# Patient Record
Sex: MALE | Race: WHITE | NOT HISPANIC OR LATINO | Employment: OTHER | URBAN - METROPOLITAN AREA
[De-identification: names, ages, dates, MRNs, and addresses within clinical notes are randomized per-mention and may not be internally consistent; named-entity substitution may affect disease eponyms.]

---

## 2021-03-01 ENCOUNTER — APPOINTMENT (OUTPATIENT)
Dept: CT IMAGING | Facility: HOSPITAL | Age: 76
End: 2021-03-01

## 2021-03-01 ENCOUNTER — APPOINTMENT (OUTPATIENT)
Dept: GENERAL RADIOLOGY | Facility: HOSPITAL | Age: 76
End: 2021-03-01

## 2021-03-01 ENCOUNTER — HOSPITAL ENCOUNTER (INPATIENT)
Facility: HOSPITAL | Age: 76
LOS: 11 days | Discharge: HOME HEALTH | End: 2021-03-12
Attending: EMERGENCY MEDICINE | Admitting: FAMILY MEDICINE

## 2021-03-01 DIAGNOSIS — I50.43 ACUTE ON CHRONIC COMBINED SYSTOLIC AND DIASTOLIC CHF (CONGESTIVE HEART FAILURE) (HCC): ICD-10-CM

## 2021-03-01 DIAGNOSIS — E11.9 TYPE 2 DIABETES MELLITUS WITHOUT COMPLICATION, UNSPECIFIED WHETHER LONG TERM INSULIN USE (HCC): ICD-10-CM

## 2021-03-01 DIAGNOSIS — R18.8 OTHER ASCITES: ICD-10-CM

## 2021-03-01 DIAGNOSIS — R06.09 DYSPNEA ON EXERTION: ICD-10-CM

## 2021-03-01 DIAGNOSIS — C25.3: ICD-10-CM

## 2021-03-01 DIAGNOSIS — R93.5 ABNORMAL CT OF THE ABDOMEN: ICD-10-CM

## 2021-03-01 DIAGNOSIS — J90 PLEURAL EFFUSION: ICD-10-CM

## 2021-03-01 DIAGNOSIS — J90 PLEURAL EFFUSION: Primary | ICD-10-CM

## 2021-03-01 DIAGNOSIS — J90 PLEURAL EFFUSION ON LEFT: ICD-10-CM

## 2021-03-01 DIAGNOSIS — R77.8 ELEVATED TROPONIN: ICD-10-CM

## 2021-03-01 PROBLEM — I81 PORTAL VEIN THROMBOSIS: Status: ACTIVE | Noted: 2021-03-01

## 2021-03-01 PROBLEM — R79.89 ELEVATED TROPONIN: Status: ACTIVE | Noted: 2021-03-01

## 2021-03-01 LAB
ALBUMIN SERPL-MCNC: 3.9 G/DL (ref 3.5–5.2)
ALBUMIN/GLOB SERPL: 1.3 G/DL
ALP SERPL-CCNC: 184 U/L (ref 39–117)
ALT SERPL W P-5'-P-CCNC: 20 U/L (ref 1–41)
ANION GAP SERPL CALCULATED.3IONS-SCNC: 12 MMOL/L (ref 5–15)
APPEARANCE FLD: ABNORMAL
APTT PPP: 26.6 SECONDS (ref 50–95)
AST SERPL-CCNC: 25 U/L (ref 1–40)
BASOPHILS # BLD AUTO: 0.06 10*3/MM3 (ref 0–0.2)
BASOPHILS # BLD AUTO: 0.08 10*3/MM3 (ref 0–0.2)
BASOPHILS NFR BLD AUTO: 0.7 % (ref 0–1.5)
BASOPHILS NFR BLD AUTO: 0.8 % (ref 0–1.5)
BILIRUB SERPL-MCNC: 0.7 MG/DL (ref 0–1.2)
BUN SERPL-MCNC: 18 MG/DL (ref 8–23)
BUN/CREAT SERPL: 25 (ref 7–25)
CALCIUM SPEC-SCNC: 9.3 MG/DL (ref 8.6–10.5)
CHLORIDE SERPL-SCNC: 101 MMOL/L (ref 98–107)
CO2 SERPL-SCNC: 28 MMOL/L (ref 22–29)
COLOR FLD: YELLOW
CREAT SERPL-MCNC: 0.72 MG/DL (ref 0.76–1.27)
D-LACTATE SERPL-SCNC: 1.9 MMOL/L (ref 0.5–2)
DEPRECATED RDW RBC AUTO: 42.6 FL (ref 37–54)
DEPRECATED RDW RBC AUTO: 42.8 FL (ref 37–54)
EOSINOPHIL # BLD AUTO: 0.07 10*3/MM3 (ref 0–0.4)
EOSINOPHIL # BLD AUTO: 0.08 10*3/MM3 (ref 0–0.4)
EOSINOPHIL NFR BLD AUTO: 0.7 % (ref 0.3–6.2)
EOSINOPHIL NFR BLD AUTO: 1 % (ref 0.3–6.2)
ERYTHROCYTE [DISTWIDTH] IN BLOOD BY AUTOMATED COUNT: 13 % (ref 12.3–15.4)
ERYTHROCYTE [DISTWIDTH] IN BLOOD BY AUTOMATED COUNT: 13.2 % (ref 12.3–15.4)
FLUAV RNA RESP QL NAA+PROBE: NOT DETECTED
FLUBV RNA RESP QL NAA+PROBE: NOT DETECTED
GFR SERPL CREATININE-BSD FRML MDRD: 106 ML/MIN/1.73
GLOBULIN UR ELPH-MCNC: 3 GM/DL
GLUCOSE BLDC GLUCOMTR-MCNC: 269 MG/DL (ref 70–130)
GLUCOSE SERPL-MCNC: 172 MG/DL (ref 65–99)
HCT VFR BLD AUTO: 42.6 % (ref 37.5–51)
HCT VFR BLD AUTO: 47.6 % (ref 37.5–51)
HGB BLD-MCNC: 13.1 G/DL (ref 13–17.7)
HGB BLD-MCNC: 15 G/DL (ref 13–17.7)
HOLD SPECIMEN: NORMAL
IMM GRANULOCYTES # BLD AUTO: 0.03 10*3/MM3 (ref 0–0.05)
IMM GRANULOCYTES # BLD AUTO: 0.05 10*3/MM3 (ref 0–0.05)
IMM GRANULOCYTES NFR BLD AUTO: 0.4 % (ref 0–0.5)
IMM GRANULOCYTES NFR BLD AUTO: 0.5 % (ref 0–0.5)
INR PPP: 1.14 (ref 0.85–1.16)
INR PPP: 1.2 (ref 0.8–1.2)
LDH FLD-CCNC: 222 U/L
LDH SERPL-CCNC: 134 U/L (ref 135–225)
LIPASE SERPL-CCNC: 14 U/L (ref 13–60)
LYMPHOCYTES # BLD AUTO: 0.97 10*3/MM3 (ref 0.7–3.1)
LYMPHOCYTES # BLD AUTO: 1.17 10*3/MM3 (ref 0.7–3.1)
LYMPHOCYTES NFR BLD AUTO: 10.9 % (ref 19.6–45.3)
LYMPHOCYTES NFR BLD AUTO: 13.4 % (ref 19.6–45.3)
LYMPHOCYTES NFR FLD MANUAL: 67 %
MACROPHAGE FLUID: 17 %
MCH RBC QN AUTO: 27.9 PG (ref 26.6–33)
MCH RBC QN AUTO: 28 PG (ref 26.6–33)
MCHC RBC AUTO-ENTMCNC: 30.8 G/DL (ref 31.5–35.7)
MCHC RBC AUTO-ENTMCNC: 31.5 G/DL (ref 31.5–35.7)
MCV RBC AUTO: 88.8 FL (ref 79–97)
MCV RBC AUTO: 90.6 FL (ref 79–97)
MONOCYTES # BLD AUTO: 0.65 10*3/MM3 (ref 0.1–0.9)
MONOCYTES # BLD AUTO: 0.96 10*3/MM3 (ref 0.1–0.9)
MONOCYTES NFR BLD AUTO: 8.9 % (ref 5–12)
MONOCYTES NFR BLD AUTO: 9 % (ref 5–12)
MONOCYTES NFR FLD: 5 %
NEUTROPHILS NFR BLD AUTO: 5.46 10*3/MM3 (ref 1.7–7)
NEUTROPHILS NFR BLD AUTO: 75.4 % (ref 42.7–76)
NEUTROPHILS NFR BLD AUTO: 78.3 % (ref 42.7–76)
NEUTROPHILS NFR BLD AUTO: 8.41 10*3/MM3 (ref 1.7–7)
NEUTROPHILS NFR FLD MANUAL: 11 %
NRBC BLD AUTO-RTO: 0 /100 WBC (ref 0–0.2)
NRBC BLD AUTO-RTO: 0 /100 WBC (ref 0–0.2)
NT-PROBNP SERPL-MCNC: 809.8 PG/ML (ref 0–1800)
PH FLD: 7.36 [PH]
PLATELET # BLD AUTO: 173 10*3/MM3 (ref 140–450)
PLATELET # BLD AUTO: 240 10*3/MM3 (ref 140–450)
PMV BLD AUTO: 10.2 FL (ref 6–12)
PMV BLD AUTO: 10.3 FL (ref 6–12)
POTASSIUM SERPL-SCNC: 4.2 MMOL/L (ref 3.5–5.2)
PROCALCITONIN SERPL-MCNC: 0.04 NG/ML (ref 0–0.25)
PROT FLD-MCNC: 3.6 G/DL
PROT SERPL-MCNC: 6.9 G/DL (ref 6–8.5)
PROTHROMBIN TIME: 13.9 SECONDS (ref 12.8–15.2)
PROTHROMBIN TIME: 14.3 SECONDS (ref 11.5–14)
RBC # BLD AUTO: 4.7 10*6/MM3 (ref 4.14–5.8)
RBC # BLD AUTO: 5.36 10*6/MM3 (ref 4.14–5.8)
RBC # FLD AUTO: ABNORMAL /MM3
SARS-COV-2 RNA RESP QL NAA+PROBE: NOT DETECTED
SODIUM SERPL-SCNC: 141 MMOL/L (ref 136–145)
TROPONIN T SERPL-MCNC: 0.03 NG/ML (ref 0–0.03)
TROPONIN T SERPL-MCNC: 0.03 NG/ML (ref 0–0.03)
TROPONIN T SERPL-MCNC: 0.06 NG/ML (ref 0–0.03)
UFH PPP CHRO-ACNC: 0.1 IU/ML (ref 0.3–0.7)
WBC # BLD AUTO: 10.75 10*3/MM3 (ref 3.4–10.8)
WBC # BLD AUTO: 7.24 10*3/MM3 (ref 3.4–10.8)
WBC # FLD AUTO: 1057 /MM3
WHOLE BLOOD HOLD SPECIMEN: NORMAL
WHOLE BLOOD HOLD SPECIMEN: NORMAL

## 2021-03-01 PROCEDURE — 87206 SMEAR FLUORESCENT/ACID STAI: CPT | Performed by: INTERNAL MEDICINE

## 2021-03-01 PROCEDURE — 85610 PROTHROMBIN TIME: CPT

## 2021-03-01 PROCEDURE — 74177 CT ABD & PELVIS W/CONTRAST: CPT

## 2021-03-01 PROCEDURE — 84157 ASSAY OF PROTEIN OTHER: CPT | Performed by: INTERNAL MEDICINE

## 2021-03-01 PROCEDURE — 83615 LACTATE (LD) (LDH) ENZYME: CPT | Performed by: INTERNAL MEDICINE

## 2021-03-01 PROCEDURE — 87205 SMEAR GRAM STAIN: CPT | Performed by: INTERNAL MEDICINE

## 2021-03-01 PROCEDURE — 87116 MYCOBACTERIA CULTURE: CPT | Performed by: INTERNAL MEDICINE

## 2021-03-01 PROCEDURE — 87102 FUNGUS ISOLATION CULTURE: CPT | Performed by: INTERNAL MEDICINE

## 2021-03-01 PROCEDURE — 89051 BODY FLUID CELL COUNT: CPT | Performed by: INTERNAL MEDICINE

## 2021-03-01 PROCEDURE — 85025 COMPLETE CBC W/AUTO DIFF WBC: CPT

## 2021-03-01 PROCEDURE — 83986 ASSAY PH BODY FLUID NOS: CPT | Performed by: INTERNAL MEDICINE

## 2021-03-01 PROCEDURE — 93005 ELECTROCARDIOGRAM TRACING: CPT | Performed by: EMERGENCY MEDICINE

## 2021-03-01 PROCEDURE — 99285 EMERGENCY DEPT VISIT HI MDM: CPT

## 2021-03-01 PROCEDURE — 82962 GLUCOSE BLOOD TEST: CPT

## 2021-03-01 PROCEDURE — 83690 ASSAY OF LIPASE: CPT | Performed by: EMERGENCY MEDICINE

## 2021-03-01 PROCEDURE — 83605 ASSAY OF LACTIC ACID: CPT | Performed by: NURSE PRACTITIONER

## 2021-03-01 PROCEDURE — C1729 CATH, DRAINAGE: HCPCS

## 2021-03-01 PROCEDURE — 71045 X-RAY EXAM CHEST 1 VIEW: CPT

## 2021-03-01 PROCEDURE — 80053 COMPREHEN METABOLIC PANEL: CPT | Performed by: EMERGENCY MEDICINE

## 2021-03-01 PROCEDURE — 99223 1ST HOSP IP/OBS HIGH 75: CPT | Performed by: FAMILY MEDICINE

## 2021-03-01 PROCEDURE — 84484 ASSAY OF TROPONIN QUANT: CPT | Performed by: FAMILY MEDICINE

## 2021-03-01 PROCEDURE — 83880 ASSAY OF NATRIURETIC PEPTIDE: CPT | Performed by: EMERGENCY MEDICINE

## 2021-03-01 PROCEDURE — 87040 BLOOD CULTURE FOR BACTERIA: CPT | Performed by: NURSE PRACTITIONER

## 2021-03-01 PROCEDURE — 84484 ASSAY OF TROPONIN QUANT: CPT | Performed by: EMERGENCY MEDICINE

## 2021-03-01 PROCEDURE — 85730 THROMBOPLASTIN TIME PARTIAL: CPT

## 2021-03-01 PROCEDURE — 84145 PROCALCITONIN (PCT): CPT | Performed by: NURSE PRACTITIONER

## 2021-03-01 PROCEDURE — 0 IOPAMIDOL PER 1 ML: Performed by: EMERGENCY MEDICINE

## 2021-03-01 PROCEDURE — 87075 CULTR BACTERIA EXCEPT BLOOD: CPT | Performed by: INTERNAL MEDICINE

## 2021-03-01 PROCEDURE — 75989 ABSCESS DRAINAGE UNDER X-RAY: CPT

## 2021-03-01 PROCEDURE — 87070 CULTURE OTHR SPECIMN AEROBIC: CPT | Performed by: INTERNAL MEDICINE

## 2021-03-01 PROCEDURE — 25010000002 HEPARIN (PORCINE) 25000-0.45 UT/250ML-% SOLUTION

## 2021-03-01 PROCEDURE — 85520 HEPARIN ASSAY: CPT

## 2021-03-01 PROCEDURE — 25010000002 HEPARIN (PORCINE) PER 1000 UNITS

## 2021-03-01 PROCEDURE — 85025 COMPLETE CBC W/AUTO DIFF WBC: CPT | Performed by: EMERGENCY MEDICINE

## 2021-03-01 PROCEDURE — 0W9B3ZZ DRAINAGE OF LEFT PLEURAL CAVITY, PERCUTANEOUS APPROACH: ICD-10-PCS | Performed by: RADIOLOGY

## 2021-03-01 PROCEDURE — 71275 CT ANGIOGRAPHY CHEST: CPT

## 2021-03-01 PROCEDURE — 87636 SARSCOV2 & INF A&B AMP PRB: CPT | Performed by: NURSE PRACTITIONER

## 2021-03-01 PROCEDURE — 93005 ELECTROCARDIOGRAM TRACING: CPT

## 2021-03-01 RX ORDER — ENALAPRIL MALEATE 10 MG/1
10 TABLET ORAL DAILY
COMMUNITY
End: 2021-03-12 | Stop reason: HOSPADM

## 2021-03-01 RX ORDER — HEPARIN SODIUM 10000 [USP'U]/100ML
18 INJECTION, SOLUTION INTRAVENOUS
Status: DISCONTINUED | OUTPATIENT
Start: 2021-03-01 | End: 2021-03-03

## 2021-03-01 RX ORDER — ACETAMINOPHEN 325 MG/1
650 TABLET ORAL EVERY 4 HOURS PRN
Status: DISCONTINUED | OUTPATIENT
Start: 2021-03-01 | End: 2021-03-12 | Stop reason: HOSPADM

## 2021-03-01 RX ORDER — AMOXICILLIN 250 MG
2 CAPSULE ORAL 2 TIMES DAILY PRN
Status: DISCONTINUED | OUTPATIENT
Start: 2021-03-01 | End: 2021-03-12 | Stop reason: HOSPADM

## 2021-03-01 RX ORDER — SODIUM CHLORIDE 0.9 % (FLUSH) 0.9 %
10 SYRINGE (ML) INJECTION AS NEEDED
Status: DISCONTINUED | OUTPATIENT
Start: 2021-03-01 | End: 2021-03-12 | Stop reason: HOSPADM

## 2021-03-01 RX ORDER — ENALAPRIL MALEATE 10 MG/1
10 TABLET ORAL DAILY
Status: DISCONTINUED | OUTPATIENT
Start: 2021-03-02 | End: 2021-03-05

## 2021-03-01 RX ORDER — POTASSIUM CHLORIDE 1.5 G/1.77G
40 POWDER, FOR SOLUTION ORAL AS NEEDED
Status: DISCONTINUED | OUTPATIENT
Start: 2021-03-01 | End: 2021-03-12 | Stop reason: HOSPADM

## 2021-03-01 RX ORDER — SODIUM CHLORIDE 0.9 % (FLUSH) 0.9 %
10 SYRINGE (ML) INJECTION EVERY 12 HOURS SCHEDULED
Status: DISCONTINUED | OUTPATIENT
Start: 2021-03-01 | End: 2021-03-12 | Stop reason: HOSPADM

## 2021-03-01 RX ORDER — BISACODYL 10 MG
10 SUPPOSITORY, RECTAL RECTAL DAILY PRN
Status: DISCONTINUED | OUTPATIENT
Start: 2021-03-01 | End: 2021-03-12 | Stop reason: HOSPADM

## 2021-03-01 RX ORDER — HEPARIN SODIUM 1000 [USP'U]/ML
6000 INJECTION, SOLUTION INTRAVENOUS; SUBCUTANEOUS ONCE
Status: COMPLETED | OUTPATIENT
Start: 2021-03-01 | End: 2021-03-01

## 2021-03-01 RX ORDER — ACETAMINOPHEN 650 MG/1
650 SUPPOSITORY RECTAL EVERY 4 HOURS PRN
Status: DISCONTINUED | OUTPATIENT
Start: 2021-03-01 | End: 2021-03-12 | Stop reason: HOSPADM

## 2021-03-01 RX ORDER — ASPIRIN 81 MG/1
324 TABLET, CHEWABLE ORAL ONCE
Status: DISCONTINUED | OUTPATIENT
Start: 2021-03-01 | End: 2021-03-03

## 2021-03-01 RX ORDER — POTASSIUM CHLORIDE 7.45 MG/ML
10 INJECTION INTRAVENOUS
Status: DISCONTINUED | OUTPATIENT
Start: 2021-03-01 | End: 2021-03-12 | Stop reason: HOSPADM

## 2021-03-01 RX ORDER — BISACODYL 5 MG/1
5 TABLET, DELAYED RELEASE ORAL DAILY PRN
Status: DISCONTINUED | OUTPATIENT
Start: 2021-03-01 | End: 2021-03-12 | Stop reason: HOSPADM

## 2021-03-01 RX ORDER — DEXTROSE MONOHYDRATE 25 G/50ML
25 INJECTION, SOLUTION INTRAVENOUS
Status: DISCONTINUED | OUTPATIENT
Start: 2021-03-01 | End: 2021-03-12 | Stop reason: HOSPADM

## 2021-03-01 RX ORDER — NICOTINE POLACRILEX 4 MG
15 LOZENGE BUCCAL
Status: DISCONTINUED | OUTPATIENT
Start: 2021-03-01 | End: 2021-03-12 | Stop reason: HOSPADM

## 2021-03-01 RX ORDER — MAGNESIUM SULFATE HEPTAHYDRATE 40 MG/ML
2 INJECTION, SOLUTION INTRAVENOUS AS NEEDED
Status: DISCONTINUED | OUTPATIENT
Start: 2021-03-01 | End: 2021-03-12 | Stop reason: HOSPADM

## 2021-03-01 RX ORDER — MAGNESIUM SULFATE HEPTAHYDRATE 40 MG/ML
4 INJECTION, SOLUTION INTRAVENOUS AS NEEDED
Status: DISCONTINUED | OUTPATIENT
Start: 2021-03-01 | End: 2021-03-12 | Stop reason: HOSPADM

## 2021-03-01 RX ORDER — POTASSIUM CHLORIDE 750 MG/1
40 CAPSULE, EXTENDED RELEASE ORAL AS NEEDED
Status: DISCONTINUED | OUTPATIENT
Start: 2021-03-01 | End: 2021-03-12 | Stop reason: HOSPADM

## 2021-03-01 RX ORDER — ACETAMINOPHEN 160 MG/5ML
650 SOLUTION ORAL EVERY 4 HOURS PRN
Status: DISCONTINUED | OUTPATIENT
Start: 2021-03-01 | End: 2021-03-12 | Stop reason: HOSPADM

## 2021-03-01 RX ORDER — SODIUM CHLORIDE 0.9 % (FLUSH) 0.9 %
10 SYRINGE (ML) INJECTION AS NEEDED
Status: DISCONTINUED | OUTPATIENT
Start: 2021-03-01 | End: 2021-03-06

## 2021-03-01 RX ORDER — CHOLECALCIFEROL (VITAMIN D3) 125 MCG
5 CAPSULE ORAL NIGHTLY PRN
Status: DISCONTINUED | OUTPATIENT
Start: 2021-03-01 | End: 2021-03-12 | Stop reason: HOSPADM

## 2021-03-01 RX ORDER — ONDANSETRON 4 MG/1
4 TABLET, FILM COATED ORAL EVERY 6 HOURS PRN
Status: DISCONTINUED | OUTPATIENT
Start: 2021-03-01 | End: 2021-03-12 | Stop reason: HOSPADM

## 2021-03-01 RX ORDER — CALCIUM CARBONATE 200(500)MG
2 TABLET,CHEWABLE ORAL 2 TIMES DAILY PRN
Status: DISCONTINUED | OUTPATIENT
Start: 2021-03-01 | End: 2021-03-12 | Stop reason: HOSPADM

## 2021-03-01 RX ORDER — LIDOCAINE HYDROCHLORIDE 10 MG/ML
20 INJECTION, SOLUTION EPIDURAL; INFILTRATION; INTRACAUDAL; PERINEURAL ONCE
Status: DISCONTINUED | OUTPATIENT
Start: 2021-03-01 | End: 2021-03-05

## 2021-03-01 RX ORDER — ONDANSETRON 2 MG/ML
4 INJECTION INTRAMUSCULAR; INTRAVENOUS EVERY 6 HOURS PRN
Status: DISCONTINUED | OUTPATIENT
Start: 2021-03-01 | End: 2021-03-12 | Stop reason: HOSPADM

## 2021-03-01 RX ADMIN — IOPAMIDOL 100 ML: 755 INJECTION, SOLUTION INTRAVENOUS at 12:28

## 2021-03-01 RX ADMIN — SODIUM CHLORIDE, PRESERVATIVE FREE 10 ML: 5 INJECTION INTRAVENOUS at 20:02

## 2021-03-01 RX ADMIN — HEPARIN SODIUM 18 UNITS/KG/HR: 10000 INJECTION, SOLUTION INTRAVENOUS at 17:54

## 2021-03-01 RX ADMIN — SODIUM CHLORIDE 500 ML: 9 INJECTION, SOLUTION INTRAVENOUS at 15:12

## 2021-03-01 RX ADMIN — HEPARIN SODIUM 6000 UNITS: 1000 INJECTION, SOLUTION INTRAVENOUS; SUBCUTANEOUS at 17:54

## 2021-03-01 NOTE — ED PROVIDER NOTES
"Subjective   Patient presents to the emergency department with complaint of shortness of breath with exertion for approximately 60 days with significant worsening over the last 2 weeks.    Mr. Alvarez considers himself a very healthy person, having taken Vasotec only for approximately 20 years now. He established a more consistent primary care relationship last summer.  He was placed on Metformin for some prediabetes appreciated on his lab work and that medication has been increased this year with persisting hyperglycemia.  He is currently on the sixth day of ampicillin prescribed by his dentist for \"an infected root canal\".  He is anticipating an appointment with an oral surgeon soon.    He denies any history of coronary artery disease.  He saw a cardiologist on 1 occasion at [facility] approximately 15 years ago at which time he had a heart cath which was negative.    He received his second Covid vaccination on Saturday.  Review of systems pertinent Covid: He is negative for fever, chills, loss of taste or smell.  He is positive for chest pain, cough and shortness of breath especially with exertion which is his chief complaint today.  He is negative for nausea or vomiting but mentions 2 events of diarrhea recently.      History provided by:  Patient   used: No        Review of Systems   Constitutional: Positive for unexpected weight change (\"I have lost about 50 pounds in the last five years\" ).   HENT: Negative.    Eyes: Negative.    Respiratory: Positive for cough and shortness of breath (especially on exertion ).    Cardiovascular: Positive for chest pain (with exertional sob ). Negative for palpitations and leg swelling.   Gastrointestinal: Positive for abdominal distention (\"come to think of it, I feel a little bloated\" ) and diarrhea. Negative for nausea. Constipation: x2 recently.   Endocrine:        Recently diagnosed as type II diabetic on Metformin only   Genitourinary: " "Negative.    Musculoskeletal: Positive for back pain (chronically).        \"I have a bad back\" and has occasional lower back pain.   Skin: Negative for color change and rash.   Allergic/Immunologic: Negative.    Neurological: Positive for weakness (generalized ). Negative for dizziness.   Hematological: Does not bruise/bleed easily.   Psychiatric/Behavioral: Negative.    All other systems reviewed and are negative.      Past Medical History:   Diagnosis Date   • Diabetes mellitus (CMS/HCC)    • Hypertension        No Known Allergies    Past Surgical History:   Procedure Laterality Date   • CLAVICLE SURGERY         History reviewed. No pertinent family history.    Social History     Socioeconomic History   • Marital status:      Spouse name: Not on file   • Number of children: Not on file   • Years of education: Not on file   • Highest education level: Not on file   Tobacco Use   • Smoking status: Never Smoker   • Smokeless tobacco: Never Used   Substance and Sexual Activity   • Alcohol use: Yes     Comment: rarely            Objective   Physical Exam  Vitals signs and nursing note reviewed.   Constitutional:       General: He is not in acute distress.     Appearance: Normal appearance. He is not ill-appearing or toxic-appearing.      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  His vital signs are normal with exception of room air saturations at rest in the low 90s.  He appears very lean without muscle wasting   HENT:      Head: Normocephalic.      Right Ear: External ear normal.      Left Ear: External ear normal.      Nose: Nose normal.      Mouth/Throat:      Mouth: Mucous membranes are moist.      Pharynx: No oropharyngeal exudate.   Eyes:      Conjunctiva/sclera: Conjunctivae normal.   Neck:      Musculoskeletal: Normal range of motion and neck supple. No muscular tenderness.   Cardiovascular:      Rate and Rhythm: Normal rate and regular rhythm.      Heart sounds: Normal heart sounds. No murmur. "   Pulmonary:      Effort: Pulmonary effort is normal. No respiratory distress.      Breath sounds: Examination of the left-upper field reveals decreased breath sounds. Examination of the left-middle field reveals decreased breath sounds. Examination of the left-lower field reveals decreased breath sounds. Decreased breath sounds present.   Abdominal:      General: Bowel sounds are normal. There is no distension.      Palpations: Abdomen is soft.      Tenderness: There is no abdominal tenderness.   Musculoskeletal: Normal range of motion.         General: No swelling, tenderness or deformity.      Right lower leg: He exhibits no tenderness. No edema.      Left lower leg: He exhibits no tenderness. No edema.   Skin:     General: Skin is warm and dry.      Capillary Refill: Capillary refill takes less than 2 seconds.      Coloration: Skin is not pale.      Findings: No lesion.   Neurological:      General: No focal deficit present.      Mental Status: He is alert and oriented to person, place, and time.      Comments: No Neurosensory deficit or focal weakness     Psychiatric:         Behavior: Behavior normal.         Thought Content: Thought content normal.         Procedures           ED Course  ED Course as of Mar 01 1646   Mon Mar 01, 2021   1139 Troponin T(!!): 0.031 [MS]      ED Course User Index  [MS] Mari Goss APRN      Recent Results (from the past 24 hour(s))   Troponin    Collection Time: 03/01/21 11:00 AM    Specimen: Blood   Result Value Ref Range    Troponin T 0.031 (C) 0.000 - 0.030 ng/mL   Comprehensive Metabolic Panel    Collection Time: 03/01/21 11:00 AM    Specimen: Blood   Result Value Ref Range    Glucose 172 (H) 65 - 99 mg/dL    BUN 18 8 - 23 mg/dL    Creatinine 0.72 (L) 0.76 - 1.27 mg/dL    Sodium 141 136 - 145 mmol/L    Potassium 4.2 3.5 - 5.2 mmol/L    Chloride 101 98 - 107 mmol/L    CO2 28.0 22.0 - 29.0 mmol/L    Calcium 9.3 8.6 - 10.5 mg/dL    Total Protein 6.9 6.0 - 8.5 g/dL     Albumin 3.90 3.50 - 5.20 g/dL    ALT (SGPT) 20 1 - 41 U/L    AST (SGOT) 25 1 - 40 U/L    Alkaline Phosphatase 184 (H) 39 - 117 U/L    Total Bilirubin 0.7 0.0 - 1.2 mg/dL    eGFR Non African Amer 106 >60 mL/min/1.73    Globulin 3.0 gm/dL    A/G Ratio 1.3 g/dL    BUN/Creatinine Ratio 25.0 7.0 - 25.0    Anion Gap 12.0 5.0 - 15.0 mmol/L   Lipase    Collection Time: 03/01/21 11:00 AM    Specimen: Blood   Result Value Ref Range    Lipase 14 13 - 60 U/L   BNP    Collection Time: 03/01/21 11:00 AM    Specimen: Blood   Result Value Ref Range    proBNP 809.8 0.0-1,800.0 pg/mL   Light Blue Top    Collection Time: 03/01/21 11:00 AM   Result Value Ref Range    Extra Tube hold for add-on    Green Top (Gel)    Collection Time: 03/01/21 11:00 AM   Result Value Ref Range    Extra Tube Hold for add-ons.    Lavender Top    Collection Time: 03/01/21 11:00 AM   Result Value Ref Range    Extra Tube hold for add-on    Gold Top - SST    Collection Time: 03/01/21 11:00 AM   Result Value Ref Range    Extra Tube Hold for add-ons.    Gray Top - Ice    Collection Time: 03/01/21 11:00 AM   Result Value Ref Range    Extra Tube Hold for add-ons.    CBC Auto Differential    Collection Time: 03/01/21 11:00 AM    Specimen: Blood   Result Value Ref Range    WBC 10.75 3.40 - 10.80 10*3/mm3    RBC 5.36 4.14 - 5.80 10*6/mm3    Hemoglobin 15.0 13.0 - 17.7 g/dL    Hematocrit 47.6 37.5 - 51.0 %    MCV 88.8 79.0 - 97.0 fL    MCH 28.0 26.6 - 33.0 pg    MCHC 31.5 31.5 - 35.7 g/dL    RDW 13.2 12.3 - 15.4 %    RDW-SD 42.6 37.0 - 54.0 fl    MPV 10.2 6.0 - 12.0 fL    Platelets 240 140 - 450 10*3/mm3    Neutrophil % 78.3 (H) 42.7 - 76.0 %    Lymphocyte % 10.9 (L) 19.6 - 45.3 %    Monocyte % 8.9 5.0 - 12.0 %    Eosinophil % 0.7 0.3 - 6.2 %    Basophil % 0.7 0.0 - 1.5 %    Immature Grans % 0.5 0.0 - 0.5 %    Neutrophils, Absolute 8.41 (H) 1.70 - 7.00 10*3/mm3    Lymphocytes, Absolute 1.17 0.70 - 3.10 10*3/mm3    Monocytes, Absolute 0.96 (H) 0.10 - 0.90 10*3/mm3     Eosinophils, Absolute 0.08 0.00 - 0.40 10*3/mm3    Basophils, Absolute 0.08 0.00 - 0.20 10*3/mm3    Immature Grans, Absolute 0.05 0.00 - 0.05 10*3/mm3    nRBC 0.0 0.0 - 0.2 /100 WBC   Lactic Acid, Plasma    Collection Time: 03/01/21 11:00 AM    Specimen: Blood   Result Value Ref Range    Lactate 1.9 0.5 - 2.0 mmol/L   Procalcitonin    Collection Time: 03/01/21 11:00 AM    Specimen: Blood   Result Value Ref Range    Procalcitonin 0.04 0.00 - 0.25 ng/mL   Troponin    Collection Time: 03/01/21 12:34 PM    Specimen: Blood   Result Value Ref Range    Troponin T 0.031 (C) 0.000 - 0.030 ng/mL   Lactate Dehydrogenase    Collection Time: 03/01/21 12:34 PM    Specimen: Blood   Result Value Ref Range     (L) 135 - 225 U/L   COVID-19 and FLU A/B PCR - Swab, Nasopharynx    Collection Time: 03/01/21 12:36 PM    Specimen: Nasopharynx; Swab   Result Value Ref Range    COVID19 Not Detected Not Detected - Ref. Range    Influenza A PCR Not Detected Not Detected    Influenza B PCR Not Detected Not Detected   POC Protime / INR    Collection Time: 03/01/21  3:11 PM    Specimen: Blood   Result Value Ref Range    Protime 13.9 12.8 - 15.2 seconds    INR 1.2 0.8 - 1.2     Note: In addition to lab results from this visit, the labs listed above may include labs taken at another facility or during a different encounter within the last 24 hours. Please correlate lab times with ED admission and discharge times for further clarification of the services performed during this visit.    CT Guided Thoracentesis   Final Result       CT-guided thoracentesis with drainage of 2000 mL of dark yellow/cloudy   fluid. Portion of fluid sent for requested laboratory analysis.       Plan:        Resume care by clinical team.   _______________________________________________________________       PROCEDURE SUMMARY:   - Limited thoracic CT   - CT-guided thoracentesis   - Additional procedure(s): None       PROCEDURE DETAILS:       Pre-procedure   Consent:  Informed consent for the procedure including risks, benefits   and alternatives was obtained and time-out was performed prior to the   procedure.   Preparation: The site was prepared and draped using maximal sterile   barrier technique including cutaneous antisepsis.       Anesthesia/sedation   Level of anesthesia/sedation: No sedation       Limited thoracic CT   Limited thoracic CT was performed. A safe window for thoracentesis was   identified.    Left hemithorax findings: Large pleural fluid collection       Thoracentesis   Local anesthesia was administered. The pleural space was accessed using   trocar technique  and fluid return confirmed position. The fluid was   drained. The catheter was removed, and a sterile bandage was applied.   Catheter placed: 6.5 Estonian nonlocking pigtail   Post-drainage hemithorax findings: Significant residual pleural fluid   collection. No immediate complication       Radiation Dose   CT dose length product (mGy-cm): 423       Additional Details   Additional description of procedure: None   Equipment details: None   Specimens removed: Pleural fluid   Estimated blood loss (mL): Less than 10   Standardized report: Thoracentesis       Attestation   I was present and scrubbed for the entire procedure. Imaging reviewed.   Agree with final report as written.           This report was finalized on 3/1/2021 4:21 PM by Raheem Graff.          CT Angiogram Chest   Preliminary Result   1. No evidence of pulmonary embolus.    2. Very large left pleural effusion, and atelectasis of most of the left   lung.   3. Large right pleural effusion with atelectasis of a moderate amount of   right lower lobe. Minor nonspecific interstitial lung changes seen   elsewhere.       ABDOMEN AND PELVIS CT SCAN WITH IV CONTRAST: There is mild-to- moderate   ascites, adjacent the liver and spleen and in the upper paracolic   gutters. There is a very small amount of intrapelvic free fluid. There   is diffuse fatty  liver change, and somewhat asymmetric enhancement   pattern of the right and left liver lobes initially, but uniform and   normal in appearance of the liver on the delayed series. As can be   appreciated on images 25-30, there is dense opacification of the main   portal vein, right portal vein, splenic vein with contrast, but no   visible portal vein contrast in the left portal vein, suggesting   thrombosis and occlusion. There is perhaps mild narrowing of the distal   main portal vein at the confluence of the right and left portal veins,   axial image 29-series 8. Superior mesenteric vein is incompletely   contrast opacified but this may be due to venous mixing, rather than   thrombus.       The spleen is in the upper range of normal at 12.5 cm. There is upper   abdomen fat stranding although this may represent edema. The appearance   somewhat resembles omental metastatic disease, particularly in the right   mid abdomen, images 46-49 of series 8. The bowel loops are normal in   caliber. There is a large simple appearing right renal cyst. The kidneys   otherwise appear unremarkable. The pancreas, gallbladder, and adrenal   glands appear unremarkable.       Regarding the lower abdomen and pelvis, no mass or adenopathy is seen.   There is a relatively small amount of pelvic ascites. Delayed venous   phase images show contrast opacification of normal caliber renal   collecting systems, ureters and bladder. The bony structures appear   grossly intact.       IMPRESSION:    1. Mild-to-moderate ascites.   2. Apparently thrombosed left portal vein, with no visible portal vein   contrast in the left lobe, and delayed parenchymal enhancement as a   result.   3. Rather dense reticular disease of the right upper quadrant omental   fat, possibly related to the patient's ascites but worrisome for omental   metastatic disease. Similar milder changes of the omental fat elsewhere.       NOTE: Preliminary findings of this exam were  discussed with the ordering   provider, MARK Travis, at 2:20 PM 03/01/2021.       D:  03/01/2021   E:  03/01/2021              CT Abdomen Pelvis With Contrast   Preliminary Result   1. No evidence of pulmonary embolus.    2. Very large left pleural effusion, and atelectasis of most of the left   lung.   3. Large right pleural effusion with atelectasis of a moderate amount of   right lower lobe. Minor nonspecific interstitial lung changes seen   elsewhere.       ABDOMEN AND PELVIS CT SCAN WITH IV CONTRAST: There is mild-to- moderate   ascites, adjacent the liver and spleen and in the upper paracolic   gutters. There is a very small amount of intrapelvic free fluid. There   is diffuse fatty liver change, and somewhat asymmetric enhancement   pattern of the right and left liver lobes initially, but uniform and   normal in appearance of the liver on the delayed series. As can be   appreciated on images 25-30, there is dense opacification of the main   portal vein, right portal vein, splenic vein with contrast, but no   visible portal vein contrast in the left portal vein, suggesting   thrombosis and occlusion. There is perhaps mild narrowing of the distal   main portal vein at the confluence of the right and left portal veins,   axial image 29-series 8. Superior mesenteric vein is incompletely   contrast opacified but this may be due to venous mixing, rather than   thrombus.       The spleen is in the upper range of normal at 12.5 cm. There is upper   abdomen fat stranding although this may represent edema. The appearance   somewhat resembles omental metastatic disease, particularly in the right   mid abdomen, images 46-49 of series 8. The bowel loops are normal in   caliber. There is a large simple appearing right renal cyst. The kidneys   otherwise appear unremarkable. The pancreas, gallbladder, and adrenal   glands appear unremarkable.       Regarding the lower abdomen and pelvis, no mass or adenopathy is seen.    There is a relatively small amount of pelvic ascites. Delayed venous   phase images show contrast opacification of normal caliber renal   collecting systems, ureters and bladder. The bony structures appear   grossly intact.       IMPRESSION:    1. Mild-to-moderate ascites.   2. Apparently thrombosed left portal vein, with no visible portal vein   contrast in the left lobe, and delayed parenchymal enhancement as a   result.   3. Rather dense reticular disease of the right upper quadrant omental   fat, possibly related to the patient's ascites but worrisome for omental   metastatic disease. Similar milder changes of the omental fat elsewhere.       NOTE: Preliminary findings of this exam were discussed with the ordering   provider, MARK Travis, at 2:20 PM 03/01/2021.       D:  03/01/2021   E:  03/01/2021              XR Chest 1 View   Preliminary Result   1. Opacification of most of the left hemithorax, apparently by pleural   effusion.   2. Moderately extensive right basilar atelectasis and effusion.   3. Cardiomegaly without evidence of overt congestive failure.        D:  03/01/2021   E:  03/01/2021            Vitals:    03/01/21 1601 03/01/21 1609 03/01/21 1612 03/01/21 1641   BP: 114/78 117/69 117/69    BP Location:       Patient Position:       Pulse: 80 76 77    Resp: 20 20 18    Temp:       TempSrc:       SpO2: 93% 94% 95% 95%   Weight:    74.8 kg (165 lb)   Height:         Medications   sodium chloride 0.9 % flush 10 mL (has no administration in time range)   aspirin chewable tablet 324 mg (324 mg Oral Not Given 3/1/21 1106)   !Hold anticoagulation for 24 hours before thoracentesis! (has no administration in time range)   lidocaine PF 1% (XYLOCAINE) injection 20 mL (has no administration in time range)   iopamidol (ISOVUE-370) 76 % injection 100 mL (100 mL Intravenous Given 3/1/21 1228)   sodium chloride 0.9 % bolus 500 mL (500 mL Intravenous New Bag 3/1/21 1512)     ECG/EMG Results (last 24 hours)      Procedure Component Value Units Date/Time    ECG 12 Lead [[id number]] Collected: 03/01/21 1033     Updated: 03/01/21 1139    ECG 12 Lead [[id number]] Collected: 03/01/21 1239     Updated: 03/01/21 1244        ECG 12 Lead         ECG 12 Lead                                                  MDM    Final diagnoses:   Pleural effusion   Abnormal CT of the abdomen   Dyspnea on exertion   Elevated troponin       ADMITTED       Mari Goss, APRN  03/01/21 6802

## 2021-03-01 NOTE — PROGRESS NOTES
Discharge Planning Assessment  [facility]     Patient Name: Danny Alvarez Jr.  MRN: [id number]  Today's Date: 3/1/2021    Admit Date: 3/1/2021    Discharge Needs Assessment     Row Name 03/01/21 1543       Living Environment    Lives With  spouse    Name(s) of Who Lives With Patient  Sania [phone]    Current Living Arrangements  home/apartment/condo    Potentially Unsafe Housing Conditions  unable to assess    Primary Care Provided by  self    Provides Primary Care For  no one    Family Caregiver if Needed  spouse    Family Caregiver Names  Sania    Quality of Family Relationships  helpful;involved;supportive    Able to Return to Prior Arrangements  yes       Resource/Environmental Concerns    Resource/Environmental Concerns  none    Transportation Concerns  car, none       Transition Planning    Patient/Family Anticipates Transition to  home    Patient/Family Anticipated Services at Transition  none    Transportation Anticipated  family or friend will provide       Discharge Needs Assessment    Readmission Within the Last 30 Days  no previous admission in last 30 days    Equipment Currently Used at Home  cpap    Concerns to be Addressed  denies needs/concerns at this time    Anticipated Changes Related to Illness  none    Provided Post Acute Provider List?  N/A    Provided Post Acute Provider Quality & Resource List?  N/A        Discharge Plan     Row Name 03/01/21 1546       Plan    Plan  initial    Plan Comments  Pt lives with his wife in [facility]. 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 No DME, except Cpap or outpt services. PCP is Jef Beckham    Final Discharge Disposition Code  30 - still a patient        Continued Care and Services - Admitted Since 3/1/2021    Coordination has not been started for this encounter.         Demographic Summary    No documentation.       Functional Status     Row Name 03/01/21 1542       Functional Status    Usual Activity Tolerance   good       Functional Status, IADL    Medications  independent    Meal Preparation  independent    Housekeeping  independent    Laundry  independent    Shopping  independent       Mental Status    General Appearance WDL  WDL       Mental Status Summary    Recent Changes in Mental Status/Cognitive Functioning  no changes       Employment/    Employment Status  retired        Psychosocial    No documentation.       Abuse/Neglect    No documentation.       Legal    No documentation.       Substance Abuse    No documentation.       Patient Forms    No documentation.           Joyce Muro RN

## 2021-03-01 NOTE — NURSING NOTE
Patient placed on cardiac monitors, vital signs stable. Images taken and reviewed by Dr. Graff. A total volume of 2000 mL tea colored fluid was removed from the left pleural space with samples sent to lab for testing. Patient tolerated well, vitals remained stable throughout procedure (pt on 5L oxygen via nasal cannula). Patient returned to ED and report to RN.

## 2021-03-01 NOTE — H&P
"    [facility]  HISTORY AND PHYSICAL    Patient Name: Danny Alvarez Jr.  : 1945  MRN: [id number]  Primary Care Physician: Jef Beckham MD  Date of admission: 3/1/2021      Subjective   Subjective     Chief Complaint:  SOA    HPI:  Danny Alvarez Jr. is a 75 y.o. male with PMH significant for HTN and hyperglycemia.  He presented to the ER with a 2-3 month history of dyspnea, but much worsened over the last 2 weeks.  He has noted especially SOA when he attempts to climb his stairs at home.  His daughter works here and took him a pulse oximeter and yesterday he measured his O2 sat on RA at 60% at the top of the stairs.  Presently he is comfortable and satting in the low 90's.  He has not had fever, but has had a cough and has tried Mucinex for that.  He has had some mild but persistent nausea and sour brash for the last several months.  He has had some mild dependent edema in his BLE for 2 months as well as some tingling in his fingers.  He has lost 50# over some undefined period of time, which he attributes to quitting a stressful job.  He had one episode of explosive diarrhea about 10 days ago and has not had any since.  Of note, he is on antibiotics for what he is calling and \"infected root canal\" and is on his 6th day of therapy.  He also had his second COVID vaccine on 21.          COVID Details: [] No Symptoms  Symptoms: [] Fever [x]  Cough [x] Shortness of breath [] Change in taste or smell  Risks:  [] Direct Exposure [] High risk facility   Date of Onset:  ____  Date of first positive COVID test:     Review of Systems   Per HPI - all otherwise negative.  All other systems reviewed and are negative.     Personal History     Past Medical History:   Diagnosis Date   • Diabetes mellitus (CMS/HCC)    • Hypertension        Past Surgical History:   Procedure Laterality Date   • CLAVICLE SURGERY         Family History: family history is not on file. Otherwise " "pertinent FHx was reviewed and unremarkable.     Social History:  reports that he has never smoked. He has never used smokeless tobacco. He reports current alcohol use. Drug use questions deferred to the physician.  Social History     Social History Narrative   • Not on file       Medications:  Available home medication information reviewed.  Medications Prior to Admission   Medication Sig Dispense Refill Last Dose   • enalapril (VASOTEC) 10 MG tablet Take 10 mg by mouth Daily.   Unknown at Unknown time   • metFORMIN (GLUCOPHAGE) 500 MG tablet Take 500 mg by mouth 2 (Two) Times a Day With Meals.   Unknown at Unknown time       No Known Allergies    Objective   Objective     Vital Signs:   Temp:  [97.7 °F (36.5 °C)-97.8 °F (36.6 °C)] 97.7 °F (36.5 °C)  Heart Rate:  [71-94] 77  Resp:  [16-20] 18  BP: (105-151)/() 117/69  Flow (L/min):  [5] 5       Physical Exam   Constitutional: Awake, alert, pleasant  Eyes: PERRLA, sclerae anicteric, no conjunctival injection  HENT: NCAT, mucous membranes moist  Neck: Supple, no thyromegaly, no lymphadenopathy, trachea midline  Respiratory: markedly diminished left, nonlabored respirations   Cardiovascular: RRR, no murmurs, rubs, or gallops, palpable pedal pulses bilaterally  Gastrointestinal: Positive bowel sounds, soft, nontender, nondistended  Musculoskeletal: Tr bilateral ankle edema, no clubbing or cyanosis to extremities  Psychiatric: Appropriate affect, cooperative  Neurologic: Oriented x 3, strength symmetric in all extremities, Cranial Nerves grossly intact to confrontation, speech clear  Skin: No rashes      Result Review:  I have personally reviewed the results from the time of this admission to 03/01/21 5:11 PM EST and agree with these findings:  [x]  Laboratory  []  Microbiology  [x]  Radiology  []  EKG/Telemetry   []  Cardiology/Vascular   []  Pathology  []  Old records  []  Other:  Most notable findings include: troponin 0.031.    \"large left pleural effusion " "  occupying most of the left hemithorax, with only a small amount of remaining aerated left lung. There is also a large right pleural effusion larger than it appears on plain film\"      LAB RESULTS:      Lab 03/01/21  1511 03/01/21  1234 03/01/21  1100   WBC  --   --  10.75   HEMOGLOBIN  --   --  15.0   HEMATOCRIT  --   --  47.6   PLATELETS  --   --  240   NEUTROS ABS  --   --  8.41*   IMMATURE GRANS (ABS)  --   --  0.05   LYMPHS ABS  --   --  1.17   MONOS ABS  --   --  0.96*   EOS ABS  --   --  0.08   MCV  --   --  88.8   PROCALCITONIN  --   --  0.04   LACTATE  --   --  1.9   LDH  --  134*  --    PROTIME 13.9  --   --          Lab 03/01/21  1100   SODIUM 141   POTASSIUM 4.2   CHLORIDE 101   CO2 28.0   ANION GAP 12.0   BUN 18   CREATININE 0.72*   GLUCOSE 172*   CALCIUM 9.3         Lab 03/01/21  1100   TOTAL PROTEIN 6.9   ALBUMIN 3.90   GLOBULIN 3.0   ALT (SGPT) 20   AST (SGOT) 25   BILIRUBIN 0.7   ALK PHOS 184*   LIPASE 14         Lab 03/01/21  1511 03/01/21  1234 03/01/21  1100   PROBNP  --   --  809.8   TROPONIN T  --  0.031* 0.031*   PROTIME 13.9  --   --    INR 1.2  --   --                  Brief Urine Lab Results     None        Microbiology Results (last 10 days)     Procedure Component Value - Date/Time    COVID PRE-OP / PRE-PROCEDURE SCREENING ORDER (NO ISOLATION) - Swab, Nasopharynx [[id number]]  (Normal) Collected: 03/01/21 1236    Lab Status: Final result Specimen: Swab from Nasopharynx Updated: 03/01/21 1341    Narrative:      The following orders were created for panel order COVID PRE-OP / PRE-PROCEDURE SCREENING ORDER (NO ISOLATION) - Swab, Nasopharynx.  Procedure                               Abnormality         Status                     ---------                               -----------         ------                     COVID-19 and FLU A/B PCR...[[id number]]  Normal              Final result                 Please view results for these tests on the individual orders.    COVID-19 and FLU A/B PCR - " Swab, Nasopharynx [[id number]]  (Normal) Collected: 03/01/21 1236    Lab Status: Final result Specimen: Swab from Nasopharynx Updated: 03/01/21 1341     COVID19 Not Detected     Influenza A PCR Not Detected     Influenza B PCR Not Detected    Narrative:      Fact sheet for providers: https://www.fda.gov/media/515311/download    Fact sheet for patients: https://www.fda.gov/media/054918/download    Test performed by PCR.          Ct Abdomen Pelvis With Contrast    Result Date: 3/1/2021  EXAMINATION: CT ANGIOGRAM CHEST-, CT ABDOMEN/PELVIS W CONTRAST-03/01/2021:  INDICATION: Severe dyspnea on exertion; large pleural effusion on CXR.  TECHNIQUE: Spiral acquisition 3 mm post-IV contrast images through the chest with sagittal and coronal 2-D reconstructions, and 5 mm post-IV contrast portal venous phase and delayed venous phase images through the abdomen and pelvis.  The radiation dose reduction device was turned on for each scan per the ALARA (As Low as Reasonably Achievable) protocol.  COMPARISON: Portable chest radiograph of same date.  FINDINGS:  CT ANGIOGRAM CHEST:  There is a very large left pleural effusion occupying most of the left hemithorax, with only a small amount of remaining aerated left lung. There is also a large right pleural effusion larger than it appears on plain film. Mediastinal window images show no evidence of mediastinal mass or adenopathy. There is good contrast opacification of the pulmonary arteries and no evidence of pulmonary embolic disease. The remaining non-atelectatic portions of the lungs appear clear except for mild nonspecific interstitial changes.      Impression: 1. No evidence of pulmonary embolus. 2. Very large left pleural effusion, and atelectasis of most of the left lung. 3. Large right pleural effusion with atelectasis of a moderate amount of right lower lobe. Minor nonspecific interstitial lung changes seen elsewhere.  ABDOMEN AND PELVIS CT SCAN WITH IV CONTRAST: There is  mild-to- moderate ascites, adjacent the liver and spleen and in the upper paracolic gutters. There is a very small amount of intrapelvic free fluid. There is diffuse fatty liver change, and somewhat asymmetric enhancement pattern of the right and left liver lobes initially, but uniform and normal in appearance of the liver on the delayed series. As can be appreciated on images 25-30, there is dense opacification of the main portal vein, right portal vein, splenic vein with contrast, but no visible portal vein contrast in the left portal vein, suggesting thrombosis and occlusion. There is perhaps mild narrowing of the distal main portal vein at the confluence of the right and left portal veins, axial image 29-series 8. Superior mesenteric vein is incompletely contrast opacified but this may be due to venous mixing, rather than thrombus.  The spleen is in the upper range of normal at 12.5 cm. There is upper abdomen fat stranding although this may represent edema. The appearance somewhat resembles omental metastatic disease, particularly in the right mid abdomen, images 46-49 of series 8. The bowel loops are normal in caliber. There is a large simple appearing right renal cyst. The kidneys otherwise appear unremarkable. The pancreas, gallbladder, and adrenal glands appear unremarkable.  Regarding the lower abdomen and pelvis, no mass or adenopathy is seen. There is a relatively small amount of pelvic ascites. Delayed venous phase images show contrast opacification of normal caliber renal collecting systems, ureters and bladder. The bony structures appear grossly intact.  IMPRESSION: 1. Mild-to-moderate ascites. 2. Apparently thrombosed left portal vein, with no visible portal vein contrast in the left lobe, and delayed parenchymal enhancement as a result. 3. Rather dense reticular disease of the right upper quadrant omental fat, possibly related to the patient's ascites but worrisome for omental metastatic disease.  Similar milder changes of the omental fat elsewhere.  NOTE: Preliminary findings of this exam were discussed with the ordering provider, MARK Travis, at 2:20 PM 03/01/2021.  D:  03/01/2021 E:  03/01/2021       Xr Chest 1 View    Result Date: 3/1/2021  EXAMINATION: XR CHEST 1 VW-  INDICATION: Chest pain triage protocol.  COMPARISON: None.  FINDINGS: There is opacification of most of the left hemithorax by pleural effusion. There is moderately extensive right basilar atelectasis plus/minus effusion. The heart is enlarged. The vasculature appears upper normal size.      Impression: 1. Opacification of most of the left hemithorax, apparently by pleural effusion. 2. Moderately extensive right basilar atelectasis and effusion. 3. Cardiomegaly without evidence of overt congestive failure.   D:  03/01/2021 E:  03/01/2021      Ct Angiogram Chest    Result Date: 3/1/2021  EXAMINATION: CT ANGIOGRAM CHEST-, CT ABDOMEN/PELVIS W CONTRAST-03/01/2021:  INDICATION: Severe dyspnea on exertion; large pleural effusion on CXR.  TECHNIQUE: Spiral acquisition 3 mm post-IV contrast images through the chest with sagittal and coronal 2-D reconstructions, and 5 mm post-IV contrast portal venous phase and delayed venous phase images through the abdomen and pelvis.  The radiation dose reduction device was turned on for each scan per the ALARA (As Low as Reasonably Achievable) protocol.  COMPARISON: Portable chest radiograph of same date.  FINDINGS:  CT ANGIOGRAM CHEST:  There is a very large left pleural effusion occupying most of the left hemithorax, with only a small amount of remaining aerated left lung. There is also a large right pleural effusion larger than it appears on plain film. Mediastinal window images show no evidence of mediastinal mass or adenopathy. There is good contrast opacification of the pulmonary arteries and no evidence of pulmonary embolic disease. The remaining non-atelectatic portions of the lungs appear clear  except for mild nonspecific interstitial changes.      Impression: 1. No evidence of pulmonary embolus. 2. Very large left pleural effusion, and atelectasis of most of the left lung. 3. Large right pleural effusion with atelectasis of a moderate amount of right lower lobe. Minor nonspecific interstitial lung changes seen elsewhere.  ABDOMEN AND PELVIS CT SCAN WITH IV CONTRAST: There is mild-to- moderate ascites, adjacent the liver and spleen and in the upper paracolic gutters. There is a very small amount of intrapelvic free fluid. There is diffuse fatty liver change, and somewhat asymmetric enhancement pattern of the right and left liver lobes initially, but uniform and normal in appearance of the liver on the delayed series. As can be appreciated on images 25-30, there is dense opacification of the main portal vein, right portal vein, splenic vein with contrast, but no visible portal vein contrast in the left portal vein, suggesting thrombosis and occlusion. There is perhaps mild narrowing of the distal main portal vein at the confluence of the right and left portal veins, axial image 29-series 8. Superior mesenteric vein is incompletely contrast opacified but this may be due to venous mixing, rather than thrombus.  The spleen is in the upper range of normal at 12.5 cm. There is upper abdomen fat stranding although this may represent edema. The appearance somewhat resembles omental metastatic disease, particularly in the right mid abdomen, images 46-49 of series 8. The bowel loops are normal in caliber. There is a large simple appearing right renal cyst. The kidneys otherwise appear unremarkable. The pancreas, gallbladder, and adrenal glands appear unremarkable.  Regarding the lower abdomen and pelvis, no mass or adenopathy is seen. There is a relatively small amount of pelvic ascites. Delayed venous phase images show contrast opacification of normal caliber renal collecting systems, ureters and bladder. The bony  structures appear grossly intact.  IMPRESSION: 1. Mild-to-moderate ascites. 2. Apparently thrombosed left portal vein, with no visible portal vein contrast in the left lobe, and delayed parenchymal enhancement as a result. 3. Rather dense reticular disease of the right upper quadrant omental fat, possibly related to the patient's ascites but worrisome for omental metastatic disease. Similar milder changes of the omental fat elsewhere.  NOTE: Preliminary findings of this exam were discussed with the ordering provider, MARK Travis, at 2:20 PM 03/01/2021.  D:  03/01/2021 E:  03/01/2021       Ct Guided Thoracentesis    Result Date: 3/1/2021  PROCEDURE: CT-guided thoracentesis  Procedural Personnel Attending physician(s): YANI Graff M.D. Fellow physician(s): None Resident physician(s): None Advanced practice provider(s): None  Pre-procedure diagnosis: ER with SOA. Left >right pleural effusion Post-procedure diagnosis: Same Indication: Diagnostic/therapeutic Additional clinical history: None  Complications: No immediate complications.      Impression:  CT-guided thoracentesis with drainage of 2000 mL of dark yellow/cloudy fluid. Portion of fluid sent for requested laboratory analysis.  Plan:  Resume care by clinical team. _______________________________________________________________  PROCEDURE SUMMARY: - Limited thoracic CT - CT-guided thoracentesis - Additional procedure(s): None  PROCEDURE DETAILS:  Pre-procedure Consent: Informed consent for the procedure including risks, benefits and alternatives was obtained and time-out was performed prior to the procedure. Preparation: The site was prepared and draped using maximal sterile barrier technique including cutaneous antisepsis.  Anesthesia/sedation Level of anesthesia/sedation: No sedation  Limited thoracic CT Limited thoracic CT was performed. A safe window for thoracentesis was identified. Left hemithorax findings: Large pleural fluid collection  Thoracentesis  Local anesthesia was administered. The pleural space was accessed using trocar technique  and fluid return confirmed position. The fluid was drained. The catheter was removed, and a sterile bandage was applied. Catheter placed: 6.5 [place] nonlocking pigtail Post-drainage hemithorax findings: Significant residual pleural fluid collection. No immediate complication  Radiation Dose CT dose length product (mGy-cm): 423  Additional Details Additional description of procedure: None Equipment details: None Specimens removed: Pleural fluid Estimated blood loss (mL): Less than 10 Standardized report: Thoracentesis  Attestation I was present and scrubbed for the entire procedure. Imaging reviewed. Agree with final report as written.   This report was finalized on 3/1/2021 4:21 PM by Raheem Graff.             Assessment/Plan   Assessment & Plan     Active Hospital Problems    Diagnosis POA   • **Pleural effusion on left [J90] Yes   • Elevated troponin [R77.8] Yes   • Diabetes mellitus (CMS/HCC) [E11.9] Yes   • Ascites [R18.8] Yes   • Portal vein thrombosis [I81] Yes     Danny Alvarez Jr. is a 75 y.o. male with PMH significant for HTN and hyperglycemia who was admitted on 3/1/21 for very large left pleural effusion with associated dyspnea and hypoxia at home.  There is concern for malignant disease as there is dense reticular disease in the patient RUQ omental fat as well as thrombosed portal vein.    Thoracentesis today in IR, fluid studies ordered including cytology  Heparin drip due to portal vein thrombosis - pharmacy to dose  GI consultation in the AM  Further consultation depending on study results    Hb A1C in AM  SSI insulin per accucheck    Trend troponin  No chest pain      DVT prophylaxis:  Heparin drip      CODE STATUS:    Code Status and Medical Interventions:   Ordered at: 03/01/21 2850     Code Status:    CPR     Medical Interventions (Level of Support Prior to Arrest):    Full       Admission Status:  I believe  this patient meets inpatient criteria due to new large pleural effusion requiring further workup and procedure, possible metastatic disease.    Cassi Kaplan MD  03/01/21

## 2021-03-01 NOTE — POST-PROCEDURE NOTE
Interventional Radiology Operative Note    Date: 03/01/21     Time: 16:16 EST     Pre-op Diagnosis: ER with SOA. Left >right pleural effusion  Post-op Diagnosis: Same    Procedure: CT guided left side thoracentesis    Surgeon: YANI Graff M.D.  Assistants: None    Sedation: None    Estimated Blood Loss (EBL): Trace     Urine Output (UOP): N/A (short procedure)    IVF: N/A (short procedure)    Findings: Large left side pleural effusion    Specimens: 2000 mL dark yellow/cloudy fluid. Portion sent for requested laboratory analysis.    Complications: No immediate    Disposition: Back to room. Stable

## 2021-03-01 NOTE — PROGRESS NOTES
HEPARIN INFUSION  Danny Alvarez Jr. is a  75 y.o. male receiving heparin infusion.             Therapy for (VTE/Cardiac):   VTE  Patient Weight: 74.7  Initial Bolus (Y/N):   Yes  Any Bolus (Y/N):   Yes        Signs or Symptoms of Bleeding: none noted      Recommend Xa every 6 hours.     VTE (PE/DVT)   Initial Bolus: 80 units/kg (Max 10,000 units)  Initial rate: 18 units/kg/hr (Max 1,500 units/hr)      (Anti-Xa) (IU/mL) Bolus Dose Stop Infusion Rate Change Repeat (Anti-Xa)     ? 0.19 80 units/kg 0 hrs Increase rate by   4 units/kg/hr 6 hrs      0.2 - 0.29 40 units/kg 0 hrs Increase rate by 2 units/kg/hr 6 hrs    0.3 - 0.7  0 0 hrs No change 6 hrs      0.71 - 0.99   0  0 hrs Decrease rate by 2 units/kg/hr 6 hrs      ? 1 0 Hold 1 hr Decrease rate by 3 units/kg/hr 6 hrs            Results from last 7 days   Lab Units 03/01/21  1511 03/01/21  1100   INR  1.2  --    HEMOGLOBIN g/dL  --  15.0   HEMATOCRIT %  --  47.6   PLATELETS 10*3/mm3  --  240          Date   Time   Anti-Xa Current Rate (Unit/kg/hr) Bolus   (Units) Rate Change   (Unit/kg/hr) New Rate (Unit/kg/hr) Next   Anti-Xa Comments  Pump Check Daily   3/1 1743 pending -- 6000 +18 18 0000 DW BERKLEY Lomeli, PharmD  3/1/2021  17:13 EST

## 2021-03-02 ENCOUNTER — APPOINTMENT (OUTPATIENT)
Dept: GENERAL RADIOLOGY | Facility: HOSPITAL | Age: 76
End: 2021-03-02

## 2021-03-02 ENCOUNTER — APPOINTMENT (OUTPATIENT)
Dept: CARDIOLOGY | Facility: HOSPITAL | Age: 76
End: 2021-03-02

## 2021-03-02 LAB
ALBUMIN SERPL-MCNC: 2.9 G/DL (ref 3.5–5.2)
ALBUMIN/GLOB SERPL: 1.3 G/DL
ALP SERPL-CCNC: 137 U/L (ref 39–117)
ALT SERPL W P-5'-P-CCNC: 16 U/L (ref 1–41)
ANION GAP SERPL CALCULATED.3IONS-SCNC: 8 MMOL/L (ref 5–15)
AST SERPL-CCNC: 23 U/L (ref 1–40)
BILIRUB SERPL-MCNC: 0.5 MG/DL (ref 0–1.2)
BUN SERPL-MCNC: 16 MG/DL (ref 8–23)
BUN/CREAT SERPL: 26.7 (ref 7–25)
CALCIUM SPEC-SCNC: 8.5 MG/DL (ref 8.6–10.5)
CHLORIDE SERPL-SCNC: 104 MMOL/L (ref 98–107)
CO2 SERPL-SCNC: 28 MMOL/L (ref 22–29)
CREAT SERPL-MCNC: 0.6 MG/DL (ref 0.76–1.27)
DEPRECATED RDW RBC AUTO: 41.9 FL (ref 37–54)
ERYTHROCYTE [DISTWIDTH] IN BLOOD BY AUTOMATED COUNT: 12.8 % (ref 12.3–15.4)
GFR SERPL CREATININE-BSD FRML MDRD: 131 ML/MIN/1.73
GLOBULIN UR ELPH-MCNC: 2.3 GM/DL
GLUCOSE BLDC GLUCOMTR-MCNC: 161 MG/DL (ref 70–130)
GLUCOSE BLDC GLUCOMTR-MCNC: 180 MG/DL (ref 70–130)
GLUCOSE BLDC GLUCOMTR-MCNC: 184 MG/DL (ref 70–130)
GLUCOSE BLDC GLUCOMTR-MCNC: 190 MG/DL (ref 70–130)
GLUCOSE SERPL-MCNC: 133 MG/DL (ref 65–99)
HBA1C MFR BLD: 9.2 % (ref 4.8–5.6)
HCT VFR BLD AUTO: 40.8 % (ref 37.5–51)
HGB BLD-MCNC: 12.9 G/DL (ref 13–17.7)
MCH RBC QN AUTO: 28.4 PG (ref 26.6–33)
MCHC RBC AUTO-ENTMCNC: 31.6 G/DL (ref 31.5–35.7)
MCV RBC AUTO: 89.9 FL (ref 79–97)
PLATELET # BLD AUTO: 167 10*3/MM3 (ref 140–450)
PMV BLD AUTO: 10.7 FL (ref 6–12)
POTASSIUM SERPL-SCNC: 4.1 MMOL/L (ref 3.5–5.2)
PROT SERPL-MCNC: 5.2 G/DL (ref 6–8.5)
RBC # BLD AUTO: 4.54 10*6/MM3 (ref 4.14–5.8)
SODIUM SERPL-SCNC: 140 MMOL/L (ref 136–145)
TROPONIN T SERPL-MCNC: 0.12 NG/ML (ref 0–0.03)
TROPONIN T SERPL-MCNC: 0.14 NG/ML (ref 0–0.03)
UFH PPP CHRO-ACNC: 0.42 IU/ML (ref 0.3–0.7)
UFH PPP CHRO-ACNC: 0.48 IU/ML (ref 0.3–0.7)
UFH PPP CHRO-ACNC: 0.5 IU/ML (ref 0.3–0.7)
UFH PPP CHRO-ACNC: 0.59 IU/ML (ref 0.3–0.7)
WBC # BLD AUTO: 8.23 10*3/MM3 (ref 3.4–10.8)

## 2021-03-02 PROCEDURE — 71046 X-RAY EXAM CHEST 2 VIEWS: CPT

## 2021-03-02 PROCEDURE — 93975 VASCULAR STUDY: CPT

## 2021-03-02 PROCEDURE — 85520 HEPARIN ASSAY: CPT

## 2021-03-02 PROCEDURE — 63710000001 INSULIN LISPRO (HUMAN) PER 5 UNITS: Performed by: FAMILY MEDICINE

## 2021-03-02 PROCEDURE — 84484 ASSAY OF TROPONIN QUANT: CPT | Performed by: FAMILY MEDICINE

## 2021-03-02 PROCEDURE — 88112 CYTOPATH CELL ENHANCE TECH: CPT | Performed by: INTERNAL MEDICINE

## 2021-03-02 PROCEDURE — 99221 1ST HOSP IP/OBS SF/LOW 40: CPT | Performed by: PHYSICIAN ASSISTANT

## 2021-03-02 PROCEDURE — 82962 GLUCOSE BLOOD TEST: CPT

## 2021-03-02 PROCEDURE — 88305 TISSUE EXAM BY PATHOLOGIST: CPT | Performed by: INTERNAL MEDICINE

## 2021-03-02 PROCEDURE — 88341 IMHCHEM/IMCYTCHM EA ADD ANTB: CPT | Performed by: INTERNAL MEDICINE

## 2021-03-02 PROCEDURE — 80053 COMPREHEN METABOLIC PANEL: CPT | Performed by: FAMILY MEDICINE

## 2021-03-02 PROCEDURE — 99233 SBSQ HOSP IP/OBS HIGH 50: CPT | Performed by: INTERNAL MEDICINE

## 2021-03-02 PROCEDURE — 84484 ASSAY OF TROPONIN QUANT: CPT | Performed by: INTERNAL MEDICINE

## 2021-03-02 PROCEDURE — 83036 HEMOGLOBIN GLYCOSYLATED A1C: CPT | Performed by: FAMILY MEDICINE

## 2021-03-02 PROCEDURE — 88342 IMHCHEM/IMCYTCHM 1ST ANTB: CPT | Performed by: INTERNAL MEDICINE

## 2021-03-02 PROCEDURE — 85027 COMPLETE CBC AUTOMATED: CPT | Performed by: FAMILY MEDICINE

## 2021-03-02 PROCEDURE — 25010000002 HEPARIN (PORCINE) 25000-0.45 UT/250ML-% SOLUTION

## 2021-03-02 RX ORDER — ASPIRIN 81 MG/1
81 TABLET ORAL DAILY
Status: DISCONTINUED | OUTPATIENT
Start: 2021-03-03 | End: 2021-03-03

## 2021-03-02 RX ADMIN — INSULIN LISPRO 2 UNITS: 100 INJECTION, SOLUTION INTRAVENOUS; SUBCUTANEOUS at 17:49

## 2021-03-02 RX ADMIN — INSULIN LISPRO 2 UNITS: 100 INJECTION, SOLUTION INTRAVENOUS; SUBCUTANEOUS at 13:02

## 2021-03-02 RX ADMIN — INSULIN LISPRO 2 UNITS: 100 INJECTION, SOLUTION INTRAVENOUS; SUBCUTANEOUS at 10:35

## 2021-03-02 RX ADMIN — SODIUM CHLORIDE, PRESERVATIVE FREE 10 ML: 5 INJECTION INTRAVENOUS at 20:20

## 2021-03-02 RX ADMIN — HEPARIN SODIUM 18 UNITS/KG/HR: 10000 INJECTION, SOLUTION INTRAVENOUS at 11:07

## 2021-03-02 NOTE — PLAN OF CARE
Goal Outcome Evaluation:  Plan of Care Reviewed With: patient  Progress: no change  Outcome Summary: VSS. Pt. has rested well throughout the shift. No c/o pain. On 2L NC. NSR on monitor with PAC's. Will continue to monitor and reassess needs throughout shift.

## 2021-03-02 NOTE — CONSULTS
[facility]    Referring Provider: Jef Alcantar DO   PCP: Jef Beckham MD    Reason for Consultation: Portal vein thrombosis     Chief complaint: Dyspnea     History of present illness:    Danny Alvarez Jr. is a 75 y.o. male who is admitted with dyspnea.   He describes progressive dyspnea for 2-3 months with acute worsening over the last week.  He is found to have large bilateral pleural effusion with a very large effusion on the left.  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 He denies any history liver disease.   He drinks alcohol rarely (1 beer or wine every 2 weeks).  He states his parents were alcoholics and he has tried to avoid heavy use.   He does not smoke.   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  He denies any abdominal pain, nausea, vomiting, fever nor chills.      Allergies:  Patient has no known allergies.    Scheduled Meds:  aspirin, 324 mg, Oral, Once  enalapril, 10 mg, Oral, Daily  insulin lispro, 0-7 Units, Subcutaneous, TID AC  lidocaine PF 1%, 20 mL, Injection, Once  sodium chloride, 10 mL, Intravenous, Q12H         Infusions:  heparin, 18 Units/kg/hr, Last Rate: 18 Units/kg/hr (03/02/21 1107)  Pharmacy to Dose Heparin,         PRN Meds:  •  acetaminophen **OR** acetaminophen **OR** acetaminophen  •  bisacodyl  •  bisacodyl  •  calcium carbonate  •  dextrose  •  dextrose  •  glucagon (human recombinant)  •  magnesium sulfate **OR** magnesium sulfate **OR** magnesium sulfate  •  melatonin  •  ondansetron **OR** ondansetron  •  Pharmacy to Dose Heparin  •  potassium chloride **OR** potassium chloride **OR** potassium chloride  •  senna-docusate sodium  •  sodium chloride  •  sodium chloride    Home Meds:  Medications Prior to Admission   Medication Sig Dispense Refill Last Dose   • enalapril (VASOTEC) 10 MG tablet Take 10 mg by mouth Daily.   Unknown  "at Unknown time   • metFORMIN (GLUCOPHAGE) 500 MG tablet Take 500 mg by mouth 2 (Two) Times a Day With Meals.   Unknown at Unknown time       ROS: Review of Systems   Constitutional: Positive for fatigue.   HENT: Negative.    Eyes: Negative.    Respiratory: Positive for shortness of breath.    Cardiovascular: Negative.    Gastrointestinal: Negative.    Endocrine: Negative.    Genitourinary: Negative.    Musculoskeletal: Negative.    Skin: Negative.    Allergic/Immunologic: Negative.    Neurological: Positive for weakness.   Hematological: Negative.    Psychiatric/Behavioral: Negative.        PAST MED HX:  Past Medical History:   Diagnosis Date   • Diabetes mellitus (CMS/HCC)    • Hypertension        PAST SURG HX:  Past Surgical History:   Procedure Laterality Date   • CLAVICLE SURGERY         FAM HX:  History reviewed. No pertinent family history.    SOC HX:  Social History     Socioeconomic History   • Marital status:      Spouse name: Not on file   • Number of children: Not on file   • Years of education: Not on file   • Highest education level: Not on file   Tobacco Use   • Smoking status: Never Smoker   • Smokeless tobacco: Never Used   Substance and Sexual Activity   • Alcohol use: Yes     Comment: 1 glass of wine per week   • Drug use: Defer   • Sexual activity: Defer       PHYSICAL EXAM  /73 (BP Location: Right arm, Patient Position: Lying)   Pulse 71   Temp 97.8 °F (36.6 °C) (Oral)   Resp 18   Ht 177.8 cm (70\")   Wt 74.8 kg (164 lb 12.8 oz)   SpO2 97%   BMI 23.65 kg/m²   Wt Readings from Last 3 Encounters:   03/01/21 74.8 kg (164 lb 12.8 oz)   ,body mass index is 23.65 kg/m².  Physical Exam  Constitutional:       General: He is not in acute distress.  HENT:      Head: Normocephalic and atraumatic.   Eyes:      General: No scleral icterus.  Neck:      Musculoskeletal: Normal range of motion.   Cardiovascular:      Rate and Rhythm: Normal rate and regular rhythm.   Pulmonary:      Effort: " Pulmonary effort is normal.      Comments: Decreased breath sounds bilaterally   Abdominal:      General: Bowel sounds are normal.      Palpations: Abdomen is soft.      Tenderness: There is no abdominal tenderness.   Musculoskeletal:      Right lower leg: No edema.      Left lower leg: No edema.   Skin:     General: Skin is warm and dry.   Neurological:      General: No focal deficit present.      Mental Status: He is alert and oriented to person, place, and time.   Psychiatric:         Behavior: Behavior normal.         Thought Content: Thought content normal.       Results Review:   I reviewed the patient's new clinical results.    Lab Results   Component Value Date    WBC 8.23 03/02/2021    HGB 12.9 (L) 03/02/2021    HGB 13.1 03/01/2021    HGB 15.0 03/01/2021    HCT 40.8 03/02/2021    MCV 89.9 03/02/2021     03/02/2021       Lab Results   Component Value Date    INR 1.14 03/01/2021    INR 1.2 03/01/2021       Lab Results   Component Value Date    GLUCOSE 133 (H) 03/02/2021    BUN 16 03/02/2021    CREATININE 0.60 (L) 03/02/2021    EGFRIFNONA 131 03/02/2021    BCR 26.7 (H) 03/02/2021     03/02/2021    K 4.1 03/02/2021    CO2 28.0 03/02/2021    CALCIUM 8.5 (L) 03/02/2021    ALBUMIN 2.90 (L) 03/02/2021    ALKPHOS 137 (H) 03/02/2021    BILITOT 0.5 03/02/2021    ALT 16 03/02/2021    AST 23 03/02/2021     CT Abdomen, Pelvis and Chest: (as interpreted by radiologist)  CT ANGIOGRAM CHEST:  There is a very large left pleural effusion  occupying most of the left hemithorax, with only a small amount of  remaining aerated left lung. There is also a large right pleural  effusion larger than it appears on plain film. Mediastinal window images  show no evidence of mediastinal mass or adenopathy. There is good  contrast opacification of the pulmonary arteries and no evidence of  pulmonary embolic disease. The remaining non-atelectatic portions of the  lungs appear clear except for mild nonspecific interstitial  changes.   IMPRESSION:  1. No evidence of pulmonary embolus.   2. Very large left pleural effusion, and atelectasis of most of the left  lung.  3. Large right pleural effusion with atelectasis of a moderate amount of  right lower lobe. Minor nonspecific interstitial lung changes seen  elsewhere.   ABDOMEN AND PELVIS CT SCAN WITH IV CONTRAST:   There is mild-to- moderate  ascites, adjacent the liver and spleen and in the upper paracolic  gutters. There is a very small amount of intrapelvic free fluid. There  is diffuse fatty liver change, and somewhat asymmetric enhancement  pattern of the right and left liver lobes initially, but uniform and  normal in appearance of the liver on the delayed series. As can be  appreciated on images 25-30, there is dense opacification of the main  portal vein, right portal vein, splenic vein with contrast, but no  visible portal vein contrast in the left portal vein, suggesting  thrombosis and occlusion. There is perhaps mild narrowing of the distal  main portal vein at the confluence of the right and left portal veins,  axial image 29-series 8. Superior mesenteric vein is incompletely  contrast opacified but this may be due to venous mixing, rather than  thrombus.   The spleen is in the upper range of normal at 12.5 cm. There is upper  abdomen fat stranding although this may represent edema. The appearance  somewhat resembles omental metastatic disease, particularly in the right  mid abdomen, images 46-49 of series 8. The bowel loops are normal in  caliber. There is a large simple appearing right renal cyst. The kidneys  otherwise appear unremarkable. The pancreas, gallbladder, and adrenal  glands appear unremarkable.   Regarding the lower abdomen and pelvis, no mass or adenopathy is seen.  There is a relatively small amount of pelvic ascites. Delayed venous  phase images show contrast opacification of normal caliber renal  collecting systems, ureters and bladder. The bony structures  appear  grossly intact.   IMPRESSION:   1. Mild-to-moderate ascites.  2. Apparently thrombosed left portal vein, with no visible portal vein  contrast in the left lobe, and delayed parenchymal enhancement as a  result.  3. Rather dense reticular disease of the right upper quadrant omental  fat, possibly related to the patient's ascites but worrisome for omental  metastatic disease. Similar milder changes of the omental fat elsewhere.    ASSESSMENTS/PLANS    1. Portal vein thrombosis   2. Large bilateral pleural effusions s/p left thoracentesis yesterday (2,000 mL removed)  3. Fatty liver with trace ascites   4. 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 Poorly controlled diabetes, A1c is 9     CT films personally reviewed with Dr. Brunner.  No clear evidence of underlying cirrhosis.   Unclear etiology of the dense reticular disease of the right upper quadrant omental fat.   He is status post thoracentesis yesterday with 2,000 mL removed.       >>> Recommend anticoagulation for the portal vein thrombosis for six months.  Can change to a NOAC from a GI standpoint.     >>> Repeat portal duplex in six months  >>> Will follow up cytology results from pleural fluid obtained yesterday      I discussed the patient's findings and my recommendations with patient    TIFFANY Cedeno  03/02/21  11:13 EST

## 2021-03-02 NOTE — PROGRESS NOTES
HEPARIN INFUSION  Danny Alvarez Jr. is a  75 y.o. male receiving heparin infusion.     Therapy for (VTE/Cardiac):   VTE  Patient Weight: 74.8 kg  Initial Bolus (Y/N):   Yes  Any Bolus (Y/N):   Yes        Signs or Symptoms of Bleeding: none noted    VTE (PE/DVT)   Initial Bolus: 80 units/kg (Max 10,000 units)  Initial rate: 18 units/kg/hr (Max 1,500 units/hr)      (Anti-Xa) (IU/mL) Bolus Dose Stop Infusion Rate Change Repeat (Anti-Xa)     ? 0.19 80 units/kg 0 hrs Increase rate by   4 units/kg/hr 6 hrs      0.2 - 0.29 40 units/kg 0 hrs Increase rate by 2 units/kg/hr 6 hrs    0.3 - 0.7  0 0 hrs No change 6 hrs      0.71 - 0.99   0  0 hrs Decrease rate by 2 units/kg/hr 6 hrs      ? 1 0 Hold 1 hr Decrease rate by 3 units/kg/hr 6 hrs      Results from last 7 days   Lab Units 03/02/21  0409 03/01/21  1726 03/01/21  1511 03/01/21  1100   INR   --  1.14 1.2  --    HEMOGLOBIN g/dL 12.9* 13.1  --  15.0   HEMATOCRIT % 40.8 42.6  --  47.6   PLATELETS 10*3/mm3 167 173  --  240          Date   Time   Anti-Xa Current Rate (Unit/kg/hr) Bolus   (Units) Rate Change   (Unit/kg/hr) New Rate (Unit/kg/hr) Next   Anti-Xa Comments  Pump Check Daily   3/1 1743 0.10 -- 6000 +18 18 0000 DW RN   3/1 2329 0.59 18 -- -- 18 0600 Discussed w/ nurse   3/2 0409 0.5 18 -- -- 18 1200 BERKLEY Win, PharmD  3/2/2021  07:45 EST

## 2021-03-02 NOTE — PROGRESS NOTES
[facility] Medicine Services  PROGRESS NOTE    Patient Name: Danny Alvarez Jr.  : 1945  MRN: [id number]    Date of Admission: 3/1/2021  Primary Care Physician: Jef Beckham MD    Subjective   Subjective     CC:  Follow-up pleural effusion    HPI:  Breathing feels way better after range of pleural effusion yesterday.  Reports resolution/improvement of orthopnea.  No sputum production.    ROS:  Gen- No fevers, chills  CV- No chest pain, palpitations  Resp- No cough, dyspnea  GI- No N/V/D, abd pain    Objective   Objective     Vital Signs:   Temp:  [97.3 °F (36.3 °C)-97.8 °F (36.6 °C)] 97.5 °F (36.4 °C)  Heart Rate:  [69-82] 82  Resp:  [18] 18  BP: ()/(57-73) 91/57        Physical Exam:  Constitutional: No acute distress, awake, alert, sitting up in bed  HENT: NCAT, mucous membranes moist  Respiratory: Diminished breath sounds bilateral bases, respiratory effort normal   Cardiovascular: RRR, no murmurs, rubs, or gallops, palpable radial pulse bilaterally  Gastrointestinal: Positive bowel sounds, soft, nontender, nondistended  Musculoskeletal: No bilateral ankle edema  Psychiatric: Appropriate affect, cooperative  Neurologic: Speech clear, moving all extremities equally    Results Reviewed:  Results from last 7 days   Lab Units 21  04021  17221  1511 21  1100   WBC 10*3/mm3 8.23 7.24  --  10.75   HEMOGLOBIN g/dL 12.9* 13.1  --  15.0   HEMATOCRIT % 40.8 42.6  --  47.6   PLATELETS 10*3/mm3 167 173  --  240   INR   --  1.14 1.2  --    PROCALCITONIN ng/mL  --   --   --  0.04     Results from last 7 days   Lab Units 21  0813 21  0409 21  1726  21  1100   SODIUM mmol/L  --  140  --   --  141   POTASSIUM mmol/L  --  4.1  --   --  4.2   CHLORIDE mmol/L  --  104  --   --  101   CO2 mmol/L  --  28.0  --   --  28.0   BUN mg/dL  --  16  --   --  18   CREATININE mg/dL  --  0.60*  --   --  0.72*   GLUCOSE mg/dL  --  133*  --   --  172*    CALCIUM mg/dL  --  8.5*  --   --  9.3   ALT (SGPT) U/L  --  16  --   --  20   AST (SGOT) U/L  --  23  --   --  25   TROPONIN T ng/mL 0.121* 0.142* 0.057*   < > 0.031*   PROBNP pg/mL  --   --   --   --  809.8    < > = values in this interval not displayed.     Estimated Creatinine Clearance: 84.4 mL/min (A) (by C-G formula based on SCr of 0.6 mg/dL (L)).    Microbiology Results Abnormal     Procedure Component Value - Date/Time    Blood Culture - Blood, Arm, Left [[id number]] Collected: 03/01/21 1230    Lab Status: Preliminary result Specimen: Blood from Arm, Left Updated: 03/02/21 1401     Blood Culture No growth at 24 hours    Blood Culture - Blood, Arm, Left [[id number]] Collected: 03/01/21 1234    Lab Status: Preliminary result Specimen: Blood from Arm, Left Updated: 03/02/21 1401     Blood Culture No growth at 24 hours    AFB Culture - Body Fluid, Pleural Cavity [[id number]] Collected: 03/01/21 1626    Lab Status: Preliminary result Specimen: Body Fluid from Pleural Cavity Updated: 03/02/21 1224     AFB Stain No acid fast bacilli seen on concentrated smear    Body Fluid Culture - Body Fluid, Pleural Cavity [[id number]] Collected: 03/01/21 1626    Lab Status: Preliminary result Specimen: Body Fluid from Pleural Cavity Updated: 03/02/21 0626     Body Fluid Culture No growth     Gram Stain Moderate (3+) WBCs per low power field      No organisms seen    COVID PRE-OP / PRE-PROCEDURE SCREENING ORDER (NO ISOLATION) - Swab, Nasopharynx [[id number]]  (Normal) Collected: 03/01/21 1236    Lab Status: Final result Specimen: Swab from Nasopharynx Updated: 03/01/21 1341    Narrative:      The following orders were created for panel order COVID PRE-OP / PRE-PROCEDURE SCREENING ORDER (NO ISOLATION) - Swab, Nasopharynx.  Procedure                               Abnormality         Status                     ---------                               -----------         ------                     COVID-19 and FLU A/B PCR...[[id number]]   Normal              Final result                 Please view results for these tests on the individual orders.    COVID-19 and FLU A/B PCR - Swab, Nasopharynx [[id number]]  (Normal) Collected: 03/01/21 1236    Lab Status: Final result Specimen: Swab from Nasopharynx Updated: 03/01/21 1341     COVID19 Not Detected     Influenza A PCR Not Detected     Influenza B PCR Not Detected    Narrative:      Fact sheet for providers: https://www.fda.gov/media/591075/download    Fact sheet for patients: https://www.fda.gov/media/874898/download    Test performed by PCR.          Imaging Results (Last 24 Hours)     Procedure Component Value Units Date/Time    XR Chest PA & Lateral [[id number]] Collected: 03/02/21 1338     Updated: 03/02/21 1345    Narrative:      EXAMINATION: XR CHEST, PA AND LATERAL-03/02/2021:      INDICATION: Port thoracentesis, concern for malignant effusion, eval for  revealed mass; M18-Avfuurr effusion, not elsewhere classified;  R93.5-Abnormal findings on diagnostic imaging of other abdominal  regions, including retroperitoneum; R06.00-Dyspnea, unspecified;  R77.8-Other specified abnormalities of plasma proteins.      COMPARISON: Chest x-ray 03/01/2021.     FINDINGS: Cardiac size enlarged and obscured from persistent  moderate-to- large volume left partially layering pleural effusion  although decreased from prior comparison status post thoracentesis.  Right basilar opacifications of small-to- moderate pleural effusion with  adjacent atelectasis versus airspace disease.       Impression:      Status post thoracentesis with decrease of left pleural  effusion, however, persistent moderate-to-large left effusion remaining  along with a small-to-moderate right pleural effusion additionally noted  and adjacent opacifications.     D:  03/02/2021  E:  03/02/2021             CT Angiogram Chest [[id number]] Collected: 03/01/21 1405     Updated: 03/01/21 2206    Narrative:      EXAMINATION: CT ANGIOGRAM CHEST-, CT  ABDOMEN/PELVIS W  CONTRAST-03/01/2021:      INDICATION: Severe dyspnea on exertion; large pleural effusion on CXR.     TECHNIQUE: Spiral acquisition 3 mm post-IV contrast images through the  chest with sagittal and coronal 2-D reconstructions, and 5 mm post-IV  contrast portal venous phase and delayed venous phase images through the  abdomen and pelvis.     The radiation dose reduction device was turned on for each scan per the  ALARA (As Low as Reasonably Achievable) protocol.     COMPARISON: Portable chest radiograph of same date.     FINDINGS:      CT ANGIOGRAM CHEST:  There is a very large left pleural effusion  occupying most of the left hemithorax, with only a small amount of  remaining aerated left lung. There is also a large right pleural  effusion larger than it appears on plain film. Mediastinal window images  show no evidence of mediastinal mass or adenopathy. There is good  contrast opacification of the pulmonary arteries and no evidence of  pulmonary embolic disease. The remaining non-atelectatic portions of the  lungs appear clear except for mild nonspecific interstitial changes.       Impression:      1. No evidence of pulmonary embolus.   2. Very large left pleural effusion, and atelectasis of most of the left  lung.  3. Large right pleural effusion with atelectasis of a moderate amount of  right lower lobe. Minor nonspecific interstitial lung changes seen  elsewhere.     ABDOMEN AND PELVIS CT SCAN WITH IV CONTRAST: There is mild-to- moderate  ascites, adjacent the liver and spleen and in the upper paracolic  gutters. There is a very small amount of intrapelvic free fluid. There  is diffuse fatty liver change, and somewhat asymmetric enhancement  pattern of the right and left liver lobes initially, but uniform and  normal in appearance of the liver on the delayed series. As can be  appreciated on images 25-30, there is dense opacification of the main  portal vein, right portal vein, splenic vein with  contrast, but no  visible portal vein contrast in the left portal vein, suggesting  thrombosis and occlusion. There is perhaps mild narrowing of the distal  main portal vein at the confluence of the right and left portal veins,  axial image 29-series 8. Superior mesenteric vein is incompletely  contrast opacified but this may be due to venous mixing, rather than  thrombus.     The spleen is in the upper range of normal at 12.5 cm. There is upper  abdomen fat stranding although this may represent edema. The appearance  somewhat resembles omental metastatic disease, particularly in the right  mid abdomen, images 46-49 of series 8. The bowel loops are normal in  caliber. There is a large simple appearing right renal cyst. The kidneys  otherwise appear unremarkable. The pancreas, gallbladder, and adrenal  glands appear unremarkable.     Regarding the lower abdomen and pelvis, no mass or adenopathy is seen.  There is a relatively small amount of pelvic ascites. Delayed venous  phase images show contrast opacification of normal caliber renal  collecting systems, ureters and bladder. The bony structures appear  grossly intact.     IMPRESSION:   1. Mild-to-moderate ascites.  2. Apparently thrombosed left portal vein, with no visible portal vein  contrast in the left lobe, and delayed parenchymal enhancement as a  result.  3. Rather dense reticular disease of the right upper quadrant omental  fat, possibly related to the patient's ascites but worrisome for omental  metastatic disease. Similar milder changes of the omental fat elsewhere.     NOTE: Preliminary findings of this exam were discussed with the ordering  provider, MARK Travis, at 2:20 PM 03/01/2021.     D:  03/01/2021  E:  03/01/2021     This report was finalized on 3/1/2021 10:03 PM by Dr. Michael Dennis MD.       CT Abdomen Pelvis With Contrast [[id number]] Collected: 03/01/21 1405     Updated: 03/01/21 2206    Narrative:      EXAMINATION: CT ANGIOGRAM CHEST-, CT  contrast, but no  visible portal vein contrast in the left portal vein, suggesting  thrombosis and occlusion. There is perhaps mild narrowing of the distal  main portal vein at the confluence of the right and left portal veins,  axial image 29-series 8. Superior mesenteric vein is incompletely  contrast opacified but this may be due to venous mixing, rather than  thrombus.     The spleen is in the upper range of normal at 12.5 cm. There is upper  abdomen fat stranding although this may represent edema. The appearance  somewhat resembles omental metastatic disease, particularly in the right  mid abdomen, images 46-49 of series 8. The bowel loops are normal in  caliber. There is a large simple appearing right renal cyst. The kidneys  otherwise appear unremarkable. The pancreas, gallbladder, and adrenal  glands appear unremarkable.     Regarding the lower abdomen and pelvis, no mass or adenopathy is seen.  There is a relatively small amount of pelvic ascites. Delayed venous  phase images show contrast opacification of normal caliber renal  collecting systems, ureters and bladder. The bony structures appear  grossly intact.     IMPRESSION:   1. Mild-to-moderate ascites.  2. Apparently thrombosed left portal vein, with no visible portal vein  contrast in the left lobe, and delayed parenchymal enhancement as a  result.  3. Rather dense reticular disease of the right upper quadrant omental  fat, possibly related to the patient's ascites but worrisome for omental  metastatic disease. Similar milder changes of the omental fat elsewhere.     NOTE: Preliminary findings of this exam were discussed with the ordering  provider, MARK Travis, at 2:20 PM 03/01/2021.     D:  03/01/2021  E:  03/01/2021     This report was finalized on 3/1/2021 10:03 PM by Dr. Michael Dennis MD.       XR Chest 1 View [[id number]] Collected: 03/01/21 1122     Updated: 03/01/21 2145    Narrative:      EXAMINATION: XR CHEST 1 VW-     INDICATION: Chest  pain triage protocol.      COMPARISON: None.     FINDINGS: There is opacification of most of the left hemithorax by  pleural effusion. There is moderately extensive right basilar  atelectasis plus/minus effusion. The heart is enlarged. The vasculature  appears upper normal size.       Impression:      1. Opacification of most of the left hemithorax, apparently by pleural  effusion.  2. Moderately extensive right basilar atelectasis and effusion.  3. Cardiomegaly without evidence of overt congestive failure.      D:  03/01/2021  E:  03/01/2021     This report was finalized on 3/1/2021 9:42 PM by Dr. Michael Dennis MD.                  I have reviewed the medications:  Scheduled Meds:aspirin, 324 mg, Oral, Once  enalapril, 10 mg, Oral, Daily  insulin lispro, 0-7 Units, Subcutaneous, TID AC  lidocaine PF 1%, 20 mL, Injection, Once  sodium chloride, 10 mL, Intravenous, Q12H      Continuous Infusions:heparin, 18 Units/kg/hr, Last Rate: 18 Units/kg/hr (03/02/21 1107)  Pharmacy to Dose Heparin,       PRN Meds:.•  acetaminophen **OR** acetaminophen **OR** acetaminophen  •  bisacodyl  •  bisacodyl  •  calcium carbonate  •  dextrose  •  dextrose  •  glucagon (human recombinant)  •  magnesium sulfate **OR** magnesium sulfate **OR** magnesium sulfate  •  melatonin  •  ondansetron **OR** ondansetron  •  Pharmacy to Dose Heparin  •  potassium chloride **OR** potassium chloride **OR** potassium chloride  •  senna-docusate sodium  •  sodium chloride  •  sodium chloride    Assessment/Plan   Assessment & Plan     Active Hospital Problems    Diagnosis  POA   • **Pleural effusion on left [J90]  Yes   • Elevated troponin [R77.8]  Yes   • Diabetes mellitus (CMS/HCC) [E11.9]  Yes   • Ascites [R18.8]  Yes   • Portal vein thrombosis [I81]  Yes      Resolved Hospital Problems   No resolved problems to display.        Brief Hospital Course to date:  Danny Alvarez Jr. is a 75 y.o. male with HTN, diabetes who presented with several months of  "progressive dyspnea/orthopnea/dyspnea on exertion, rapid progression over recent weeks, 50 pound weight loss; with a large LT pleural effusion and moderate RT pleural effusion s/p thoracentesis and additional finding of portal venous thrombosis    The following problems are new to me today    Assessment/plan    Acute hypoxic respiratory insufficiency  Bilateral exudative pleural effusions LT>RT  -CT abdomen with abnormal omentum, \"concerning for metastatic disease\"  -Continue O2 is required  -S/p LT thoracentesis 3/1/2021, exudative effusion  -High RBC count and pleural fluid  -Awaiting path, follow culture (not clinically consistent with pneumonia)  -Repeat chest radiograph today; ultimately will likely need CT  -Exudate, however given bilateral effusions and elevated troponins will check echo    NSTEMI, type 2  -EKG personally reviewed, no acute ischemic changes  -Troponin trending up overnight, repeat this a.m. until peak  -No chest pain    Portal venous thrombosis  Moderate volume ascites  -Noted on CT abdomen  -Portal venous duplex US today  -Continue heparin drip  -GI to see today    DM type 2, A1c 9.2%, without long-term use of insulin  -Continue Accu-Cheks with SSI, adjustments as appropriate    Recent dental infection  -Patient reports \"infected root canal\"  -Received x6 days antibiotic therapy PTA, unknown details    S/p COVID-19 vaccination  -Second dose on 2/27/2021    DVT Prophylaxis: Heparin drip    Disposition: I expect the patient to be discharged pending further evaluation/management of the patient's effusions.    CODE STATUS:   Code Status and Medical Interventions:   Ordered at: 03/01/21 1710     Code Status:    CPR     Medical Interventions (Level of Support Prior to Arrest):    Full       Jef Alcantar, DO  03/02/21              "

## 2021-03-03 ENCOUNTER — APPOINTMENT (OUTPATIENT)
Dept: CT IMAGING | Facility: HOSPITAL | Age: 76
End: 2021-03-03

## 2021-03-03 ENCOUNTER — APPOINTMENT (OUTPATIENT)
Dept: CARDIOLOGY | Facility: HOSPITAL | Age: 76
End: 2021-03-03

## 2021-03-03 ENCOUNTER — APPOINTMENT (OUTPATIENT)
Dept: GENERAL RADIOLOGY | Facility: HOSPITAL | Age: 76
End: 2021-03-03

## 2021-03-03 LAB
ABO GROUP BLD: NORMAL
ABO GROUP BLD: NORMAL
ANION GAP SERPL CALCULATED.3IONS-SCNC: 10 MMOL/L (ref 5–15)
BASOPHILS # BLD AUTO: 0.07 10*3/MM3 (ref 0–0.2)
BASOPHILS NFR BLD AUTO: 0.6 % (ref 0–1.5)
BLD GP AB SCN SERPL QL: NEGATIVE
BUN SERPL-MCNC: 18 MG/DL (ref 8–23)
BUN/CREAT SERPL: 24.3 (ref 7–25)
CALCIUM SPEC-SCNC: 8.3 MG/DL (ref 8.6–10.5)
CHLORIDE SERPL-SCNC: 97 MMOL/L (ref 98–107)
CO2 SERPL-SCNC: 24 MMOL/L (ref 22–29)
CREAT SERPL-MCNC: 0.74 MG/DL (ref 0.76–1.27)
DEPRECATED RDW RBC AUTO: 41.9 FL (ref 37–54)
EOSINOPHIL # BLD AUTO: 0.03 10*3/MM3 (ref 0–0.4)
EOSINOPHIL NFR BLD AUTO: 0.3 % (ref 0.3–6.2)
ERYTHROCYTE [DISTWIDTH] IN BLOOD BY AUTOMATED COUNT: 13 % (ref 12.3–15.4)
GFR SERPL CREATININE-BSD FRML MDRD: 103 ML/MIN/1.73
GLUCOSE BLDC GLUCOMTR-MCNC: 179 MG/DL (ref 70–130)
GLUCOSE BLDC GLUCOMTR-MCNC: 204 MG/DL (ref 70–130)
GLUCOSE BLDC GLUCOMTR-MCNC: 244 MG/DL (ref 70–130)
GLUCOSE BLDC GLUCOMTR-MCNC: 247 MG/DL (ref 70–130)
GLUCOSE BLDC GLUCOMTR-MCNC: 280 MG/DL (ref 70–130)
GLUCOSE SERPL-MCNC: 201 MG/DL (ref 65–99)
HCT VFR BLD AUTO: 23.7 % (ref 37.5–51)
HCT VFR BLD AUTO: 28.8 % (ref 37.5–51)
HCT VFR BLD AUTO: 33.1 % (ref 37.5–51)
HCT VFR BLD AUTO: 35.1 % (ref 37.5–51)
HGB BLD-MCNC: 10.4 G/DL (ref 13–17.7)
HGB BLD-MCNC: 11.1 G/DL (ref 13–17.7)
HGB BLD-MCNC: 7.6 G/DL (ref 13–17.7)
HGB BLD-MCNC: 8.7 G/DL (ref 13–17.7)
IMM GRANULOCYTES # BLD AUTO: 0.06 10*3/MM3 (ref 0–0.05)
IMM GRANULOCYTES NFR BLD AUTO: 0.5 % (ref 0–0.5)
LYMPHOCYTES # BLD AUTO: 0.72 10*3/MM3 (ref 0.7–3.1)
LYMPHOCYTES NFR BLD AUTO: 6.5 % (ref 19.6–45.3)
MCH RBC QN AUTO: 28 PG (ref 26.6–33)
MCHC RBC AUTO-ENTMCNC: 31.6 G/DL (ref 31.5–35.7)
MCV RBC AUTO: 88.4 FL (ref 79–97)
MONOCYTES # BLD AUTO: 1.08 10*3/MM3 (ref 0.1–0.9)
MONOCYTES NFR BLD AUTO: 9.8 % (ref 5–12)
NEUTROPHILS NFR BLD AUTO: 82.3 % (ref 42.7–76)
NEUTROPHILS NFR BLD AUTO: 9.08 10*3/MM3 (ref 1.7–7)
NRBC BLD AUTO-RTO: 0 /100 WBC (ref 0–0.2)
PLATELET # BLD AUTO: 203 10*3/MM3 (ref 140–450)
PMV BLD AUTO: 10.7 FL (ref 6–12)
POTASSIUM SERPL-SCNC: 4.8 MMOL/L (ref 3.5–5.2)
RBC # BLD AUTO: 3.97 10*6/MM3 (ref 4.14–5.8)
RH BLD: POSITIVE
RH BLD: POSITIVE
SODIUM SERPL-SCNC: 131 MMOL/L (ref 136–145)
T&S EXPIRATION DATE: NORMAL
UFH PPP CHRO-ACNC: 0.56 IU/ML (ref 0.3–0.7)
WBC # BLD AUTO: 11.04 10*3/MM3 (ref 3.4–10.8)

## 2021-03-03 PROCEDURE — 85025 COMPLETE CBC W/AUTO DIFF WBC: CPT | Performed by: INTERNAL MEDICINE

## 2021-03-03 PROCEDURE — 85520 HEPARIN ASSAY: CPT

## 2021-03-03 PROCEDURE — 86900 BLOOD TYPING SEROLOGIC ABO: CPT | Performed by: INTERNAL MEDICINE

## 2021-03-03 PROCEDURE — 93306 TTE W/DOPPLER COMPLETE: CPT | Performed by: INTERNAL MEDICINE

## 2021-03-03 PROCEDURE — 86901 BLOOD TYPING SEROLOGIC RH(D): CPT | Performed by: INTERNAL MEDICINE

## 2021-03-03 PROCEDURE — 93306 TTE W/DOPPLER COMPLETE: CPT

## 2021-03-03 PROCEDURE — P9041 ALBUMIN (HUMAN),5%, 50ML: HCPCS | Performed by: NURSE PRACTITIONER

## 2021-03-03 PROCEDURE — 99222 1ST HOSP IP/OBS MODERATE 55: CPT | Performed by: INTERNAL MEDICINE

## 2021-03-03 PROCEDURE — 80048 BASIC METABOLIC PNL TOTAL CA: CPT | Performed by: INTERNAL MEDICINE

## 2021-03-03 PROCEDURE — 86923 COMPATIBILITY TEST ELECTRIC: CPT

## 2021-03-03 PROCEDURE — 71275 CT ANGIOGRAPHY CHEST: CPT

## 2021-03-03 PROCEDURE — 0 IOPAMIDOL PER 1 ML: Performed by: HOSPITALIST

## 2021-03-03 PROCEDURE — 86901 BLOOD TYPING SEROLOGIC RH(D): CPT

## 2021-03-03 PROCEDURE — P9047 ALBUMIN (HUMAN), 25%, 50ML: HCPCS | Performed by: NURSE PRACTITIONER

## 2021-03-03 PROCEDURE — 88305 TISSUE EXAM BY PATHOLOGIST: CPT | Performed by: HOSPITALIST

## 2021-03-03 PROCEDURE — 63710000001 INSULIN LISPRO (HUMAN) PER 5 UNITS: Performed by: FAMILY MEDICINE

## 2021-03-03 PROCEDURE — 82962 GLUCOSE BLOOD TEST: CPT

## 2021-03-03 PROCEDURE — 25010000002 ALBUMIN HUMAN 5% PER 50 ML: Performed by: NURSE PRACTITIONER

## 2021-03-03 PROCEDURE — 25010000002 HEPARIN (PORCINE) 25000-0.45 UT/250ML-% SOLUTION

## 2021-03-03 PROCEDURE — 75989 ABSCESS DRAINAGE UNDER X-RAY: CPT

## 2021-03-03 PROCEDURE — 0W9B30Z DRAINAGE OF LEFT PLEURAL CAVITY WITH DRAINAGE DEVICE, PERCUTANEOUS APPROACH: ICD-10-PCS | Performed by: RADIOLOGY

## 2021-03-03 PROCEDURE — 85018 HEMOGLOBIN: CPT | Performed by: INTERNAL MEDICINE

## 2021-03-03 PROCEDURE — C1729 CATH, DRAINAGE: HCPCS

## 2021-03-03 PROCEDURE — 71045 X-RAY EXAM CHEST 1 VIEW: CPT

## 2021-03-03 PROCEDURE — 86850 RBC ANTIBODY SCREEN: CPT | Performed by: INTERNAL MEDICINE

## 2021-03-03 PROCEDURE — 88112 CYTOPATH CELL ENHANCE TECH: CPT | Performed by: HOSPITALIST

## 2021-03-03 PROCEDURE — 86900 BLOOD TYPING SEROLOGIC ABO: CPT

## 2021-03-03 PROCEDURE — 25010000002 ALBUMIN HUMAN 25% PER 50 ML: Performed by: NURSE PRACTITIONER

## 2021-03-03 PROCEDURE — 85014 HEMATOCRIT: CPT | Performed by: INTERNAL MEDICINE

## 2021-03-03 PROCEDURE — 99233 SBSQ HOSP IP/OBS HIGH 50: CPT | Performed by: HOSPITALIST

## 2021-03-03 RX ORDER — ALBUMIN (HUMAN) 12.5 G/50ML
50 SOLUTION INTRAVENOUS ONCE
Status: COMPLETED | OUTPATIENT
Start: 2021-03-03 | End: 2021-03-03

## 2021-03-03 RX ORDER — ALBUMIN, HUMAN INJ 5% 5 %
500 SOLUTION INTRAVENOUS ONCE
Status: COMPLETED | OUTPATIENT
Start: 2021-03-03 | End: 2021-03-03

## 2021-03-03 RX ORDER — NOREPINEPHRINE BIT/0.9 % NACL 8 MG/250ML
.02-.3 INFUSION BOTTLE (ML) INTRAVENOUS
Status: DISCONTINUED | OUTPATIENT
Start: 2021-03-03 | End: 2021-03-05

## 2021-03-03 RX ORDER — FENTANYL CITRATE 50 UG/ML
INJECTION, SOLUTION INTRAMUSCULAR; INTRAVENOUS
Status: DISPENSED
Start: 2021-03-03 | End: 2021-03-04

## 2021-03-03 RX ORDER — HEPARIN SODIUM 10000 [USP'U]/100ML
18 INJECTION, SOLUTION INTRAVENOUS
Status: DISCONTINUED | OUTPATIENT
Start: 2021-03-03 | End: 2021-03-03

## 2021-03-03 RX ORDER — FENTANYL CITRATE 50 UG/ML
50 INJECTION, SOLUTION INTRAMUSCULAR; INTRAVENOUS
Status: DISCONTINUED | OUTPATIENT
Start: 2021-03-03 | End: 2021-03-05

## 2021-03-03 RX ORDER — MIDAZOLAM HYDROCHLORIDE 1 MG/ML
2 INJECTION INTRAMUSCULAR; INTRAVENOUS
Status: DISCONTINUED | OUTPATIENT
Start: 2021-03-03 | End: 2021-03-03

## 2021-03-03 RX ORDER — MIDAZOLAM HYDROCHLORIDE 1 MG/ML
INJECTION INTRAMUSCULAR; INTRAVENOUS
Status: DISPENSED
Start: 2021-03-03 | End: 2021-03-04

## 2021-03-03 RX ADMIN — ENALAPRIL MALEATE 10 MG: 10 TABLET ORAL at 08:08

## 2021-03-03 RX ADMIN — INSULIN LISPRO 2 UNITS: 100 INJECTION, SOLUTION INTRAVENOUS; SUBCUTANEOUS at 08:08

## 2021-03-03 RX ADMIN — SODIUM CHLORIDE 500 ML: 9 INJECTION, SOLUTION INTRAVENOUS at 17:23

## 2021-03-03 RX ADMIN — HEPARIN SODIUM 18 UNITS/KG/HR: 10000 INJECTION, SOLUTION INTRAVENOUS at 04:05

## 2021-03-03 RX ADMIN — Medication 0.02 MCG/KG/MIN: at 22:11

## 2021-03-03 RX ADMIN — ALBUMIN HUMAN 50 G: 0.25 SOLUTION INTRAVENOUS at 20:48

## 2021-03-03 RX ADMIN — SODIUM CHLORIDE, PRESERVATIVE FREE 10 ML: 5 INJECTION INTRAVENOUS at 20:01

## 2021-03-03 RX ADMIN — IOPAMIDOL 95 ML: 755 INJECTION, SOLUTION INTRAVENOUS at 15:31

## 2021-03-03 RX ADMIN — ACETAMINOPHEN 650 MG: 325 TABLET ORAL at 11:42

## 2021-03-03 RX ADMIN — ASPIRIN 81 MG: 81 TABLET, COATED ORAL at 08:08

## 2021-03-03 RX ADMIN — SODIUM CHLORIDE, PRESERVATIVE FREE 10 ML: 5 INJECTION INTRAVENOUS at 08:09

## 2021-03-03 RX ADMIN — ALBUMIN HUMAN 500 ML: 0.05 INJECTION, SOLUTION INTRAVENOUS at 18:52

## 2021-03-03 NOTE — PROGRESS NOTES
INPATIENT PULMONARY SERVICE   HOSPITAL VISIT       Hospital:  LOS: 2 days      S     Mr. Danny Alvarez Jr. is a 75 y.o. male who was admitted with:      Pleural effusion    Portal vein thrombosis      HPI     6 months prior to admission. He noticed swelling in his ankles.   December 2020 He noticed some dyspnea while playing Tennis, but still able to do it.   January 2021 Dyspnea worse, after playing Tennis, and he has not played it since then.  Fatigue.  Chest tightness.  Non productive cough.  No hemoptysis.  Dyspnea getting worse as he tries to go from the 1st floor to the 2nd floor at his home.   3 weeks prior to admission He has a sudden onset of pain in his RUQ, followed by an explosive diarrhea. Pain eventually got better. No further episodes. This only lasted 1 day.   About 1 week prior to admission He was diagnosed with a tooth infection and started on antibiotics.   03/01/21 [facility] ED and admitted due to worsening dyspnea, and findings in the CT with a large left effusion   03/02/21 L thoracentesis per IR      PMH: He  has a past medical history of Diabetes mellitus (CMS/[facility]) and Hypertension.   PSxH: He  has a past surgical history that includes Clavicle surgery.     Medications:  No current facility-administered medications on file prior to encounter.      Current Outpatient Medications on File Prior to Encounter   Medication Sig   • enalapril (VASOTEC) 10 MG tablet Take 10 mg by mouth Daily.   • metFORMIN (GLUCOPHAGE) 500 MG tablet Take 500 mg by mouth 2 (Two) Times a Day With Meals.     Allergies: He has No Known Allergies.   FH: His family history is not on file.   SH: He  reports that he has never smoked. He has never used smokeless tobacco. He reports current alcohol use. Drug use questions deferred to the physician.     The patient's relevant past medical, surgical and social history were reviewed and updated in Epic as appropriate.      Review of Systems  As described in the HPI. GI: Nausea. Other  systems reviewed and negative.    Objective   O     Vitals:  Temp: 98 °F (36.7 °C) (03/03/21 0306) Temp  Min: 97.3 °F (36.3 °C)  Max: 98 °F (36.7 °C)   BP: (!) 85/58 (03/03/21 1614) BP  Min: 70/52  Max: 114/77   Pulse: 85 (03/03/21 1614) Pulse  Min: 85  Max: 104   Resp: 18 (03/03/21 1614) Resp  Min: 16  Max: 20   SpO2: 99 % (03/03/21 1614) SpO2  Min: 86 %  Max: 99 %   Device: nasal cannula (03/03/21 1554)    Flow Rate: 2 (03/03/21 1554) Flow (L/min)  Min: 2  Max: 2     Medications (drips):  heparin  hold  Pharmacy to Dose Heparin        Telemetry:  Rhythm: normal sinus rhythm (03/03/21 0800)         Constitutional:  No acute distress.   Cardiovascular: RRR.   Normal heart sounds.  No murmurs, gallop or rub.   Respiratory: Decreased breath sounds on the left.  No adventitious sounds.   Abdominal:  Soft with no tenderness.  No distension.   No HSM.   Extremities: Warm.  Dry.  No cyanosis.  Edema - ankles for about 6 months   Neurological:   Alert, Oriented, Cooperative.  Best Eye Response: 4-->(E4) spontaneous (03/02/21 2000)  Best Motor Response: 6-->(M6) obeys commands (03/02/21 2000)  Best Verbal Response: 5-->(V5) oriented (03/02/21 2000)  [place]o Coma Scale Score: 15 (03/02/21 2000)     Hematology:  Results from last 7 days   Lab Units 03/03/21  0330 03/02/21  0409 03/01/21  1726 03/01/21  1100   WBC 10*3/mm3 11.04* 8.23 7.24 10.75   HEMOGLOBIN g/dL 11.1* 12.9* 13.1 15.0   PLATELETS 10*3/mm3 203 167 173 240   NEUTROS ABS 10*3/mm3 9.08*  --  5.46 8.41*   LYMPHS ABS 10*3/mm3 0.72  --  0.97 1.17     Chemistry:  Results from last 7 days   Lab Units 03/03/21  0442 03/02/21  0409 03/01/21  1100   SODIUM mmol/L 131* 140 141   POTASSIUM mmol/L 4.8 4.1 4.2   CHLORIDE mmol/L 97* 104 101   CO2 mmol/L 24.0 28.0 28.0   BUN mg/dL 18 16 18   CREATININE mg/dL 0.74* 0.60* 0.72*   GLUCOSE mg/dL 201* 133* 172*     Results from last 7 days   Lab Units 03/03/21  0442 03/02/21  0409 03/01/21  1100   CALCIUM mg/dL 8.3* 8.5* 9.3      Results from last 7 days   Lab Units 03/02/21  0409 03/01/21  1100   AST (SGOT) U/L 23 25   ALT (SGPT) U/L 16 20   BILIRUBIN mg/dL 0.5 0.7   ALK PHOS U/L 137* 184*     Pleural Fluid:  Lab Results   Component Value Date    COLORFL Yellow 03/01/2021    APPEARFLUID Cloudy (A) 03/01/2021    WBCFLUID 1,057 03/01/2021    RBCFL 23,000 03/01/2021     Lab Results   Component Value Date    FNEUT 11 03/01/2021    LYMPHSFL 67 03/01/2021    FMONO 5 03/01/2021    MACROPHAGEFL 17 03/01/2021       Lab Results   Component Value Date    LDHFL 222 03/01/2021     (L) 03/01/2021     Lab Results   Component Value Date    TPBF 3.6 03/01/2021    PROTEINTOT 5.2 (L) 03/02/2021       Lab Results   Component Value Date    PHFL 7.36 03/01/2021     Bacteriology:  Lab Results   Component Value Date    BODYFLDCX No growth at 2 days 03/01/2021     Mycobacteria:  Lab Results   Component Value Date    AFBSTAIN No acid fast bacilli seen on concentrated smear 03/01/2021     COVID-19  Lab Results   Lab Value Date/Time    COVID19 Not Detected 03/01/2021 1236     Images:  Ct Abdomen Pelvis With Contrast    Result Date: 3/1/2021  1. No evidence of pulmonary embolus. 2. Very large left pleural effusion, and atelectasis of most of the left lung. 3. Large right pleural effusion with atelectasis of a moderate amount of right lower lobe. Minor nonspecific interstitial lung changes seen elsewhere.  ABDOMEN AND PELVIS CT SCAN WITH IV CONTRAST: There is mild-to- moderate ascites, adjacent the liver and spleen and in the upper paracolic gutters. There is a very small amount of intrapelvic free fluid. There is diffuse fatty liver change, and somewhat asymmetric enhancement pattern of the right and left liver lobes initially, but uniform and normal in appearance of the liver on the delayed series. As can be appreciated on images 25-30, there is dense opacification of the main portal vein, right portal vein, splenic vein with contrast, but no visible portal  vein contrast in the left portal vein, suggesting thrombosis and occlusion. There is perhaps mild narrowing of the distal main portal vein at the confluence of the right and left portal veins, axial image 29-series 8. Superior mesenteric vein is incompletely contrast opacified but this may be due to venous mixing, rather than thrombus.  The spleen is in the upper range of normal at 12.5 cm. There is upper abdomen fat stranding although this may represent edema. The appearance somewhat resembles omental metastatic disease, particularly in the right mid abdomen, images 46-49 of series 8. The bowel loops are normal in caliber. There is a large simple appearing right renal cyst. The kidneys otherwise appear unremarkable. The pancreas, gallbladder, and adrenal glands appear unremarkable.  Regarding the lower abdomen and pelvis, no mass or adenopathy is seen. There is a relatively small amount of pelvic ascites. Delayed venous phase images show contrast opacification of normal caliber renal collecting systems, ureters and bladder. The bony structures appear grossly intact.  IMPRESSION: 1. Mild-to-moderate ascites. 2. Apparently thrombosed left portal vein, with no visible portal vein contrast in the left lobe, and delayed parenchymal enhancement as a result. 3. Rather dense reticular disease of the right upper quadrant omental fat, possibly related to the patient's ascites but worrisome for omental metastatic disease. Similar milder changes of the omental fat elsewhere.  NOTE: Preliminary findings of this exam were discussed with the ordering provider, MARK Travis, at 2:20 PM 03/01/2021.  D:  03/01/2021 E:  03/01/2021  This report was finalized on 3/1/2021 10:03 PM by Dr. Michael Dennis MD.      Xr Chest 1 View    Result Date: 3/3/2021  Short interval significant increase in now large left-sided pleural effusion.  This report was finalized on 3/3/2021 10:23 AM by Asif Fletcher.      Xr Chest 1 View    Result Date:  3/1/2021  1. Opacification of most of the left hemithorax, apparently by pleural effusion. 2. Moderately extensive right basilar atelectasis and effusion. 3. Cardiomegaly without evidence of overt congestive failure.   D:  03/01/2021 E:  03/01/2021  This report was finalized on 3/1/2021 9:42 PM by Dr. Michael Dennis MD.      Ct Angiogram Chest    Result Date: 3/1/2021  1. No evidence of pulmonary embolus. 2. Very large left pleural effusion, and atelectasis of most of the left lung. 3. Large right pleural effusion with atelectasis of a moderate amount of right lower lobe. Minor nonspecific interstitial lung changes seen elsewhere.  ABDOMEN AND PELVIS CT SCAN WITH IV CONTRAST: There is mild-to- moderate ascites, adjacent the liver and spleen and in the upper paracolic gutters. There is a very small amount of intrapelvic free fluid. There is diffuse fatty liver change, and somewhat asymmetric enhancement pattern of the right and left liver lobes initially, but uniform and normal in appearance of the liver on the delayed series. As can be appreciated on images 25-30, there is dense opacification of the main portal vein, right portal vein, splenic vein with contrast, but no visible portal vein contrast in the left portal vein, suggesting thrombosis and occlusion. There is perhaps mild narrowing of the distal main portal vein at the confluence of the right and left portal veins, axial image 29-series 8. Superior mesenteric vein is incompletely contrast opacified but this may be due to venous mixing, rather than thrombus.  The spleen is in the upper range of normal at 12.5 cm. There is upper abdomen fat stranding although this may represent edema. The appearance somewhat resembles omental metastatic disease, particularly in the right mid abdomen, images 46-49 of series 8. The bowel loops are normal in caliber. There is a large simple appearing right renal cyst. The kidneys otherwise appear unremarkable. The pancreas,  gallbladder, and adrenal glands appear unremarkable.  Regarding the lower abdomen and pelvis, no mass or adenopathy is seen. There is a relatively small amount of pelvic ascites. Delayed venous phase images show contrast opacification of normal caliber renal collecting systems, ureters and bladder. The bony structures appear grossly intact.  IMPRESSION: 1. Mild-to-moderate ascites. 2. Apparently thrombosed left portal vein, with no visible portal vein contrast in the left lobe, and delayed parenchymal enhancement as a result. 3. Rather dense reticular disease of the right upper quadrant omental fat, possibly related to the patient's ascites but worrisome for omental metastatic disease. Similar milder changes of the omental fat elsewhere.  NOTE: Preliminary findings of this exam were discussed with the ordering provider, MARK Travis, at 2:20 PM 03/01/2021.  D:  03/01/2021 E:  03/01/2021  This report was finalized on 3/1/2021 10:03 PM by Dr. Michael Dennis MD.      Xr Chest Pa & Lateral    Result Date: 3/2/2021  Status post thoracentesis with decrease of left pleural effusion, however, persistent moderate-to-large left effusion remaining along with a small-to-moderate right pleural effusion additionally noted and adjacent opacifications.  D:  03/02/2021 E:  03/02/2021  This report was finalized on 3/2/2021 6:30 PM by Dr. Arturo Melendez.      Ct Guided Thoracentesis    Result Date: 3/1/2021   CT-guided thoracentesis with drainage of 2000 mL of dark yellow/cloudy fluid. Portion of fluid sent for requested laboratory analysis.  Plan:  Resume care by clinical team. _______________________________________________________________  PROCEDURE SUMMARY: - Limited thoracic CT - CT-guided thoracentesis - Additional procedure(s): None  PROCEDURE DETAILS:  Pre-procedure Consent: Informed consent for the procedure including risks, benefits and alternatives was obtained and time-out was performed prior to the procedure. Preparation:  The site was prepared and draped using maximal sterile barrier technique including cutaneous antisepsis.  Anesthesia/sedation Level of anesthesia/sedation: No sedation  Limited thoracic CT Limited thoracic CT was performed. A safe window for thoracentesis was identified. Left hemithorax findings: Large pleural fluid collection  Thoracentesis Local anesthesia was administered. The pleural space was accessed using trocar technique  and fluid return confirmed position. The fluid was drained. The catheter was removed, and a sterile bandage was applied. Catheter placed: 6.5 Citizen of [place] nonlocking pigtail Post-drainage hemithorax findings: Significant residual pleural fluid collection. No immediate complication  Radiation Dose CT dose length product (mGy-cm): 423  Additional Details Additional description of procedure: None Equipment details: None Specimens removed: Pleural fluid Estimated blood loss (mL): Less than 10 Standardized report: Thoracentesis  Attestation I was present and scrubbed for the entire procedure. Imaging reviewed. Agree with final report as written.   This report was finalized on 3/1/2021 4:21 PM by Raheem Graff.      Echo:       I reviewed the patient's new clinical results.  I independently reviewed the patient's new imaging results.    Assessment/Plan   A / P     03/01/21 03/02/21 03/03/21          Assessment:    Danny Alvarez Jr. is a 75 y.o. male admitted on 3/1/2021 via ED, due to increasing dyspnea and found to have a large left effusion along with portal vein thrombosis in his CT scans.    1. Bilateral pleural effusions. L >> R  1. S/p left thoracentesis 03/01/2021, 2 L dark yellow/cloudy output.  2. Exudate per Light's criteria  2. Fatty liver with trace of Ascites  3. Portal vein thrombosis, on anticoagulation with UFH  4. CT Abd with presence of dense reticular process of the RUQ omental fal, possibly related to ascites, as per CT Report.  5. Recent dental  infection  6. HTN  7. Weight loss 50 lb / ? time  8. T2DM    Results from last 7 days   Lab Units 03/03/21  1441 03/03/21  1118 03/03/21  0723 03/02/21  1934 03/02/21  1642 03/02/21  1146   GLUCOSE mg/dL 280* 244* 204* 180* 190* 184*     Lab Results   Lab Value Date/Time    HGBA1C 9.20 (H) 03/02/2021 0409        Advance Directives: Code Status and Medical Interventions:   Ordered at: 03/01/21 1710     Code Status:    CPR     Medical Interventions (Level of Support Prior to Arrest):    Full        Plan:    1. Cytology still pending. Special stains in progress.  2. Due to the large re-accumulation, proceed with placement of a L SBCT.  1. Discussed with Dr. Vazquez, who talked with Dr Graff, and plans to do procedure today, after he has been off UFH intravenous infusion for few hours.  2. Send more specimen for Cytology  3. F/U CXR daily while he has a SBCT      Plan of care and goals reviewed during interdisciplinary rounds.  I discussed the patient's findings and my recommendations with patient and primary care team    Level of Risk is High due to:  illness with threat to life or bodily function.     Time: 50 minutes, face to face, with the patient or on the alvarez coordinating care with other health care providers.     I spent > 50% percent of this time, counseling and discussing management.     Thank you very much for allowing to participate in the care of Mr. Danny Alvarez Jr.    Souleymane Thompson MD, FACP, FCCP, CNSC  Intensive Care Medicine, Nutrition Support and Pulmonary Medicine

## 2021-03-03 NOTE — NURSING NOTE
8.5 [place] pigtail catheter chest tube placed by Dr Graff.  CTA performed immediately after chest tube placement for dark blood from chest tube drainage.  Pt alert, oriented, calm.  To Holding Room awaiting diagnostic read of CTA.

## 2021-03-03 NOTE — PROGRESS NOTES
Continued Stay Note  [facility]     Patient Name: Danny Alvarez Jr.  MRN: [id number]  Today's Date: 3/3/2021    Admit Date: 3/1/2021    Discharge Plan     Row Name 03/03/21 1545       Plan    Plan  discharge plan    Plan Comments  Pt off the floor for placement of chest tube. CM will cont to follow for discharge needs.    Final Discharge Disposition Code  30 - still a patient        Discharge Codes    No documentation.       Expected Discharge Date and Time     Expected Discharge Date Expected Discharge Time    Mar 6, 2021             Laura Ventura RN

## 2021-03-03 NOTE — POST-PROCEDURE NOTE
Interventional Radiology Operative Note    Date: 03/03/21     Time: 18:29 EST     Pre-op Diagnosis: ER with SOA. Left >right pleural effusion - s/p left thoracentesis 03/01/2021, 2 L dark yellow/cloudy output c/w exudate per Light's criteria. Quick re-accumulation of left side pleural fluid collection.  Fatty liver with trace of ascites; Portal vein thrombosis, on anticoagulation with UFH  Weight loss 50 lb / ? time  Post-op Diagnosis: Same     Procedure: CT guided left side chest tube placement    Surgeon: YANI Graff M.D.  Assistants: None     Sedation: None     Estimated Blood Loss (EBL): Trace      Urine Output (UOP): N/A (short procedure)     IVF: N/A (short procedure)     Findings: Large left side pleural effusion     Specimens: 60 mL bloody fluid. Portion sent for requested laboratory analysis.     Complications: No immediate     Disposition: Back to room. Stable    Given the change in quality of the fluid from the thoracentesis 03/01/21 from dark/yellow/cloudy to bloody on today's chest tube placement, decision was made to perform CTA of the chest. Dr. Vazquez notified. There is evidence related to a previous bleed into the fluid (linear areas of increased density without evidence of active arterial contrast extravasation or increased enhancement on delayed phase imaging to suggest active hemorrhage. Previous bleed may be related to anticoagulation.

## 2021-03-03 NOTE — PROGRESS NOTES
[facility] Medicine Services  PROGRESS NOTE    Patient Name: Danny Alvarez Jr.  : 1945  MRN: [id number]    Date of Admission: 3/1/2021  Primary Care Physician: Jef Beckham MD    Subjective   Subjective     CC:  Follow-up pleural effusion    HPI:  Nausea and arm pain from insulin, no longer wants to take it. Notes orthopnea/PND last night. No sputum. Denies f/c. No vomiting. No diarrhea. Denies any bleeding in stool, ever.    Review of Systems   Constitutional: Positive for activity change and fatigue.   Respiratory: Positive for chest tightness and shortness of breath.         Orthopnea/pnd   Cardiovascular: Negative for chest pain.   Gastrointestinal: Positive for nausea.   Genitourinary: Negative.    Musculoskeletal: Positive for myalgias.   Skin: Negative.    Neurological: Negative.    Psychiatric/Behavioral: Negative.        Objective   Objective     Vital Signs:   Temp:  [97.3 °F (36.3 °C)-98 °F (36.7 °C)] 98 °F (36.7 °C)  Heart Rate:  [] 101  Resp:  [16-18] 18  BP: ()/(57-77) 114/77        Physical Exam:  NAD, alert and oriented x 3  OP clear, MMM  PERRL  Neck supple  No LAD  Decreased BS B bases  No c/c/e  No rashes  RRR  Normal affect  Constitutional: No acute distress, awake, alert, sitting up in bed  HENT: NCAT, mucous membranes moist  Respiratory: Diminished breath sounds bilateral bases, respiratory effort normal   Cardiovascular: RRR, no murmurs, rubs, or gallops, palpable radial pulse bilaterally  Gastrointestinal: Positive bowel sounds, soft, nontender, nondistended  Musculoskeletal: No bilateral ankle edema  Psychiatric: Appropriate affect, cooperative  Neurologic: Speech clear, moving all extremities equally    Results Reviewed:  Results from last 7 days   Lab Units 21  0330 21  0409 21  1726 21  1511 21  1100   WBC 10*3/mm3 11.04* 8.23 7.24  --  10.75   HEMOGLOBIN g/dL 11.1* 12.9* 13.1  --  15.0   HEMATOCRIT % 35.1*  40.8 42.6  --  47.6   PLATELETS 10*3/mm3 203 167 173  --  240   INR   --   --  1.14 1.2  --    PROCALCITONIN ng/mL  --   --   --   --  0.04     Results from last 7 days   Lab Units 03/03/21  0442 03/02/21  0813 03/02/21  0409 03/01/21  1726  03/01/21  1100   SODIUM mmol/L 131*  --  140  --   --  141   POTASSIUM mmol/L 4.8  --  4.1  --   --  4.2   CHLORIDE mmol/L 97*  --  104  --   --  101   CO2 mmol/L 24.0  --  28.0  --   --  28.0   BUN mg/dL 18  --  16  --   --  18   CREATININE mg/dL 0.74*  --  0.60*  --   --  0.72*   GLUCOSE mg/dL 201*  --  133*  --   --  172*   CALCIUM mg/dL 8.3*  --  8.5*  --   --  9.3   ALT (SGPT) U/L  --   --  16  --   --  20   AST (SGOT) U/L  --   --  23  --   --  25   TROPONIN T ng/mL  --  0.121* 0.142* 0.057*   < > 0.031*   PROBNP pg/mL  --   --   --   --   --  809.8    < > = values in this interval not displayed.     Estimated Creatinine Clearance: 84.4 mL/min (A) (by C-G formula based on SCr of 0.74 mg/dL (L)).    Microbiology Results Abnormal     Procedure Component Value - Date/Time    Body Fluid Culture - Body Fluid, Pleural Cavity [[id number]] Collected: 03/01/21 1626    Lab Status: Preliminary result Specimen: Body Fluid from Pleural Cavity Updated: 03/03/21 0838     Body Fluid Culture No growth at 2 days     Gram Stain Moderate (3+) WBCs per low power field      No organisms seen    Blood Culture - Blood, Arm, Left [[id number]] Collected: 03/01/21 1230    Lab Status: Preliminary result Specimen: Blood from Arm, Left Updated: 03/02/21 1401     Blood Culture No growth at 24 hours    Blood Culture - Blood, Arm, Left [[id number]] Collected: 03/01/21 1234    Lab Status: Preliminary result Specimen: Blood from Arm, Left Updated: 03/02/21 1401     Blood Culture No growth at 24 hours    AFB Culture - Body Fluid, Pleural Cavity [[id number]] Collected: 03/01/21 1626    Lab Status: Preliminary result Specimen: Body Fluid from Pleural Cavity Updated: 03/02/21 1224     AFB Stain No acid fast bacilli  seen on concentrated smear    COVID PRE-OP / PRE-PROCEDURE SCREENING ORDER (NO ISOLATION) - Swab, Nasopharynx [[id number]]  (Normal) Collected: 03/01/21 1236    Lab Status: Final result Specimen: Swab from Nasopharynx Updated: 03/01/21 1341    Narrative:      The following orders were created for panel order COVID PRE-OP / PRE-PROCEDURE SCREENING ORDER (NO ISOLATION) - Swab, Nasopharynx.  Procedure                               Abnormality         Status                     ---------                               -----------         ------                     COVID-19 and FLU A/B PCR...[[id number]]  Normal              Final result                 Please view results for these tests on the individual orders.    COVID-19 and FLU A/B PCR - Swab, Nasopharynx [[id number]]  (Normal) Collected: 03/01/21 1236    Lab Status: Final result Specimen: Swab from Nasopharynx Updated: 03/01/21 1341     COVID19 Not Detected     Influenza A PCR Not Detected     Influenza B PCR Not Detected    Narrative:      Fact sheet for providers: https://www.fda.gov/media/700105/download    Fact sheet for patients: https://www.fda.gov/media/976580/download    Test performed by PCR.          Imaging Results (Last 24 Hours)     Procedure Component Value Units Date/Time    XR Chest PA & Lateral [[id number]] Collected: 03/02/21 1338     Updated: 03/02/21 1834    Narrative:      EXAMINATION: XR CHEST, PA AND LATERAL-03/02/2021:      INDICATION: Port thoracentesis, concern for malignant effusion, eval for  revealed mass; N13-Rjviexh effusion, not elsewhere classified;  R93.5-Abnormal findings on diagnostic imaging of other abdominal  regions, including retroperitoneum; R06.00-Dyspnea, unspecified;  R77.8-Other specified abnormalities of plasma proteins.      COMPARISON: Chest x-ray 03/01/2021.     FINDINGS: Cardiac size enlarged and obscured from persistent  moderate-to- large volume left partially layering pleural effusion  although decreased from prior  comparison status post thoracentesis.  Right basilar opacifications of small-to- moderate pleural effusion with  adjacent atelectasis versus airspace disease.       Impression:      Status post thoracentesis with decrease of left pleural  effusion, however, persistent moderate-to-large left effusion remaining  along with a small-to-moderate right pleural effusion additionally noted  and adjacent opacifications.     D:  03/02/2021  E:  03/02/2021     This report was finalized on 3/2/2021 6:30 PM by Dr. Arturo Melendez.                  I have reviewed the medications:  Scheduled Meds:aspirin, 324 mg, Oral, Once  aspirin, 81 mg, Oral, Daily  enalapril, 10 mg, Oral, Daily  lidocaine PF 1%, 20 mL, Injection, Once  linagliptin, 5 mg, Oral, Daily  sodium chloride, 10 mL, Intravenous, Q12H      Continuous Infusions:heparin, 18 Units/kg/hr, Last Rate: 18 Units/kg/hr (03/03/21 0405)  Pharmacy to Dose Heparin,       PRN Meds:.•  acetaminophen **OR** acetaminophen **OR** acetaminophen  •  bisacodyl  •  bisacodyl  •  calcium carbonate  •  dextrose  •  dextrose  •  glucagon (human recombinant)  •  magnesium sulfate **OR** magnesium sulfate **OR** magnesium sulfate  •  melatonin  •  ondansetron **OR** ondansetron  •  Pharmacy to Dose Heparin  •  potassium chloride **OR** potassium chloride **OR** potassium chloride  •  senna-docusate sodium  •  sodium chloride  •  sodium chloride    Assessment/Plan   Assessment & Plan     Active Hospital Problems    Diagnosis  POA   • **Pleural effusion on left [J90]  Yes   • Elevated troponin [R77.8]  Yes   • Diabetes mellitus (CMS/HCC) [E11.9]  Yes   • Ascites [R18.8]  Yes   • Portal vein thrombosis [I81]  Yes      Resolved Hospital Problems   No resolved problems to display.        Brief Hospital Course to date:  Danny Alvarez Jr. is a 75 y.o. male with HTN, diabetes who presented with several months of progressive dyspnea/orthopnea/dyspnea on exertion, rapid progression over recent weeks, 50 pound  "weight loss; with a large LT pleural effusion and moderate RT pleural effusion s/p thoracentesis and additional finding of portal venous thrombosis    The following problems are new to me today    Assessment/plan    Acute hypoxic respiratory insufficiency  Bilateral exudative pleural effusions LT>RT  --s/p 2L thoracentesis from L  --recurrence of symptoms, will repeat CXR and likely needs repeat thoracentesis  --if fluid seems to be rapidly accumulating, needs CTS/pulmonary consult regarding next steps for definitive management  --cytology pending  --cultures pending  --exudate  --ECHO pending    NSTEMI, type 2  -No chest pain, troponin relatively flat    Portal venous thrombosis  Moderate volume ascites  --on IV heparin, can transition to NOAC pending further procedures  --IV heparin for now  --GI consulted, reviewed recommendations      DM type 2, A1c 9.2%, without long-term use of insulin  -Refusing insulin, low dose tradjenta for now    Recent dental infection  -Patient reports \"infected root canal\"  -Received x 6 days antibiotic therapy PTA, data deficient    S/p COVID-19 vaccination  -Second dose on 2/27/2021    DVT Prophylaxis: Heparin drip    Disposition: I expect the patient to be discharged pending further evaluation/management of the patient's effusions.    CODE STATUS:   Code Status and Medical Interventions:   Ordered at: 03/01/21 3350     Code Status:    CPR     Medical Interventions (Level of Support Prior to Arrest):    Full       Michael Vazquez MD  03/03/21              "

## 2021-03-04 ENCOUNTER — APPOINTMENT (OUTPATIENT)
Dept: GENERAL RADIOLOGY | Facility: HOSPITAL | Age: 76
End: 2021-03-04

## 2021-03-04 ENCOUNTER — APPOINTMENT (OUTPATIENT)
Dept: CARDIOLOGY | Facility: HOSPITAL | Age: 76
End: 2021-03-04

## 2021-03-04 PROBLEM — I82.451 ACUTE DEEP VEIN THROMBOSIS (DVT) OF RIGHT PERONEAL VEIN (HCC): Status: ACTIVE | Noted: 2021-03-04

## 2021-03-04 PROBLEM — E11.9 TYPE 2 DIABETES MELLITUS WITHOUT COMPLICATION (HCC): Status: ACTIVE | Noted: 2020-12-18

## 2021-03-04 PROBLEM — I50.43 ACUTE ON CHRONIC COMBINED SYSTOLIC AND DIASTOLIC CHF (CONGESTIVE HEART FAILURE) (HCC): Status: ACTIVE | Noted: 2021-03-04

## 2021-03-04 PROBLEM — G47.33 OBSTRUCTIVE SLEEP APNEA SYNDROME: Status: ACTIVE | Noted: 2020-07-01

## 2021-03-04 PROBLEM — I10 ESSENTIAL HYPERTENSION: Status: ACTIVE | Noted: 2020-07-01

## 2021-03-04 LAB
ALBUMIN SERPL-MCNC: 3.7 G/DL (ref 3.5–5.2)
ALBUMIN/GLOB SERPL: 2.1 G/DL
ALP SERPL-CCNC: 84 U/L (ref 39–117)
ALT SERPL W P-5'-P-CCNC: 13 U/L (ref 1–41)
ANION GAP SERPL CALCULATED.3IONS-SCNC: 9 MMOL/L (ref 5–15)
ASCENDING AORTA: 3.6 CM
AST SERPL-CCNC: 65 U/L (ref 1–40)
BACTERIA FLD CULT: NORMAL
BASOPHILS # BLD AUTO: 0.03 10*3/MM3 (ref 0–0.2)
BASOPHILS NFR BLD AUTO: 0.2 % (ref 0–1.5)
BH CV ECHO MEAS - AO MAX PG (FULL): 8.5 MMHG
BH CV ECHO MEAS - AO MAX PG: 10.1 MMHG
BH CV ECHO MEAS - AO MEAN PG (FULL): 5 MMHG
BH CV ECHO MEAS - AO MEAN PG: 6 MMHG
BH CV ECHO MEAS - AO ROOT AREA (BSA CORRECTED): 2
BH CV ECHO MEAS - AO ROOT AREA: 11.1 CM^2
BH CV ECHO MEAS - AO ROOT DIAM: 3.8 CM
BH CV ECHO MEAS - AO V2 MAX: 158.5 CM/SEC
BH CV ECHO MEAS - AO V2 MEAN: 117.3 CM/SEC
BH CV ECHO MEAS - AO V2 VTI: 30.1 CM
BH CV ECHO MEAS - ASC AORTA: 3.6 CM
BH CV ECHO MEAS - AVA(I,A): 1.5 CM^2
BH CV ECHO MEAS - AVA(I,D): 1.5 CM^2
BH CV ECHO MEAS - AVA(V,A): 1.5 CM^2
BH CV ECHO MEAS - AVA(V,D): 1.5 CM^2
BH CV ECHO MEAS - BSA(HAYCOCK): 1.9 M^2
BH CV ECHO MEAS - BSA(HAYCOCK): 1.9 M^2
BH CV ECHO MEAS - BSA: 1.9 M^2
BH CV ECHO MEAS - BSA: 1.9 M^2
BH CV ECHO MEAS - BZI_BMI: 23.5 KILOGRAMS/M^2
BH CV ECHO MEAS - BZI_BMI: 23.5 KILOGRAMS/M^2
BH CV ECHO MEAS - BZI_METRIC_HEIGHT: 177.8 CM
BH CV ECHO MEAS - BZI_METRIC_HEIGHT: 177.8 CM
BH CV ECHO MEAS - BZI_METRIC_WEIGHT: 74.4 KG
BH CV ECHO MEAS - BZI_METRIC_WEIGHT: 74.4 KG
BH CV ECHO MEAS - EDV(CUBED): 212.8 ML
BH CV ECHO MEAS - EDV(MOD-SP4): 123 ML
BH CV ECHO MEAS - EDV(TEICH): 178 ML
BH CV ECHO MEAS - EF(CUBED): 48.9 %
BH CV ECHO MEAS - EF(MOD-BP): 40 %
BH CV ECHO MEAS - EF(MOD-SP4): 39.8 %
BH CV ECHO MEAS - EF(TEICH): 40.4 %
BH CV ECHO MEAS - ESV(CUBED): 108.7 ML
BH CV ECHO MEAS - ESV(MOD-SP4): 74 ML
BH CV ECHO MEAS - ESV(TEICH): 106.1 ML
BH CV ECHO MEAS - FS: 20.1 %
BH CV ECHO MEAS - IVS/LVPW: 1
BH CV ECHO MEAS - IVSD: 1.3 CM
BH CV ECHO MEAS - LAD MAJOR: 5.3 CM
BH CV ECHO MEAS - LAT PEAK E' VEL: 5.3 CM/SEC
BH CV ECHO MEAS - LATERAL E/E' RATIO: 18.1
BH CV ECHO MEAS - LV DIASTOLIC VOL/BSA (35-75): 64.1 ML/M^2
BH CV ECHO MEAS - LV IVRT: 0.07 SEC
BH CV ECHO MEAS - LV MASS(C)D: 328.3 GRAMS
BH CV ECHO MEAS - LV MASS(C)DI: 171.1 GRAMS/M^2
BH CV ECHO MEAS - LV MAX PG: 1.6 MMHG
BH CV ECHO MEAS - LV MEAN PG: 0.97 MMHG
BH CV ECHO MEAS - LV SYSTOLIC VOL/BSA (12-30): 38.6 ML/M^2
BH CV ECHO MEAS - LV V1 MAX: 63.2 CM/SEC
BH CV ECHO MEAS - LV V1 MEAN: 46.4 CM/SEC
BH CV ECHO MEAS - LV V1 VTI: 11.8 CM
BH CV ECHO MEAS - LVIDD: 6 CM
BH CV ECHO MEAS - LVIDS: 4.8 CM
BH CV ECHO MEAS - LVLD AP4: 8.6 CM
BH CV ECHO MEAS - LVLS AP4: 7.6 CM
BH CV ECHO MEAS - LVOT AREA (M): 3.8 CM^2
BH CV ECHO MEAS - LVOT AREA: 3.7 CM^2
BH CV ECHO MEAS - LVOT DIAM: 2.2 CM
BH CV ECHO MEAS - LVPWD: 1.2 CM
BH CV ECHO MEAS - MED PEAK E' VEL: 4.6 CM/SEC
BH CV ECHO MEAS - MEDIAL E/E' RATIO: 20.7
BH CV ECHO MEAS - MR MAX PG: 69 MMHG
BH CV ECHO MEAS - MR MAX VEL: 413.4 CM/SEC
BH CV ECHO MEAS - MR MEAN PG: 45.1 MMHG
BH CV ECHO MEAS - MR MEAN VEL: 308.3 CM/SEC
BH CV ECHO MEAS - MR VTI: 109.1 CM
BH CV ECHO MEAS - MV A MAX VEL: 59.2 CM/SEC
BH CV ECHO MEAS - MV DEC SLOPE: 389.6 CM/SEC^2
BH CV ECHO MEAS - MV DEC TIME: 0.1 SEC
BH CV ECHO MEAS - MV E MAX VEL: 98.2 CM/SEC
BH CV ECHO MEAS - MV E/A: 1.7
BH CV ECHO MEAS - MV MAX PG: 4.3 MMHG
BH CV ECHO MEAS - MV MEAN PG: 1.7 MMHG
BH CV ECHO MEAS - MV P1/2T MAX VEL: 114.3 CM/SEC
BH CV ECHO MEAS - MV P1/2T: 85.9 MSEC
BH CV ECHO MEAS - MV V2 MAX: 103.2 CM/SEC
BH CV ECHO MEAS - MV V2 MEAN: 66 CM/SEC
BH CV ECHO MEAS - MV V2 VTI: 25.8 CM
BH CV ECHO MEAS - MVA P1/2T LCG: 1.9 CM^2
BH CV ECHO MEAS - MVA(P1/2T): 2.6 CM^2
BH CV ECHO MEAS - MVA(VTI): 1.7 CM^2
BH CV ECHO MEAS - PA ACC SLOPE: 287.7 CM/SEC^2
BH CV ECHO MEAS - PA ACC TIME: 0.13 SEC
BH CV ECHO MEAS - PA PR(ACCEL): 18.8 MMHG
BH CV ECHO MEAS - RAP SYSTOLE: 15 MMHG
BH CV ECHO MEAS - RVDD: 2.9 CM
BH CV ECHO MEAS - RVSP: 58.6 MMHG
BH CV ECHO MEAS - SI(AO): 173.6 ML/M^2
BH CV ECHO MEAS - SI(CUBED): 54.2 ML/M^2
BH CV ECHO MEAS - SI(LVOT): 23 ML/M^2
BH CV ECHO MEAS - SI(MOD-SP4): 25.5 ML/M^2
BH CV ECHO MEAS - SI(TEICH): 37.4 ML/M^2
BH CV ECHO MEAS - SV(AO): 333 ML
BH CV ECHO MEAS - SV(CUBED): 104.1 ML
BH CV ECHO MEAS - SV(LVOT): 44.2 ML
BH CV ECHO MEAS - SV(MOD-SP4): 49 ML
BH CV ECHO MEAS - SV(TEICH): 71.8 ML
BH CV ECHO MEAS - TR MAX PG: 43.6 MMHG
BH CV ECHO MEAS - TR MAX VEL: 330.3 CM/SEC
BH CV ECHO MEASUREMENTS AVERAGE E/E' RATIO: 19.84
BH CV LOW VAS LEFT LESSER SAPH VESSEL: 1
BH CV LOW VAS RIGHT LESSER SAPH VESSEL: 1
BH CV LOW VAS RIGHT PERONEAL VESSEL: 1
BH CV LOWER VASCULAR LEFT COMMON FEMORAL AUGMENT: NORMAL
BH CV LOWER VASCULAR LEFT COMMON FEMORAL COMPRESS: NORMAL
BH CV LOWER VASCULAR LEFT COMMON FEMORAL PHASIC: NORMAL
BH CV LOWER VASCULAR LEFT COMMON FEMORAL SPONT: NORMAL
BH CV LOWER VASCULAR LEFT DISTAL FEMORAL AUGMENT: NORMAL
BH CV LOWER VASCULAR LEFT DISTAL FEMORAL COMPRESS: NORMAL
BH CV LOWER VASCULAR LEFT DISTAL FEMORAL PHASIC: NORMAL
BH CV LOWER VASCULAR LEFT DISTAL FEMORAL SPONT: NORMAL
BH CV LOWER VASCULAR LEFT GASTRONEMIUS COMPRESS: NORMAL
BH CV LOWER VASCULAR LEFT GREATER SAPH AK COMPRESS: NORMAL
BH CV LOWER VASCULAR LEFT GREATER SAPH BK COMPRESS: NORMAL
BH CV LOWER VASCULAR LEFT LESSER SAPH COMPRESS: NORMAL
BH CV LOWER VASCULAR LEFT MID FEMORAL AUGMENT: NORMAL
BH CV LOWER VASCULAR LEFT MID FEMORAL COMPRESS: NORMAL
BH CV LOWER VASCULAR LEFT MID FEMORAL PHASIC: NORMAL
BH CV LOWER VASCULAR LEFT MID FEMORAL SPONT: NORMAL
BH CV LOWER VASCULAR LEFT PERONEAL COMPRESS: NORMAL
BH CV LOWER VASCULAR LEFT POPLITEAL AUGMENT: NORMAL
BH CV LOWER VASCULAR LEFT POPLITEAL COMPRESS: NORMAL
BH CV LOWER VASCULAR LEFT POPLITEAL PHASIC: NORMAL
BH CV LOWER VASCULAR LEFT POPLITEAL SPONT: NORMAL
BH CV LOWER VASCULAR LEFT POSTERIOR TIBIAL COMPRESS: NORMAL
BH CV LOWER VASCULAR LEFT PROFUNDA FEMORAL AUGMENT: NORMAL
BH CV LOWER VASCULAR LEFT PROFUNDA FEMORAL COMPRESS: NORMAL
BH CV LOWER VASCULAR LEFT PROFUNDA FEMORAL SPONT: NORMAL
BH CV LOWER VASCULAR LEFT PROXIMAL FEMORAL AUGMENT: NORMAL
BH CV LOWER VASCULAR LEFT PROXIMAL FEMORAL COMPRESS: NORMAL
BH CV LOWER VASCULAR LEFT PROXIMAL FEMORAL PHASIC: NORMAL
BH CV LOWER VASCULAR LEFT PROXIMAL FEMORAL SPONT: NORMAL
BH CV LOWER VASCULAR LEFT SAPHENOFEMORAL JUNCTION AUGMENT: NORMAL
BH CV LOWER VASCULAR LEFT SAPHENOFEMORAL JUNCTION COMPRESS: NORMAL
BH CV LOWER VASCULAR LEFT SAPHENOFEMORAL JUNCTION PHASIC: NORMAL
BH CV LOWER VASCULAR LEFT SAPHENOFEMORAL JUNCTION SPONT: NORMAL
BH CV LOWER VASCULAR RIGHT COMMON FEMORAL AUGMENT: NORMAL
BH CV LOWER VASCULAR RIGHT COMMON FEMORAL COMPRESS: NORMAL
BH CV LOWER VASCULAR RIGHT COMMON FEMORAL PHASIC: NORMAL
BH CV LOWER VASCULAR RIGHT COMMON FEMORAL SPONT: NORMAL
BH CV LOWER VASCULAR RIGHT DISTAL FEMORAL AUGMENT: NORMAL
BH CV LOWER VASCULAR RIGHT DISTAL FEMORAL COMPRESS: NORMAL
BH CV LOWER VASCULAR RIGHT DISTAL FEMORAL PHASIC: NORMAL
BH CV LOWER VASCULAR RIGHT DISTAL FEMORAL SPONT: NORMAL
BH CV LOWER VASCULAR RIGHT GASTRONEMIUS COMPRESS: NORMAL
BH CV LOWER VASCULAR RIGHT GREATER SAPH AK COMPRESS: NORMAL
BH CV LOWER VASCULAR RIGHT GREATER SAPH BK COMPRESS: NORMAL
BH CV LOWER VASCULAR RIGHT LESSER SAPH COMPRESS: NORMAL
BH CV LOWER VASCULAR RIGHT MID FEMORAL AUGMENT: NORMAL
BH CV LOWER VASCULAR RIGHT MID FEMORAL COMPRESS: NORMAL
BH CV LOWER VASCULAR RIGHT MID FEMORAL PHASIC: NORMAL
BH CV LOWER VASCULAR RIGHT MID FEMORAL SPONT: NORMAL
BH CV LOWER VASCULAR RIGHT PERONEAL COMPRESS: NORMAL
BH CV LOWER VASCULAR RIGHT POPLITEAL AUGMENT: NORMAL
BH CV LOWER VASCULAR RIGHT POPLITEAL COMPRESS: NORMAL
BH CV LOWER VASCULAR RIGHT POPLITEAL PHASIC: NORMAL
BH CV LOWER VASCULAR RIGHT POPLITEAL SPONT: NORMAL
BH CV LOWER VASCULAR RIGHT POSTERIOR TIBIAL COMPRESS: NORMAL
BH CV LOWER VASCULAR RIGHT PROFUNDA FEMORAL AUGMENT: NORMAL
BH CV LOWER VASCULAR RIGHT PROFUNDA FEMORAL COMPRESS: NORMAL
BH CV LOWER VASCULAR RIGHT PROFUNDA FEMORAL PHASIC: NORMAL
BH CV LOWER VASCULAR RIGHT PROXIMAL FEMORAL COMPRESS: NORMAL
BH CV LOWER VASCULAR RIGHT PROXIMAL FEMORAL PHASIC: NORMAL
BH CV LOWER VASCULAR RIGHT PROXIMAL FEMORAL SPONT: NORMAL
BH CV LOWER VASCULAR RIGHT SAPHENOFEMORAL JUNCTION AUGMENT: NORMAL
BH CV LOWER VASCULAR RIGHT SAPHENOFEMORAL JUNCTION COMPRESS: NORMAL
BH CV LOWER VASCULAR RIGHT SAPHENOFEMORAL JUNCTION PHASIC: NORMAL
BH CV LOWER VASCULAR RIGHT SAPHENOFEMORAL JUNCTION SPONT: NORMAL
BH CV VAS BP RIGHT ARM: NORMAL MMHG
BH CV XLRA - RV BASE: 3.6 CM
BH CV XLRA - RV LENGTH: 7.4 CM
BH CV XLRA - RV MID: 3.6 CM
BILIRUB SERPL-MCNC: 0.8 MG/DL (ref 0–1.2)
BUN SERPL-MCNC: 21 MG/DL (ref 8–23)
BUN/CREAT SERPL: 31.8 (ref 7–25)
CALCIUM SPEC-SCNC: 9.2 MG/DL (ref 8.6–10.5)
CHLORIDE SERPL-SCNC: 98 MMOL/L (ref 98–107)
CO2 SERPL-SCNC: 28 MMOL/L (ref 22–29)
CREAT SERPL-MCNC: 0.66 MG/DL (ref 0.76–1.27)
DEPRECATED RDW RBC AUTO: 42.3 FL (ref 37–54)
EOSINOPHIL # BLD AUTO: 0.02 10*3/MM3 (ref 0–0.4)
EOSINOPHIL NFR BLD AUTO: 0.2 % (ref 0.3–6.2)
ERYTHROCYTE [DISTWIDTH] IN BLOOD BY AUTOMATED COUNT: 13.2 % (ref 12.3–15.4)
GFR SERPL CREATININE-BSD FRML MDRD: 118 ML/MIN/1.73
GLOBULIN UR ELPH-MCNC: 1.8 GM/DL
GLUCOSE BLDC GLUCOMTR-MCNC: 183 MG/DL (ref 70–130)
GLUCOSE BLDC GLUCOMTR-MCNC: 184 MG/DL (ref 70–130)
GLUCOSE BLDC GLUCOMTR-MCNC: 197 MG/DL (ref 70–130)
GLUCOSE BLDC GLUCOMTR-MCNC: 213 MG/DL (ref 70–130)
GLUCOSE SERPL-MCNC: 207 MG/DL (ref 65–99)
GRAM STN SPEC: NORMAL
GRAM STN SPEC: NORMAL
HCT VFR BLD AUTO: 29.6 % (ref 37.5–51)
HGB BLD-MCNC: 9.5 G/DL (ref 13–17.7)
IMM GRANULOCYTES # BLD AUTO: 0.05 10*3/MM3 (ref 0–0.05)
IMM GRANULOCYTES NFR BLD AUTO: 0.4 % (ref 0–0.5)
LEFT ATRIUM VOLUME INDEX: 27 ML/M2
LEFT ATRIUM VOLUME: 52 CM3
LYMPHOCYTES # BLD AUTO: 1.28 10*3/MM3 (ref 0.7–3.1)
LYMPHOCYTES NFR BLD AUTO: 9.8 % (ref 19.6–45.3)
MAGNESIUM SERPL-MCNC: 1.6 MG/DL (ref 1.6–2.4)
MCH RBC QN AUTO: 28.4 PG (ref 26.6–33)
MCHC RBC AUTO-ENTMCNC: 32.1 G/DL (ref 31.5–35.7)
MCV RBC AUTO: 88.4 FL (ref 79–97)
MONOCYTES # BLD AUTO: 1.46 10*3/MM3 (ref 0.1–0.9)
MONOCYTES NFR BLD AUTO: 11.2 % (ref 5–12)
NEUTROPHILS NFR BLD AUTO: 10.17 10*3/MM3 (ref 1.7–7)
NEUTROPHILS NFR BLD AUTO: 78.2 % (ref 42.7–76)
NRBC BLD AUTO-RTO: 0 /100 WBC (ref 0–0.2)
PHOSPHATE SERPL-MCNC: 2.5 MG/DL (ref 2.5–4.5)
PLATELET # BLD AUTO: 194 10*3/MM3 (ref 140–450)
PMV BLD AUTO: 10.9 FL (ref 6–12)
POTASSIUM SERPL-SCNC: 4.2 MMOL/L (ref 3.5–5.2)
PROT SERPL-MCNC: 5.5 G/DL (ref 6–8.5)
QT INTERVAL: 342 MS
QTC INTERVAL: 429 MS
RBC # BLD AUTO: 3.35 10*6/MM3 (ref 4.14–5.8)
SODIUM SERPL-SCNC: 135 MMOL/L (ref 136–145)
UFH PPP CHRO-ACNC: 0.28 IU/ML (ref 0.3–0.7)
WBC # BLD AUTO: 13.01 10*3/MM3 (ref 3.4–10.8)

## 2021-03-04 PROCEDURE — 85520 HEPARIN ASSAY: CPT

## 2021-03-04 PROCEDURE — 93970 EXTREMITY STUDY: CPT

## 2021-03-04 PROCEDURE — 25010000002 ONDANSETRON PER 1 MG: Performed by: INTERNAL MEDICINE

## 2021-03-04 PROCEDURE — 25010000003 MAGNESIUM SULFATE 4 GM/100ML SOLUTION: Performed by: INTERNAL MEDICINE

## 2021-03-04 PROCEDURE — 25010000002 HEPARIN (PORCINE) 25000-0.45 UT/250ML-% SOLUTION

## 2021-03-04 PROCEDURE — 80053 COMPREHEN METABOLIC PANEL: CPT | Performed by: INTERNAL MEDICINE

## 2021-03-04 PROCEDURE — 85014 HEMATOCRIT: CPT | Performed by: INTERNAL MEDICINE

## 2021-03-04 PROCEDURE — 85025 COMPLETE CBC W/AUTO DIFF WBC: CPT | Performed by: INTERNAL MEDICINE

## 2021-03-04 PROCEDURE — 36430 TRANSFUSION BLD/BLD COMPNT: CPT

## 2021-03-04 PROCEDURE — 83735 ASSAY OF MAGNESIUM: CPT | Performed by: INTERNAL MEDICINE

## 2021-03-04 PROCEDURE — 71045 X-RAY EXAM CHEST 1 VIEW: CPT

## 2021-03-04 PROCEDURE — P9016 RBC LEUKOCYTES REDUCED: HCPCS

## 2021-03-04 PROCEDURE — 85018 HEMOGLOBIN: CPT | Performed by: INTERNAL MEDICINE

## 2021-03-04 PROCEDURE — 86900 BLOOD TYPING SEROLOGIC ABO: CPT

## 2021-03-04 PROCEDURE — 93970 EXTREMITY STUDY: CPT | Performed by: INTERNAL MEDICINE

## 2021-03-04 PROCEDURE — 99233 SBSQ HOSP IP/OBS HIGH 50: CPT | Performed by: INTERNAL MEDICINE

## 2021-03-04 PROCEDURE — 82962 GLUCOSE BLOOD TEST: CPT

## 2021-03-04 PROCEDURE — 84100 ASSAY OF PHOSPHORUS: CPT | Performed by: INTERNAL MEDICINE

## 2021-03-04 RX ORDER — HEPARIN SODIUM 10000 [USP'U]/100ML
24 INJECTION, SOLUTION INTRAVENOUS
Status: DISCONTINUED | OUTPATIENT
Start: 2021-03-04 | End: 2021-03-09

## 2021-03-04 RX ORDER — HEPARIN SODIUM 1000 [USP'U]/ML
18 INJECTION, SOLUTION INTRAVENOUS; SUBCUTANEOUS AS NEEDED
Status: DISCONTINUED | OUTPATIENT
Start: 2021-03-04 | End: 2021-03-04

## 2021-03-04 RX ADMIN — ONDANSETRON 4 MG: 2 INJECTION INTRAMUSCULAR; INTRAVENOUS at 15:58

## 2021-03-04 RX ADMIN — SODIUM CHLORIDE, PRESERVATIVE FREE 10 ML: 5 INJECTION INTRAVENOUS at 20:12

## 2021-03-04 RX ADMIN — MAGNESIUM SULFATE HEPTAHYDRATE 4 G: 40 INJECTION, SOLUTION INTRAVENOUS at 11:29

## 2021-03-04 RX ADMIN — ANTACID TABLETS 2 TABLET: 500 TABLET, CHEWABLE ORAL at 05:41

## 2021-03-04 RX ADMIN — HEPARIN SODIUM 18 UNITS/KG/HR: 10000 INJECTION, SOLUTION INTRAVENOUS at 11:29

## 2021-03-04 NOTE — PROGRESS NOTES
"INTENSIVIST NOTE     Hospital Day: 3    Mr. Danny Alvarez Jr., 75 y.o. male is followed for:   Pleural effusions and hypotension       SUBJECTIVE     75-year-old male admitted on 3/1 with increasing shortness of breath.  He also had significant recent 50 pound weight loss.  Further work-up revealed large bilateral pleural effusions left greater than right.  CT of the abdomen and pelvis revealed a thrombosed portal vein and reticular abnormalities in the right upper quadrant omental fat with concerns of omental metastases.  Echocardiogram revealed a decreased ejection fraction in the range of 41-45%.  There was diastolic dysfunction as well and mild LVH.  He underwent left thoracentesis and subsequent pigtail chest tube placement.  He was transferred to the ICU on the evening of 3/3 after chest tube placement due to concerns over hypotension.    Interval history:    He has had a total of 2.8 L of chest tube output.  Initially this was serous but it is more serosanguineous at the current time.  Fluid balance overall -2 L.  On minimal doses of norepinephrine.  Awake, alert, and oriented and up in bedside chair.    The patient's relevant past medical, surgical and social history were reviewed and updated in Epic as appropriate.       OBJECTIVE     Vital Sign Min/Max for last 24 hours  Temp  Min: 97.5 °F (36.4 °C)  Max: 98.1 °F (36.7 °C)   BP  Min: 69/48  Max: 116/85   Pulse  Min: 81  Max: 122   Resp  Min: 16  Max: 22   SpO2  Min: 89 %  Max: 99 %   No data recorded   Weight  Min: 74.4 kg (164 lb)  Max: 74.4 kg (164 lb)      Intake/Output Summary (Last 24 hours) at 3/4/2021 1635  Last data filed at 3/4/2021 1400  Gross per 24 hour   Intake 1702.48 ml   Output 2440 ml   Net -737.52 ml      Flowsheet Rows      First Filed Value   Admission Height  177.8 cm (70\") Documented at 03/01/2021 1027   Admission Weight  74.8 kg (165 lb) Documented at 03/01/2021 1027             03/01/21  1641 03/01/21  1736 03/03/21  1938   Weight: " "74.8 kg (165 lb) 74.8 kg (164 lb 12.8 oz) 74.4 kg (164 lb)            Objective:  General Appearance:  In no acute distress.    Vital signs: (most recent): Blood pressure 95/64, pulse 110, temperature 97.5 °F (36.4 °C), temperature source Axillary, resp. rate 18, height 177.8 cm (70\"), weight 74.4 kg (164 lb), SpO2 95 %.    HEENT: Normal HEENT exam.  (Nasal cannula O2)    Lungs:  Normal effort and normal respiratory rate.  He is not in respiratory distress.  There are decreased breath sounds.  No rales, wheezes or rhonchi.    Heart: Tachycardia.  Regular rhythm.  S1 normal and S2 normal.  No murmur, gallop or friction rub.   Chest: Symmetric chest wall expansion. (Left pigtail chest tube)  Abdomen: Abdomen is soft and non-distended.  Bowel sounds are normal.   There is no abdominal tenderness.   There is no mass. There is no splenomegaly. There is no hepatomegaly.   Extremities: There is dependent edema.  There is no deformity.    Neurological: Patient is alert and oriented to person, place and time.    Pupils:  Pupils are equal, round, and reactive to light.    Skin:  Warm and dry.              I reviewed the patient's new clinical results.  I reviewed the patient's new imaging results/reports including actual images and agree with reports.      Chest X-Ray: Bilateral pleural effusions and perihilar infiltrates.  Left chest tube well-positioned.    INFUSIONS  heparin, 18 Units/kg/hr, Last Rate: 18 Units/kg/hr (03/04/21 1129)  norepinephrine, 0.02-0.3 mcg/kg/min, Last Rate: Stopped (03/04/21 1215)  Pharmacy to Dose Heparin,         Results from last 7 days   Lab Units 03/04/21  0658 03/03/21  2342 03/03/21  1958  03/03/21  0330 03/02/21  0409   WBC 10*3/mm3 13.01*  --   --   --  11.04* 8.23   HEMOGLOBIN g/dL 9.5* 7.6* 8.7*   < > 11.1* 12.9*   HEMATOCRIT % 29.6* 23.7* 28.8*   < > 35.1* 40.8   PLATELETS 10*3/mm3 194  --   --   --  203 167    < > = values in this interval not displayed.     Results from last 7 days "   Lab Units 03/04/21  0658 03/03/21  0442 03/02/21  0409   SODIUM mmol/L 135* 131* 140   POTASSIUM mmol/L 4.2 4.8 4.1   CHLORIDE mmol/L 98 97* 104   CO2 mmol/L 28.0 24.0 28.0   BUN mg/dL 21 18 16   GLUCOSE mg/dL 207* 201* 133*   CREATININE mg/dL 0.66* 0.74* 0.60*   MAGNESIUM mg/dL 1.6  --   --    CALCIUM mg/dL 9.2 8.3* 8.5*                 [facility] Ventilation:      I reviewed the patient's medications.    Assessment/Plan   ASSESSMENT/PLAN     Active Hospital Problems    Diagnosis   • **Pleural effusion on left   • Acute on chronic combined systolic and diastolic CHF (congestive heart failure) (CMS/HCC)   • Portal vein thrombosis   • Elevated troponin   • Ascites   • Type 2 diabetes mellitus without complication (CMS/HCC)   • Essential hypertension       1. Bilateral pleural effusions left greater than right: Status post left pigtail chest tube with serosanguineous drainage.  Pleural fluid is exudative.  No purulence and cultures negative so far.  Cytology pending.  Suspect malignancy.  2. Portal vein thrombosis: Ominous finding particularly in light of the fact that there is changes in the omentum which could be consistent with metastatic disease.  On a heparin drip.  3. Chronic and possibly acute systolic and diastolic CHF: Contribution to his pleural effusions is unclear.    1. Wean off low-dose pressors  2. Await pleural fluid cytology  3. Tradjenta for blood sugar control as patient has refused insulin  4. Resume heparin drip for full vein thrombosis.  Monitor for any increased bleeding from pleural space.  5. If pleural fluid cytology negative will need to consider further diagnostic measures as I suspect underlying malignancy.     I discussed the patient's findings and my recommendations with patient and nursing staff     Plan of care and goals reviewed with multidisciplinary team at daily rounds.    High level of risk due to:  illness with threat to life or bodily function.    Josemanuel Liao,  MD  Pulmonary and Critical Care Medicine  03/04/21 16:35 EST

## 2021-03-04 NOTE — PROGRESS NOTES
Clinical Nutrition     Reason for Visit: MDR, Identified at risk by screening criteria    Patient Name: Danny Alvarez Jr.  YOB: 1945  MRN: [id number]  Date of Encounter: 03/04/21 15:09 EST  Admission date: 3/1/2021    Nutrition Assessment   Admission Problem List:  ARF  Pleural effusions  Portal vein thrombosis  Fatty liver  Ascites  RUQ omental fat stranding  Elevated troponin    s/p L. thoracentesis 3-1-21    s/p CT guided L. chest tube placement 3-3-21    h/o recent dental infection  PMH:   He  has a past medical history of Diabetes mellitus (CMS/HCC) and Hypertension.  PSxH:  He  has a past surgical history that includes Clavicle surgery.      Reported/Observed/Food/Nutrition Related History:     Pt sitting up in chair, reports poor appetite for past 3-4 weeks, states his UBW is between 165-172lb's, he has gradually lost weight ~ 50lb's over past 5 years as he is now retired, he attributes wt loss to staying very active, playing golf etc    Pt states that 6 months ago he was told he has diabetes and started metformin, then had fungal  Infection of toe and was placed on abx, most recently has had dental infection, needs a root canal,  and was placed on abx,  pt notes that his metformin dose was increased as his blood sugars have been in the 300's, pt states that he has not felt well since metformin dose increased  Also has had unusual GI habits, will not have bm for a week and then have diarrhea    Anthropometrics   Height: 70in  Weight: 164lb  BMI: 23.5  BMI classification: Normal: 18.5-24.9kg/m2     UBW: 165-172lb    Weight change: 50lb over past 5 years attributed to increased physical activity            Last 15 Recorded Weights  View Complete Flowsheet  Weight Weight (kg) Weight (lbs) Weight Method   3/3/2021 74.39 kg 164 lb -   3/1/2021 74.753 kg 164 lb 12.8 oz Standing scale   3/1/2021 74.844 kg 165 lb Stated   3/1/2021 74.844 kg 165 lb Stated         Labs reviewed     Results  from last 7 days   Lab Units 03/04/21  0658   SODIUM mmol/L 135*   POTASSIUM mmol/L 4.2   CHLORIDE mmol/L 98   CO2 mmol/L 28.0   BUN mg/dL 21   CREATININE mg/dL 0.66*   CALCIUM mg/dL 9.2   BILIRUBIN mg/dL 0.8   ALK PHOS U/L 84   ALT (SGPT) U/L 13   AST (SGOT) U/L 65*   GLUCOSE mg/dL 207*       Results from last 7 days   Lab Units 03/04/21  1105 03/04/21  0708 03/03/21  2023 03/03/21  1649 03/03/21  1441 03/03/21  1118   GLUCOSE mg/dL 213* 184* 179* 247* 280* 244*     Lab Results   Lab Value Date/Time    HGBA1C 9.20 (H) 03/02/2021 0409       Medications reviewed   Pertinent:heparin, levophed    Intake/Ouptut 24 hrs (7:00AM - 6:59 AM)     Intake & Output (last day)       03/03 0701 - 03/04 0700 03/04 0701 - 03/05 0700    P.O. 320     I.V. (mL/kg) 1395.9 (18.8)     Blood 306.6     Total Intake(mL/kg) 2022.5 (27.2)     Urine (mL/kg/hr) 500 (0.3)     Stool 0     Chest Tube 2750 550    Total Output 3250 550    Net -1227.5 -550          Urine Unmeasured Occurrence 2 x     Stool Unmeasured Occurrence 1 x                GI: wnl    SKIN: stage 1 coccyx    Current Nutrition Prescription     PO: Diet Regular; Cardiac, Consistent Carbohydrate    Intake: 25% of 1 meals    Nutrition Diagnosis   Date: 3-4-21  Problem Predicted suboptimal energy intake   Etiology Per Clinical Status   Signs/Symptoms Poor appetite, ascites, ? intentional wt loss     Nutrition Intervention   1.  Follow treatment progress, Advised available snacks, Interview for preferences, Menu provided, Menu adjusted, Supplement provided    Boost GC 3x/day    Goal:   General: Nutrition support treatment  PO: Establish PO    Additional goals: f/u later for DM Education when appropriate    Monitoring/Evaluation:   Per protocol, I&O, PO intake, Pertinent labs, Weight, Skin status, GI status, Symptoms, Hemodynamic stability  Francheska Acevedo RD  Time Spent: 45min

## 2021-03-04 NOTE — NURSING NOTE
Pt returned from intervention w/chest tube containing 1300ml dark red blood @1715. By 1730 chest tube was full w/ added 700 ml. Systolic BP 80's - 100. MD contacted. Pt transferred to unit.

## 2021-03-04 NOTE — SIGNIFICANT NOTE
I was called and informed that 5:45 PM of the detection of a right peroneal vein thrombus.  The patient is currently on a heparin drip.    Fermín Servin MD

## 2021-03-04 NOTE — PLAN OF CARE
Goal Outcome Evaluation:  Plan of Care Reviewed With: patient  Progress: no change  Outcome Summary: Pt transferred from 5G, 500ml NS infusing on arrival, SBP 100s, after completion SBP 70-80s, 5% 500ml albumin x 1 administered, H/H 10.4/33.1, Left CT output 350ml sangiounoues drainage in an hour, H/H q4h, Niece updated.

## 2021-03-04 NOTE — PROGRESS NOTES
HEPARIN INFUSION  Danny Alvarez Jr. is a  75 y.o. male receiving heparin infusion.     Therapy for (VTE/Cardiac):   VTE, portal vein thrombosis  Patient Weight: 74.4 kg  Initial Bolus (Y/N):   Yes  Any Bolus (Y/N):   Yes   Signs or Symptoms of Bleeding: Y, patient does have red tinge liquid from chest tube    VTE (PE/DVT)   Initial Bolus: 80 units/kg (Max 10,000 units)  Initial rate: 18 units/kg/hr (Max 1,500 units/hr)      (Anti-Xa) (IU/mL) Bolus Dose Stop Infusion Rate Change Repeat (Anti-Xa)     ? 0.19 80 units/kg 0 hrs Increase rate by   4 units/kg/hr 6 hrs      0.2 - 0.29 40 units/kg 0 hrs Increase rate by 2 units/kg/hr 6 hrs    0.3 - 0.7  0 0 hrs No change 6 hrs      0.71 - 0.99   0  0 hrs Decrease rate by 2 units/kg/hr 6 hrs      ? 1 0 Hold 1 hr Decrease rate by 3 units/kg/hr 6 hrs      Results from last 7 days   Lab Units 03/04/21  0658 03/03/21  2342 03/03/21  1958  03/03/21  0330 03/02/21  0409 03/01/21  1726 03/01/21  1511   INR   --   --   --   --   --   --  1.14 1.2   HEMOGLOBIN g/dL 9.5* 7.6* 8.7*   < > 11.1* 12.9* 13.1  --    HEMATOCRIT % 29.6* 23.7* 28.8*   < > 35.1* 40.8 42.6  --    PLATELETS 10*3/mm3 194  --   --   --  203 167 173  --     < > = values in this interval not displayed.          Date   Time   Anti-Xa Current Rate (Unit/kg/hr) Bolus   (Units) Rate Change   (Unit/kg/hr) New Rate (Unit/kg/hr) Next   Anti-Xa Comments  Pump Check Daily   3/1 1743 0.10 -- 6000 +18 18 0000 DW RN   3/1 2329 0.59 18 -- -- 18 0600 Discussed w/ nurse   3/2 0409 0.5 18 -- -- 18 1200 Dw BERKLEY Sweeney   3/2 1250 0.48 18 -- -- 18 2000 Dw BERKLEY Sweeney   3/2 2030 0.42 18 -- -- 18 AM Lab Discussed w/ nurse   3/3 0330 0.56 18 -- -- 18 AM Lab  3/4/21 Discussed w/ nurse   3/3 1524  HOLD     Held since 3/3 for thoracentesis and bleeding after   3/4 1107 -- -- -- +18 18 1800 DW BERKLEY Tolbert                                                                                                                                                     Ese Calero, PharmD  Pharmacy Resident  3/4/2021  11:16 EST

## 2021-03-04 NOTE — PLAN OF CARE
Problem: Adult Inpatient Plan of Care  Goal: Plan of Care Review  Flowsheets (Taken 3/4/2021 6775)  Progress: improving  Plan of Care Reviewed With: patient  Outcome Summary: On arrival to shift, SBP 70s-80s. 50g 25% albumin given with no response and H/H dropped; APRN notified of changes and levophed initiated. Highest dose levophed used 0.08, weaning off. MN H/H decreased to 7.6/23.7, 1 Unit PRBC administered, H/H recheck ordered. CT output 590 dark red fluid. Pt utilizing 1-2L NC at times. No complaints of pain. . Afebrile.

## 2021-03-05 ENCOUNTER — APPOINTMENT (OUTPATIENT)
Dept: GENERAL RADIOLOGY | Facility: HOSPITAL | Age: 76
End: 2021-03-05

## 2021-03-05 LAB
ALBUMIN SERPL-MCNC: 3.2 G/DL (ref 3.5–5.2)
ANION GAP SERPL CALCULATED.3IONS-SCNC: 3 MMOL/L (ref 5–15)
BASOPHILS # BLD AUTO: 0.04 10*3/MM3 (ref 0–0.2)
BASOPHILS NFR BLD AUTO: 0.4 % (ref 0–1.5)
BH BB BLOOD EXPIRATION DATE: NORMAL
BH BB BLOOD TYPE BARCODE: 5100
BH BB DISPENSE STATUS: NORMAL
BH BB PRODUCT CODE: NORMAL
BH BB UNIT NUMBER: NORMAL
BUN SERPL-MCNC: 18 MG/DL (ref 8–23)
BUN/CREAT SERPL: 30.5 (ref 7–25)
CALCIUM SPEC-SCNC: 8.6 MG/DL (ref 8.6–10.5)
CANCER AG19-9 SERPL-ACNC: 736.6 U/ML
CEA SERPL-MCNC: 0.75 NG/ML
CHLORIDE SERPL-SCNC: 101 MMOL/L (ref 98–107)
CO2 SERPL-SCNC: 32 MMOL/L (ref 22–29)
CREAT SERPL-MCNC: 0.59 MG/DL (ref 0.76–1.27)
CROSSMATCH INTERPRETATION: NORMAL
DEPRECATED RDW RBC AUTO: 41.1 FL (ref 37–54)
EOSINOPHIL # BLD AUTO: 0.07 10*3/MM3 (ref 0–0.4)
EOSINOPHIL NFR BLD AUTO: 0.6 % (ref 0.3–6.2)
ERYTHROCYTE [DISTWIDTH] IN BLOOD BY AUTOMATED COUNT: 13.2 % (ref 12.3–15.4)
GFR SERPL CREATININE-BSD FRML MDRD: 134 ML/MIN/1.73
GLUCOSE BLDC GLUCOMTR-MCNC: 158 MG/DL (ref 70–130)
GLUCOSE BLDC GLUCOMTR-MCNC: 169 MG/DL (ref 70–130)
GLUCOSE BLDC GLUCOMTR-MCNC: 189 MG/DL (ref 70–130)
GLUCOSE BLDC GLUCOMTR-MCNC: 201 MG/DL (ref 70–130)
GLUCOSE SERPL-MCNC: 154 MG/DL (ref 65–99)
HCT VFR BLD AUTO: 28.9 % (ref 37.5–51)
HCT VFR BLD AUTO: 29 % (ref 37.5–51)
HCT VFR BLD AUTO: 30.3 % (ref 37.5–51)
HCT VFR BLD AUTO: 31.5 % (ref 37.5–51)
HCT VFR BLD AUTO: 33.4 % (ref 37.5–51)
HGB BLD-MCNC: 10.2 G/DL (ref 13–17.7)
HGB BLD-MCNC: 10.6 G/DL (ref 13–17.7)
HGB BLD-MCNC: 9.3 G/DL (ref 13–17.7)
HGB BLD-MCNC: 9.4 G/DL (ref 13–17.7)
HGB BLD-MCNC: 9.7 G/DL (ref 13–17.7)
IMM GRANULOCYTES # BLD AUTO: 0.04 10*3/MM3 (ref 0–0.05)
IMM GRANULOCYTES NFR BLD AUTO: 0.4 % (ref 0–0.5)
LAB AP CASE REPORT: NORMAL
LYMPHOCYTES # BLD AUTO: 1.34 10*3/MM3 (ref 0.7–3.1)
LYMPHOCYTES NFR BLD AUTO: 12.2 % (ref 19.6–45.3)
MAGNESIUM SERPL-MCNC: 2 MG/DL (ref 1.6–2.4)
MCH RBC QN AUTO: 28.3 PG (ref 26.6–33)
MCHC RBC AUTO-ENTMCNC: 32.4 G/DL (ref 31.5–35.7)
MCV RBC AUTO: 87.3 FL (ref 79–97)
MONOCYTES # BLD AUTO: 1.12 10*3/MM3 (ref 0.1–0.9)
MONOCYTES NFR BLD AUTO: 10.2 % (ref 5–12)
NEUTROPHILS NFR BLD AUTO: 76.2 % (ref 42.7–76)
NEUTROPHILS NFR BLD AUTO: 8.35 10*3/MM3 (ref 1.7–7)
NRBC BLD AUTO-RTO: 0 /100 WBC (ref 0–0.2)
PATH REPORT.FINAL DX SPEC: NORMAL
PHOSPHATE SERPL-MCNC: 2.2 MG/DL (ref 2.5–4.5)
PHOSPHATE SERPL-MCNC: 2.3 MG/DL (ref 2.5–4.5)
PLATELET # BLD AUTO: 163 10*3/MM3 (ref 140–450)
PMV BLD AUTO: 11.4 FL (ref 6–12)
POTASSIUM SERPL-SCNC: 4.7 MMOL/L (ref 3.5–5.2)
RBC # BLD AUTO: 3.32 10*6/MM3 (ref 4.14–5.8)
SODIUM SERPL-SCNC: 136 MMOL/L (ref 136–145)
UFH PPP CHRO-ACNC: 0.28 IU/ML (ref 0.3–0.7)
UFH PPP CHRO-ACNC: 0.38 IU/ML (ref 0.3–0.7)
UFH PPP CHRO-ACNC: 0.41 IU/ML (ref 0.3–0.7)
UFH PPP CHRO-ACNC: 0.43 IU/ML (ref 0.3–0.7)
UNIT  ABO: NORMAL
UNIT  RH: NORMAL
WBC # BLD AUTO: 10.96 10*3/MM3 (ref 3.4–10.8)

## 2021-03-05 PROCEDURE — 85025 COMPLETE CBC W/AUTO DIFF WBC: CPT | Performed by: INTERNAL MEDICINE

## 2021-03-05 PROCEDURE — 82378 CARCINOEMBRYONIC ANTIGEN: CPT | Performed by: INTERNAL MEDICINE

## 2021-03-05 PROCEDURE — 84100 ASSAY OF PHOSPHORUS: CPT | Performed by: NURSE PRACTITIONER

## 2021-03-05 PROCEDURE — 25010000002 ALBUMIN HUMAN 5% PER 50 ML: Performed by: NURSE PRACTITIONER

## 2021-03-05 PROCEDURE — 85014 HEMATOCRIT: CPT | Performed by: INTERNAL MEDICINE

## 2021-03-05 PROCEDURE — 25010000002 ONDANSETRON PER 1 MG: Performed by: INTERNAL MEDICINE

## 2021-03-05 PROCEDURE — 25010000002 HEPARIN (PORCINE) 25000-0.45 UT/250ML-% SOLUTION

## 2021-03-05 PROCEDURE — 99222 1ST HOSP IP/OBS MODERATE 55: CPT | Performed by: INTERNAL MEDICINE

## 2021-03-05 PROCEDURE — 99233 SBSQ HOSP IP/OBS HIGH 50: CPT | Performed by: INTERNAL MEDICINE

## 2021-03-05 PROCEDURE — 71045 X-RAY EXAM CHEST 1 VIEW: CPT

## 2021-03-05 PROCEDURE — 85520 HEPARIN ASSAY: CPT

## 2021-03-05 PROCEDURE — 83735 ASSAY OF MAGNESIUM: CPT | Performed by: INTERNAL MEDICINE

## 2021-03-05 PROCEDURE — 85018 HEMOGLOBIN: CPT | Performed by: INTERNAL MEDICINE

## 2021-03-05 PROCEDURE — 82962 GLUCOSE BLOOD TEST: CPT

## 2021-03-05 PROCEDURE — P9041 ALBUMIN (HUMAN),5%, 50ML: HCPCS | Performed by: NURSE PRACTITIONER

## 2021-03-05 PROCEDURE — 86301 IMMUNOASSAY TUMOR CA 19-9: CPT | Performed by: INTERNAL MEDICINE

## 2021-03-05 PROCEDURE — 80069 RENAL FUNCTION PANEL: CPT | Performed by: INTERNAL MEDICINE

## 2021-03-05 RX ORDER — NOREPINEPHRINE BIT/0.9 % NACL 8 MG/250ML
.02-.3 INFUSION BOTTLE (ML) INTRAVENOUS
Status: DISCONTINUED | OUTPATIENT
Start: 2021-03-06 | End: 2021-03-07

## 2021-03-05 RX ORDER — MIDODRINE HYDROCHLORIDE 5 MG/1
5 TABLET ORAL EVERY 8 HOURS SCHEDULED
Status: DISCONTINUED | OUTPATIENT
Start: 2021-03-05 | End: 2021-03-09

## 2021-03-05 RX ORDER — ALBUMIN, HUMAN INJ 5% 5 %
500 SOLUTION INTRAVENOUS ONCE
Status: COMPLETED | OUTPATIENT
Start: 2021-03-05 | End: 2021-03-05

## 2021-03-05 RX ADMIN — ONDANSETRON 4 MG: 2 INJECTION INTRAMUSCULAR; INTRAVENOUS at 12:40

## 2021-03-05 RX ADMIN — MIDODRINE HYDROCHLORIDE 5 MG: 5 TABLET ORAL at 22:06

## 2021-03-05 RX ADMIN — ALBUMIN HUMAN 500 ML: 0.05 INJECTION, SOLUTION INTRAVENOUS at 22:06

## 2021-03-05 RX ADMIN — ENALAPRIL MALEATE 10 MG: 10 TABLET ORAL at 08:44

## 2021-03-05 RX ADMIN — POTASSIUM & SODIUM PHOSPHATES POWDER PACK 280-160-250 MG 2 PACKET: 280-160-250 PACK at 05:50

## 2021-03-05 RX ADMIN — HEPARIN SODIUM 20 UNITS/KG/HR: 10000 INJECTION, SOLUTION INTRAVENOUS at 04:54

## 2021-03-05 RX ADMIN — LINAGLIPTIN 5 MG: 5 TABLET, FILM COATED ORAL at 08:44

## 2021-03-05 RX ADMIN — SODIUM CHLORIDE, PRESERVATIVE FREE 10 ML: 5 INJECTION INTRAVENOUS at 20:24

## 2021-03-05 RX ADMIN — HEPARIN SODIUM 21 UNITS/KG/HR: 10000 INJECTION, SOLUTION INTRAVENOUS at 20:24

## 2021-03-05 RX ADMIN — Medication 0.02 MCG/KG/MIN: at 23:24

## 2021-03-05 NOTE — CONSULTS
Subjective     PROBLEM LIST:  Patient Active Problem List   Diagnosis   • Pleural effusion on left   • Elevated troponin   • Ascites   • Portal vein thrombosis   • Essential hypertension   • Obstructive sleep apnea syndrome   • Type 2 diabetes mellitus without complication (CMS/HCC)   • Acute on chronic combined systolic and diastolic CHF (congestive heart failure) (CMS/HCC)   • Acute deep vein thrombosis (DVT) of right peroneal vein (CMS/HCC)         CHIEF COMPLAINT: adenocarcinoma    HISTORY OF PRESENT ILLNESS:  The patient is a 75 y.o. male, referred  for recently diagnosed adenocarcinoma.      He was admitted on 3/1 with 50 lb weight loss, shortness of breath.  He was found to have bilateral pleural effusions, protal vein thrombosis, omental abnormalities concerning for metastases.  Thoracentesis was performed with left chest tube placement.  Subsequently a peroneal vein thrombosis was noted.  Per report the pleural fluid cytology is showing adenocarcinoma.    He says that the weight loss occurred because he retired and stopped eating so much fast food and changed his lifestyle.    He had not seen a doctor for the past 15 yrs, but recently established care with Dr. Beckham.  He has not had a screening colonoscopy.  He reports that over the past 6 months bowel movements have become more difficult, to the point that he was only having a bowel movement about every 6 weeks.  He has had a lot of trouble with nausea and which he attributes to metformin that was recently prescribed.  He has since stopped the metformin but nausea continues.     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  He lives with his wife in [facility] and they spend the ru[place] in [place].  He is a never smoker.  His sister was a smoker who had lung cancer.    REVIEW OF SYSTEMS:  A 14 point review of systems was performed and is negative except as noted above.    Past Medical History:   Diagnosis Date   • Diabetes mellitus (CMS/HCC)    •  Hypertension        No current facility-administered medications on file prior to encounter.      Current Outpatient Medications on File Prior to Encounter   Medication Sig Dispense Refill   • enalapril (VASOTEC) 10 MG tablet Take 10 mg by mouth Daily.     • metFORMIN (GLUCOPHAGE) 500 MG tablet Take 500 mg by mouth 2 (Two) Times a Day With Meals.         No Known Allergies    Past Surgical History:   Procedure Laterality Date   • CLAVICLE SURGERY             Social History     Socioeconomic History   • Marital status:      Spouse name: Not on file   • Number of children: Not on file   • Years of education: Not on file   • Highest education level: Not on file   Tobacco Use   • Smoking status: Never Smoker   • Smokeless tobacco: Never Used   Substance and Sexual Activity   • Alcohol use: Yes     Comment: 1 glass of wine per week   • Drug use: Defer   • Sexual activity: Defer       History reviewed. No pertinent family history.    Objective     Vitals:    03/05/21 0800 03/05/21 0900 03/05/21 1000 03/05/21 1100   BP: 95/70 105/72 (!) 124/104 (!) 85/61   BP Location:       Patient Position:       Pulse: 100 100 96 93   Resp: 18 20 20 18   Temp: 97.7 °F (36.5 °C)      TempSrc: Oral      SpO2: 93% 95% 93% 93%   Weight:       Height:                       Performance Status: 1  General: cachectic appearing male in no acute distress  Neuro: alert and oriented  HEENT: sclerae anicteric, oropharynx clear  Lymphatics: no cervical, supraclavicular, or axillary adenopathy  Cardiovascular: regular rate and rhythm, no murmurs  Lungs: decreased bilateral bases  Abdomen: soft, nontender, nondistended.  No palpable organomegaly  Extremities: no lower extremity edema  Skin: no rashes, lesions, bruising, or petechiae  Psych: mood and affect appropriate    Lab Results   Component Value Date    WBC 10.96 (H) 03/05/2021    HGB 10.6 (L) 03/05/2021    HCT 33.4 (L) 03/05/2021    MCV 87.3 03/05/2021     03/05/2021     Lab Results    Component Value Date    GLUCOSE 154 (H) 03/05/2021    BUN 18 03/05/2021    CREATININE 0.59 (L) 03/05/2021    EGFRIFNONA 134 03/05/2021    BCR 30.5 (H) 03/05/2021    K 4.7 03/05/2021    CO2 32.0 (H) 03/05/2021    CALCIUM 8.6 03/05/2021    ALBUMIN 3.20 (L) 03/05/2021    AST 65 (H) 03/04/2021    ALT 13 03/04/2021       I personally reviewed the CT images of c/a/p.  Results as noted above.      Assessment/Plan     Danny Alvarez Jr. is a 75 y.o. male with reported metastatic malignancy with bilateral large effusion, possible omental disease, portal vein thrombosis and lower extremity DVT.    Metastatic adenocarcinoma: primary unclear at this time.  Await further information from pathology stains.  Given weight loss, nausea, change in bowel pattern, suspicious of GI primary.  Consider GI consult for evaluation if path consistent with this. Will check CEA and CA 19-9.    Bilateral pleural effusions: depending on rate of continued drainage, may benefit from pleurx catheter in the short term.    Portal vein thrombosis/DVT: secondary to metastatic malignancy.  Will need indefinite anticoagulation.     DM: no current tx.    Will follow.           Anupama Desouza MD    3/5/2021

## 2021-03-05 NOTE — PLAN OF CARE
Problem: Adult Inpatient Plan of Care  Goal: Plan of Care Review  Flowsheets  Taken 3/5/2021 0744  Progress: improving  Outcome Summary: BP marginal but MAP has remained greater than 65 with levophed off. Last H/H stable at 9.3/28.9. CT output 350mls dark red fluid. Heparin gtt increased to 21 units/kg/hr. Phosphorus replaced this AM, recheck entered for 1200. UOP 375mls. Pt OOBTC this AM.

## 2021-03-05 NOTE — PROGRESS NOTES
Continued Stay Note   Marco A     Patient Name: Danny Alvarez Jr.  MRN: [id number]  Today's Date: 3/5/2021    Admit Date: 3/1/2021    Discharge Plan     Row Name 03/05/21 1242       Plan    Plan  Home    Patient/Family in Agreement with Plan  yes    Plan Comments  Spoke with patient at bedside. Patient still has chest tube.  Patient denies any needs at this time.  Oncology consult pending. CM will continue to follow.        Discharge Codes    No documentation.       Expected Discharge Date and Time     Expected Discharge Date Expected Discharge Time    Mar 9, 2021             Tayler Roberto RN

## 2021-03-05 NOTE — PROGRESS NOTES
"INTENSIVIST NOTE     Hospital Day: 4    Mr. Danny Alvarez Jr., 75 y.o. male is followed for:   Pleural effusions and hypotension       SUBJECTIVE     75-year-old male admitted on 3/1 with increasing shortness of breath.  He also had significant recent 50 pound weight loss.  Further work-up revealed large bilateral pleural effusions left greater than right.  CT of the abdomen and pelvis revealed a thrombosed portal vein and reticular abnormalities in the right upper quadrant omental fat with concerns of omental metastases.  Echocardiogram revealed a decreased ejection fraction in the range of 41-45%.  There was diastolic dysfunction as well and mild LVH.  He underwent left thoracentesis and subsequent pigtail chest tube placement.  He was transferred to the ICU on the evening of 3/3 after chest tube placement due to concerns over hypotension.    Interval history:    He has had a total of 2.8 L of chest tube output.  Initially this was serous but it is more serosanguineous at the current time.  Fluid balance overall -2 L.  On minimal doses of norepinephrine.  Awake, alert, and oriented and up in bedside chair.    The patient's relevant past medical, surgical and social history were reviewed and updated in Epic as appropriate.       OBJECTIVE     Vital Sign Min/Max for last 24 hours  Temp  Min: 97.5 °F (36.4 °C)  Max: 98.1 °F (36.7 °C)   BP  Min: 73/55  Max: 124/104   Pulse  Min: 74  Max: 110   Resp  Min: 16  Max: 20   SpO2  Min: 86 %  Max: 98 %   No data recorded   Weight  Min: 74.4 kg (164 lb)  Max: 74.4 kg (164 lb)      Intake/Output Summary (Last 24 hours) at 3/5/2021 1344  Last data filed at 3/5/2021 0847  Gross per 24 hour   Intake 1000.5 ml   Output 1420 ml   Net -419.5 ml      Flowsheet Rows      First Filed Value   Admission Height  177.8 cm (70\") Documented at 03/01/2021 1027   Admission Weight  74.8 kg (165 lb) Documented at 03/01/2021 1027             03/01/21  1736 03/03/21  1938 03/04/21  1712   Weight: " "74.8 kg (164 lb 12.8 oz) 74.4 kg (164 lb) 74.4 kg (164 lb)            Objective:  General Appearance:  In no acute distress.    Vital signs: (most recent): Blood pressure 94/62, pulse 99, temperature 97.7 °F (36.5 °C), temperature source Oral, resp. rate 20, height 177.8 cm (70\"), weight 74.4 kg (164 lb), SpO2 (!) 89 %.    HEENT: Normal HEENT exam.  (Nasal cannula O2)    Lungs:  Normal effort and normal respiratory rate.  He is not in respiratory distress.  There are decreased breath sounds.  No rales, wheezes or rhonchi.    Heart: Tachycardia.  Regular rhythm.  S1 normal and S2 normal.  No murmur, gallop or friction rub.   Chest: Symmetric chest wall expansion. (Left pigtail chest tube)  Abdomen: Abdomen is soft and non-distended.  Bowel sounds are normal.   There is no abdominal tenderness.   There is no mass. There is no splenomegaly. There is no hepatomegaly.   Extremities: There is dependent edema.  There is no deformity.    Neurological: Patient is alert and oriented to person, place and time.    Pupils:  Pupils are equal, round, and reactive to light.    Skin:  Warm and dry.              I reviewed the patient's new clinical results.  I reviewed the patient's new imaging results/reports including actual images and agree with reports.      Chest X-Ray: Bilateral pleural effusions and perihilar infiltrates.  Left chest tube well-positioned.  There is a left basilar pneumothorax.    INFUSIONS  heparin, 21 Units/kg/hr, Last Rate: 21 Units/kg/hr (03/05/21 0505)  norepinephrine, 0.02-0.3 mcg/kg/min, Last Rate: Stopped (03/04/21 1215)  Pharmacy to Dose Heparin,         Results from last 7 days   Lab Units 03/05/21  0814 03/05/21  0321 03/04/21  2342 03/04/21  0658  03/03/21  0330   WBC 10*3/mm3  --  10.96*  --  13.01*  --  11.04*   HEMOGLOBIN g/dL 10.6* 9.4* 9.3* 9.5*   < > 11.1*   HEMATOCRIT % 33.4* 29.0* 28.9* 29.6*   < > 35.1*   PLATELETS 10*3/mm3  --  163  --  194  --  203    < > = values in this interval not " displayed.     Results from last 7 days   Lab Units 03/05/21  0321 03/04/21  0658 03/03/21  0442   SODIUM mmol/L 136 135* 131*   POTASSIUM mmol/L 4.7 4.2 4.8   CHLORIDE mmol/L 101 98 97*   CO2 mmol/L 32.0* 28.0 24.0   BUN mg/dL 18 21 18   GLUCOSE mg/dL 154* 207* 201*   CREATININE mg/dL 0.59* 0.66* 0.74*   MAGNESIUM mg/dL 2.0 1.6  --    CALCIUM mg/dL 8.6 9.2 8.3*                 [facility] Ventilation:      I reviewed the patient's medications.    Assessment/Plan   ASSESSMENT/PLAN     Active Hospital Problems    Diagnosis   • **Pleural effusion on left   • Acute on chronic combined systolic and diastolic CHF (congestive heart failure) (CMS/HCC)   • Portal vein thrombosis   • Acute deep vein thrombosis (DVT) of right peroneal vein (CMS/HCC)   • Elevated troponin   • Ascites   • Type 2 diabetes mellitus without complication (CMS/HCC)   • Essential hypertension       1. Bilateral pleural effusions left greater than right: Status post left pigtail chest tube with serosanguineous drainage.  Pleural fluid is exudative.  No purulence and cultures negative so far.  Cytology revealed adenocarcinoma.  2. Portal vein thrombosis: Likely related to the presence of malignancy, likely GI origin..  3. Chronic and possibly acute systolic and diastolic CHF: Contribution to his pleural effusions is unclear.  4. Left basilar pneumothorax: He is off suction currently so we will resume this.  Hopefully this is not a trapped lung.  Short-term goal would be a Pleurx catheter.    1. Oncology consultation  2. Tradjenta for blood sugar control as patient has refused insulin  3. Continue heparin drip  4. Chest tube back to suction  5. Follow-up chest x-ray  6. Possible Pleurx as noted above  7. Discontinue Vasotec due to low blood pressure.     I discussed the patient's findings and my recommendations with patient and nursing staff     Plan of care and goals reviewed with multidisciplinary team at daily rounds.    High level of risk due to:  illness  with threat to life or bodily function.    Josemanuel Liao MD  Pulmonary and Critical Care Medicine  03/05/21 13:44 EST

## 2021-03-05 NOTE — PROGRESS NOTES
HEPARIN INFUSION  Danny Alvarez Jr. is a  75 y.o. male receiving heparin infusion.     Therapy for (VTE/Cardiac):   VTE, portal vein thrombosis  Patient Weight: 74.4 kg  Initial Bolus (Y/N):   Yes  Any Bolus (Y/N):   Yes   Signs or Symptoms of Bleeding: Y, patient does have red tinge liquid from chest tube, please monitor    VTE (PE/DVT)   Initial Bolus: 80 units/kg (Max 10,000 units)  Initial rate: 18 units/kg/hr (Max 1,500 units/hr)      (Anti-Xa) (IU/mL) Bolus Dose Stop Infusion Rate Change Repeat (Anti-Xa)     ? 0.19 80 units/kg 0 hrs Increase rate by   4 units/kg/hr 6 hrs      0.2 - 0.29 40 units/kg 0 hrs Increase rate by 2 units/kg/hr 6 hrs    0.3 - 0.7  0 0 hrs No change 6 hrs      0.71 - 0.99   0  0 hrs Decrease rate by 2 units/kg/hr 6 hrs      ? 1 0 Hold 1 hr Decrease rate by 3 units/kg/hr 6 hrs      Results from last 7 days   Lab Units 03/05/21  0814 03/05/21  0321 03/04/21  2342 03/04/21  0658  03/03/21  0330  03/01/21  1726 03/01/21  1511   INR   --   --   --   --   --   --   --  1.14 1.2   HEMOGLOBIN g/dL 10.6* 9.4* 9.3* 9.5*   < > 11.1*   < > 13.1  --    HEMATOCRIT % 33.4* 29.0* 28.9* 29.6*   < > 35.1*   < > 42.6  --    PLATELETS 10*3/mm3  --  163  --  194  --  203   < > 173  --     < > = values in this interval not displayed.          Date   Time   Anti-Xa Current Rate (Unit/kg/hr) Bolus   (Units) Rate Change   (Unit/kg/hr) New Rate (Unit/kg/hr) Next   Anti-Xa Comments  Pump Check Daily   3/1 1743 0.10 -- 6000 +18 18 0000 DW RN   3/1 2329 0.59 18 -- -- 18 0600 Discussed w/ nurse   3/2 0409 0.5 18 -- -- 18 1200 Dw BERKELY Sweeney   3/2 1250 0.48 18 -- -- 18 2000 Geeta Sweeney RN   3/2 2030 0.42 18 -- -- 18 AM Lab Discussed w/ nurse   3/3 0330 0.56 18 -- -- 18 AM Lab  3/4/21 Discussed w/ nurse   3/3 1524  HOLD     Held since 3/3 for thoracentesis and bleeding after   3/4 1107 -- -- -- +18 18 1800 GEETA Tolbert   3/4 1755 0.28 18 -- +2 20 0200  RN   3/5 0500 0.28 20 -- +1 21 1000 D/w RN   3/5 1003 0.43 21 -- --  21 1600 DW BERKLEY Calero, PharmD  Pharmacy Resident  3/5/2021  11:20 EST

## 2021-03-05 NOTE — PLAN OF CARE
Goal Outcome Evaluation:         Patient BP remains marginal. Chest tube to 20 of suction. Oncology consult called and Dr. King spoke to patient about diagnosis.

## 2021-03-05 NOTE — PROGRESS NOTES
Clinical Nutrition     Multidisciplinary Rounds      Patient Name: Danny Alvarez Jr.  Date of Encounter: 03/05/21 11:26 EST  MRN: [id number]  Admission date: 3/1/2021    LATRICE. RENNY to continue to follow per protocol.     Current diet: Diet Regular; Cardiac, Consistent Carbohydrate  Boost GC 3x/day    Intervention:  Follow treatment plan  Care plan reviewed    Follow up:   Per protocol      Francheska Acevedo RD  11:26 EST  Time: 10min

## 2021-03-05 NOTE — PLAN OF CARE
Goal Outcome Evaluation:     Progress: improving  Outcome Summary: Low dose levophed turned off this morning and BP has been low-normal and patient asymptomatic. Requiring 2L NC at times with o2 dropping to 87% occasionally while on room air. C/o intermittent nausea relieved with zofran. Unable to eat much d/t nausea, but was able to eat a little after dose of zofran. Encouraging high calorie low sugar foods. Heparin gtt restarted without bolus with no increase in chest tube output. Remains sanguinous,700 ml out for shift. BLE duplex positive for right peroneal DVT and other superficial thromboses. Recheck of h/h after 1 unit PRBC this am much improved. Still awaiting cytology results.

## 2021-03-06 ENCOUNTER — APPOINTMENT (OUTPATIENT)
Dept: GENERAL RADIOLOGY | Facility: HOSPITAL | Age: 76
End: 2021-03-06

## 2021-03-06 LAB
ALBUMIN SERPL-MCNC: 3.1 G/DL (ref 3.5–5.2)
ANION GAP SERPL CALCULATED.3IONS-SCNC: 6 MMOL/L (ref 5–15)
BACTERIA SPEC AEROBE CULT: NORMAL
BACTERIA SPEC AEROBE CULT: NORMAL
BACTERIA SPEC ANAEROBE CULT: NORMAL
BASOPHILS # BLD AUTO: 0.05 10*3/MM3 (ref 0–0.2)
BASOPHILS NFR BLD AUTO: 0.3 % (ref 0–1.5)
BH CV VAS HEPATIC - LEFT INTRAHEPATIC: NORMAL
BH CV VAS HEPATIC PORTAL VEIN DIAMETER: 1.2 CM
BH CV VAS SMA AORTA PSV: 41 CM/S
BH CV VAS SMA HEPATIC EDV: 29 CM/S
BH CV VAS SMA HEPATIC PSV: 129 CM/S
BH CV VAS SMA SPLENIC EDV: 41 CM/S
BH CV VAS SMA SPLENIC PSV: 131 CM/S
BUN SERPL-MCNC: 13 MG/DL (ref 8–23)
BUN/CREAT SERPL: 23.6 (ref 7–25)
CALCIUM SPEC-SCNC: 8.3 MG/DL (ref 8.6–10.5)
CHLORIDE SERPL-SCNC: 97 MMOL/L (ref 98–107)
CO2 SERPL-SCNC: 29 MMOL/L (ref 22–29)
CORTIS SERPL-MCNC: 17.74 MCG/DL
CREAT SERPL-MCNC: 0.55 MG/DL (ref 0.76–1.27)
DEPRECATED RDW RBC AUTO: 43.1 FL (ref 37–54)
EOSINOPHIL # BLD AUTO: 0.07 10*3/MM3 (ref 0–0.4)
EOSINOPHIL NFR BLD AUTO: 0.4 % (ref 0.3–6.2)
ERYTHROCYTE [DISTWIDTH] IN BLOOD BY AUTOMATED COUNT: 13.2 % (ref 12.3–15.4)
GFR SERPL CREATININE-BSD FRML MDRD: 145 ML/MIN/1.73
GLUCOSE BLDC GLUCOMTR-MCNC: 179 MG/DL (ref 70–130)
GLUCOSE BLDC GLUCOMTR-MCNC: 183 MG/DL (ref 70–130)
GLUCOSE BLDC GLUCOMTR-MCNC: 213 MG/DL (ref 70–130)
GLUCOSE BLDC GLUCOMTR-MCNC: 219 MG/DL (ref 70–130)
GLUCOSE SERPL-MCNC: 150 MG/DL (ref 65–99)
HCT VFR BLD AUTO: 29.2 % (ref 37.5–51)
HCT VFR BLD AUTO: 35.1 % (ref 37.5–51)
HGB BLD-MCNC: 10.3 G/DL (ref 13–17.7)
HGB BLD-MCNC: 9.5 G/DL (ref 13–17.7)
IMM GRANULOCYTES # BLD AUTO: 0.1 10*3/MM3 (ref 0–0.05)
IMM GRANULOCYTES NFR BLD AUTO: 0.5 % (ref 0–0.5)
LYMPHOCYTES # BLD AUTO: 1.76 10*3/MM3 (ref 0.7–3.1)
LYMPHOCYTES NFR BLD AUTO: 8.8 % (ref 19.6–45.3)
MAGNESIUM SERPL-MCNC: 1.7 MG/DL (ref 1.6–2.4)
MCH RBC QN AUTO: 28.9 PG (ref 26.6–33)
MCHC RBC AUTO-ENTMCNC: 32.5 G/DL (ref 31.5–35.7)
MCV RBC AUTO: 88.8 FL (ref 79–97)
MONOCYTES # BLD AUTO: 2.01 10*3/MM3 (ref 0.1–0.9)
MONOCYTES NFR BLD AUTO: 10.1 % (ref 5–12)
NEUTROPHILS NFR BLD AUTO: 15.95 10*3/MM3 (ref 1.7–7)
NEUTROPHILS NFR BLD AUTO: 79.9 % (ref 42.7–76)
NRBC BLD AUTO-RTO: 0 /100 WBC (ref 0–0.2)
PHOSPHATE SERPL-MCNC: 2.3 MG/DL (ref 2.5–4.5)
PHOSPHATE SERPL-MCNC: 2.4 MG/DL (ref 2.5–4.5)
PLATELET # BLD AUTO: 222 10*3/MM3 (ref 140–450)
PMV BLD AUTO: 11.1 FL (ref 6–12)
POTASSIUM SERPL-SCNC: 4.7 MMOL/L (ref 3.5–5.2)
QT INTERVAL: 394 MS
QTC INTERVAL: 428 MS
RBC # BLD AUTO: 3.29 10*6/MM3 (ref 4.14–5.8)
SODIUM SERPL-SCNC: 132 MMOL/L (ref 136–145)
UFH PPP CHRO-ACNC: 0.29 IU/ML (ref 0.3–0.7)
UFH PPP CHRO-ACNC: 0.34 IU/ML (ref 0.3–0.7)
UFH PPP CHRO-ACNC: 0.4 IU/ML (ref 0.3–0.7)
WBC # BLD AUTO: 19.94 10*3/MM3 (ref 3.4–10.8)

## 2021-03-06 PROCEDURE — 85014 HEMATOCRIT: CPT | Performed by: INTERNAL MEDICINE

## 2021-03-06 PROCEDURE — 82962 GLUCOSE BLOOD TEST: CPT

## 2021-03-06 PROCEDURE — 85520 HEPARIN ASSAY: CPT

## 2021-03-06 PROCEDURE — 84100 ASSAY OF PHOSPHORUS: CPT | Performed by: NURSE PRACTITIONER

## 2021-03-06 PROCEDURE — 85025 COMPLETE CBC W/AUTO DIFF WBC: CPT | Performed by: INTERNAL MEDICINE

## 2021-03-06 PROCEDURE — 99232 SBSQ HOSP IP/OBS MODERATE 35: CPT | Performed by: INTERNAL MEDICINE

## 2021-03-06 PROCEDURE — 25010000003 MAGNESIUM SULFATE 4 GM/100ML SOLUTION: Performed by: INTERNAL MEDICINE

## 2021-03-06 PROCEDURE — 82533 TOTAL CORTISOL: CPT | Performed by: NURSE PRACTITIONER

## 2021-03-06 PROCEDURE — 85018 HEMOGLOBIN: CPT | Performed by: INTERNAL MEDICINE

## 2021-03-06 PROCEDURE — 25010000002 HEPARIN (PORCINE) 25000-0.45 UT/250ML-% SOLUTION

## 2021-03-06 PROCEDURE — 99233 SBSQ HOSP IP/OBS HIGH 50: CPT | Performed by: INTERNAL MEDICINE

## 2021-03-06 PROCEDURE — 71045 X-RAY EXAM CHEST 1 VIEW: CPT

## 2021-03-06 PROCEDURE — 83735 ASSAY OF MAGNESIUM: CPT | Performed by: INTERNAL MEDICINE

## 2021-03-06 PROCEDURE — 63710000001 DRONABINOL PER 2.5 MG: Performed by: INTERNAL MEDICINE

## 2021-03-06 PROCEDURE — 80069 RENAL FUNCTION PANEL: CPT | Performed by: INTERNAL MEDICINE

## 2021-03-06 RX ORDER — DRONABINOL 2.5 MG/1
5 CAPSULE ORAL 2 TIMES DAILY
Status: DISCONTINUED | OUTPATIENT
Start: 2021-03-06 | End: 2021-03-12 | Stop reason: HOSPADM

## 2021-03-06 RX ADMIN — MIDODRINE HYDROCHLORIDE 5 MG: 5 TABLET ORAL at 15:55

## 2021-03-06 RX ADMIN — DRONABINOL 5 MG: 2.5 CAPSULE ORAL at 16:36

## 2021-03-06 RX ADMIN — MIDODRINE HYDROCHLORIDE 5 MG: 5 TABLET ORAL at 21:29

## 2021-03-06 RX ADMIN — MIDODRINE HYDROCHLORIDE 5 MG: 5 TABLET ORAL at 05:37

## 2021-03-06 RX ADMIN — HEPARIN SODIUM 23 UNITS/KG/HR: 10000 INJECTION, SOLUTION INTRAVENOUS at 13:12

## 2021-03-06 RX ADMIN — MAGNESIUM SULFATE HEPTAHYDRATE 4 G: 40 INJECTION, SOLUTION INTRAVENOUS at 06:44

## 2021-03-06 RX ADMIN — SODIUM CHLORIDE, PRESERVATIVE FREE 10 ML: 5 INJECTION INTRAVENOUS at 21:29

## 2021-03-06 RX ADMIN — LINAGLIPTIN 5 MG: 5 TABLET, FILM COATED ORAL at 09:02

## 2021-03-06 RX ADMIN — POTASSIUM & SODIUM PHOSPHATES POWDER PACK 280-160-250 MG 2 PACKET: 280-160-250 PACK at 09:02

## 2021-03-06 NOTE — PROGRESS NOTES
"INTENSIVIST NOTE     Hospital Day: 5    Mr. Danny Alvarez Jr., 75 y.o. male is followed for:   Pleural effusions and hypotension       SUBJECTIVE     75-year-old male admitted on 3/1 with increasing shortness of breath.  He also had significant recent 50 pound weight loss.  Further work-up revealed large bilateral pleural effusions left greater than right.  CT of the abdomen and pelvis revealed a thrombosed portal vein and reticular abnormalities in the right upper quadrant omental fat with concerns of omental metastases.  Echocardiogram revealed a decreased ejection fraction in the range of 41-45%.  There was diastolic dysfunction as well and mild LVH.  He underwent left thoracentesis and subsequent pigtail chest tube placement.  He was transferred to the ICU on the evening of 3/3 after chest tube placement due to concerns over hypotension.  Pleural fluid cytology consistent with adenocarcinoma.    Interval history:    About 610 mL from chest tube over the last 24 hours.  Denies any significant pain.  On a low-dose of norepinephrine.  Chest tube output is serosanguineous.  Overall fluid balance roughly even.    The patient's relevant past medical, surgical and social history were reviewed and updated in Epic as appropriate.       OBJECTIVE     Vital Sign Min/Max for last 24 hours  Temp  Min: 97.3 °F (36.3 °C)  Max: 98.9 °F (37.2 °C)   BP  Min: 77/48  Max: 102/67   Pulse  Min: 76  Max: 107   Resp  Min: 16  Max: 20   SpO2  Min: 85 %  Max: 97 %   No data recorded   No data recorded      Intake/Output Summary (Last 24 hours) at 3/6/2021 1327  Last data filed at 3/6/2021 1312  Gross per 24 hour   Intake 1150.8 ml   Output 1170 ml   Net -19.2 ml      Flowsheet Rows      First Filed Value   Admission Height  177.8 cm (70\") Documented at 03/01/2021 1027   Admission Weight  74.8 kg (165 lb) Documented at 03/01/2021 1027             03/01/21  1736 03/03/21  1938 03/04/21  1712   Weight: 74.8 kg (164 lb 12.8 oz) 74.4 kg (164 " "lb) 74.4 kg (164 lb)            Objective:  General Appearance:  In no acute distress.    Vital signs: (most recent): Blood pressure 97/76, pulse 98, temperature 98.9 °F (37.2 °C), temperature source Oral, resp. rate 16, height 177.8 cm (70\"), weight 74.4 kg (164 lb), SpO2 91 %.    HEENT: Normal HEENT exam.  (Nasal cannula O2)    Lungs:  Normal effort and normal respiratory rate.  He is not in respiratory distress.  There are decreased breath sounds.  No rales, wheezes or rhonchi.    Heart: Tachycardia.  Regular rhythm.  S1 normal and S2 normal.  No murmur, gallop or friction rub.   Chest: Symmetric chest wall expansion. (Left pigtail chest tube)  Abdomen: Abdomen is soft and non-distended.  Bowel sounds are normal.   There is no abdominal tenderness.   There is no mass. There is no splenomegaly. There is no hepatomegaly.   Extremities: There is dependent edema.  There is no deformity.    Neurological: Patient is alert and oriented to person, place and time.    Pupils:  Pupils are equal, round, and reactive to light.    Skin:  Warm and dry.              I reviewed the patient's new clinical results.  I reviewed the patient's new imaging results/reports including actual images and agree with reports.      Chest X-Ray: Bilateral pleural effusions and perihilar infiltrates.  Left chest tube well-positioned.  There is a left basilar pneumothorax and some pleural thickening    INFUSIONS  heparin, 23 Units/kg/hr, Last Rate: 23 Units/kg/hr (03/06/21 1312)  norepinephrine, 0.02-0.3 mcg/kg/min, Last Rate: 0.03 mcg/kg/min (03/06/21 1256)  Pharmacy to Dose Heparin,         Results from last 7 days   Lab Units 03/06/21  0411 03/05/21  2059 03/05/21  1357 03/05/21  0321 03/04/21  0658   WBC 10*3/mm3 19.94*  --   --  10.96* 13.01*   HEMOGLOBIN g/dL 9.5* 9.7* 10.2* 9.4* 9.5*   HEMATOCRIT % 29.2* 30.3* 31.5* 29.0* 29.6*   PLATELETS 10*3/mm3 222  --   --  163 194     Results from last 7 days   Lab Units 03/06/21  0411 03/05/21  0321 " 03/04/21  0658   SODIUM mmol/L 132* 136 135*   POTASSIUM mmol/L 4.7 4.7 4.2   CHLORIDE mmol/L 97* 101 98   CO2 mmol/L 29.0 32.0* 28.0   BUN mg/dL 13 18 21   GLUCOSE mg/dL 150* 154* 207*   CREATININE mg/dL 0.55* 0.59* 0.66*   MAGNESIUM mg/dL 1.7 2.0 1.6   CALCIUM mg/dL 8.3* 8.6 9.2                 [facility] Ventilation:      I reviewed the patient's medications.    Assessment/Plan   ASSESSMENT/PLAN     Active Hospital Problems    Diagnosis    • **Pleural effusion on left    • Acute on chronic combined systolic and diastolic CHF (congestive heart failure) (CMS/HCC)    • Portal vein thrombosis    • Acute deep vein thrombosis (DVT) of right peroneal vein (CMS/HCC)    • Elevated troponin    • Ascites    • Type 2 diabetes mellitus without complication (CMS/HCC)    • Essential hypertension        1. Bilateral pleural effusions left greater than right: Status post left pigtail chest tube with serosanguineous drainage.  Pleural fluid is exudative.  No purulence and cultures negative so far.  Cytology revealed adenocarcinoma.  Further identification of the tumor is pending.  2. Portal vein thrombosis: Likely related to the presence of malignancy, likely GI origin..  3. Chronic and possibly acute systolic and diastolic CHF: Contribution to his pleural effusions is unclear.  4. Left basilar pneumothorax: He appears to have some pleural peel and a hydropneumothorax.  Chest tube placed back to suction yesterday.    1. Oncology input appreciated.  Discussed with Dr. Desouza.  Awaiting further identification of adenocarcinoma cells seen in pleural fluid.  2. Tradjenta for blood sugar control as patient has refused insulin  3. Continue heparin drip  4. Continue chest tube suction  5. Follow-up chest x-ray  6. Possible Pleurx as noted above.  The problem will be if he has continued hydropneumothorax or trapped lung.  May need surgical intervention.  7. Wean norepinephrine     I discussed the patient's findings and my recommendations with  patient and nursing staff     Plan of care and goals reviewed with multidisciplinary team at daily rounds.    High level of risk due to:  illness with threat to life or bodily function.    Josemanuel Liao MD  Pulmonary and Critical Care Medicine  03/06/21 13:27 EST

## 2021-03-06 NOTE — PROGRESS NOTES
HEPARIN INFUSION  Danny Alvarez Jr. is a  75 y.o. male receiving heparin infusion.     Therapy for (VTE/Cardiac):   VTE, portal vein thrombosis  Patient Weight: 74.4 kg  Initial Bolus (Y/N):   Yes  Any Bolus (Y/N):   Yes   Signs or Symptoms of Bleeding: Y, patient does have red tinge liquid from chest tube, please monitor    VTE (PE/DVT)   Initial Bolus: 80 units/kg (Max 10,000 units)  Initial rate: 18 units/kg/hr (Max 1,500 units/hr)      (Anti-Xa) (IU/mL) Bolus Dose Stop Infusion Rate Change Repeat (Anti-Xa)     ? 0.19 80 units/kg 0 hrs Increase rate by   4 units/kg/hr 6 hrs      0.2 - 0.29 40 units/kg 0 hrs Increase rate by 2 units/kg/hr 6 hrs    0.3 - 0.7  0 0 hrs No change 6 hrs      0.71 - 0.99   0  0 hrs Decrease rate by 2 units/kg/hr 6 hrs      ? 1 0 Hold 1 hr Decrease rate by 3 units/kg/hr 6 hrs      Results from last 7 days   Lab Units 03/06/21  0411 03/05/21  2059 03/05/21  1357 03/05/21  0321 03/04/21  0658 03/01/21  1726 03/01/21  1511   INR   --   --   --   --   --  1.14 1.2   HEMOGLOBIN g/dL 9.5* 9.7* 10.2* 9.4* 9.5* 13.1  --    HEMATOCRIT % 29.2* 30.3* 31.5* 29.0* 29.6* 42.6  --    PLATELETS 10*3/mm3 222  --   --  163 194 173  --           Date   Time   Anti-Xa Current Rate (Unit/kg/hr) Bolus   (Units) Rate Change   (Unit/kg/hr) New Rate (Unit/kg/hr) Next   Anti-Xa Comments  Pump Check Daily   3/1 1743 0.10 -- 6000 +18 18 0000 GEETA RN   3/1 2329 0.59 18 -- -- 18 0600 Discussed w/ nurse   3/2 0409 0.5 18 -- -- 18 1200 Geeta Sweeney RN   3/2 1250 0.48 18 -- -- 18 2000 Geeta Sweeney RN   3/2 2030 0.42 18 -- -- 18 AM Lab Discussed w/ nurse   3/3 0330 0.56 18 -- -- 18 AM Lab  3/4/21 Discussed w/ nurse   3/3 1524  HOLD     Held since 3/3 for thoracentesis and bleeding after   3/4 1107 -- -- -- +18 18 1800 DW BERKLEY Tolbert   3/4 1755 0.28 18 -- +2 20 0200 Dw RN   3/5 0500 0.28 20 -- +1 21 1000 D/w RN   3/5 1003 0.43 21 -- -- 21 1600 GEETA Sanchez   3/5 1357 0.41 21 -- -- 21 2000 DW RN   3/5 2059 0.38 21 -- -- 21 0600 Geeta GOODEN    3/6 0411 0.29 21 -- +2 23 1400 D/w RN

## 2021-03-06 NOTE — PROGRESS NOTES
Subjective     PROBLEM LIST:  Patient Active Problem List   Diagnosis   • Pleural effusion on left   • Elevated troponin   • Ascites   • Portal vein thrombosis   • Essential hypertension   • Obstructive sleep apnea syndrome   • Type 2 diabetes mellitus without complication (CMS/HCC)   • Acute on chronic combined systolic and diastolic CHF (congestive heart failure) (CMS/HCC)   • Acute deep vein thrombosis (DVT) of right peroneal vein (CMS/HCC)         INTERVAL HISTORY: feeling ok today.  Still nausea nad no appetite.  Still intermittently requiring pressors.  Continues to have blood output from chest tube.      REVIEW OF SYSTEMS:  A 14 point review of systems was performed and is negative except as noted above.      Objective     Vitals:    03/06/21 0515 03/06/21 0530 03/06/21 0600 03/06/21 0615   BP: 90/62 93/63 94/63 100/65   BP Location:       Patient Position:       Pulse: 90 88 92 88   Resp:       Temp:       TempSrc:       SpO2: 94% 95% 95% 96%   Weight:       Height:                       Performance Status: 3  General: cachectic appearing male in no acute distress  Neuro: alert and oriented  HEENT: sclerae anicteric, oropharynx clear  Lymphatics: no cervical, supraclavicular, or axillary adenopathy  Cardiovascular: regular rate and rhythm, no murmurs  Lungs: decreased bilateral bases  Abdomen: soft, nontender, nondistended.  No palpable organomegaly  Extremities: no lower extremity edema  Skin: no rashes, lesions, bruising, or petechiae  Psych: mood and affect appropriate        Labs:       WBC   Date Value Ref Range Status   03/06/2021 19.94 (H) 3.40 - 10.80 10*3/mm3 Final     RBC   Date Value Ref Range Status   03/06/2021 3.29 (L) 4.14 - 5.80 10*6/mm3 Final     Hemoglobin   Date Value Ref Range Status   03/06/2021 9.5 (L) 13.0 - 17.7 g/dL Final     Hematocrit   Date Value Ref Range Status   03/06/2021 29.2 (L) 37.5 - 51.0 % Final     MCV   Date Value Ref Range Status   03/06/2021 88.8 79.0 - 97.0 fL  Final     MCH   Date Value Ref Range Status   03/06/2021 28.9 26.6 - 33.0 pg Final     MCHC   Date Value Ref Range Status   03/06/2021 32.5 31.5 - 35.7 g/dL Final     RDW   Date Value Ref Range Status   03/06/2021 13.2 12.3 - 15.4 % Final     RDW-SD   Date Value Ref Range Status   03/06/2021 43.1 37.0 - 54.0 fl Final     MPV   Date Value Ref Range Status   03/06/2021 11.1 6.0 - 12.0 fL Final     Platelets   Date Value Ref Range Status   03/06/2021 222 140 - 450 10*3/mm3 Final     Neutrophil %   Date Value Ref Range Status   03/06/2021 79.9 (H) 42.7 - 76.0 % Final     Lymphocyte %   Date Value Ref Range Status   03/06/2021 8.8 (L) 19.6 - 45.3 % Final     Monocyte %   Date Value Ref Range Status   03/06/2021 10.1 5.0 - 12.0 % Final     Eosinophil %   Date Value Ref Range Status   03/06/2021 0.4 0.3 - 6.2 % Final     Basophil %   Date Value Ref Range Status   03/06/2021 0.3 0.0 - 1.5 % Final     Immature Grans %   Date Value Ref Range Status   03/06/2021 0.5 0.0 - 0.5 % Final     Neutrophils, Absolute   Date Value Ref Range Status   03/06/2021 15.95 (H) 1.70 - 7.00 10*3/mm3 Final     Lymphocytes, Absolute   Date Value Ref Range Status   03/06/2021 1.76 0.70 - 3.10 10*3/mm3 Final     Monocytes, Absolute   Date Value Ref Range Status   03/06/2021 2.01 (H) 0.10 - 0.90 10*3/mm3 Final     Eosinophils, Absolute   Date Value Ref Range Status   03/06/2021 0.07 0.00 - 0.40 10*3/mm3 Final     Basophils, Absolute   Date Value Ref Range Status   03/06/2021 0.05 0.00 - 0.20 10*3/mm3 Final     Immature Grans, Absolute   Date Value Ref Range Status   03/06/2021 0.10 (H) 0.00 - 0.05 10*3/mm3 Final     nRBC   Date Value Ref Range Status   03/06/2021 0.0 0.0 - 0.2 /100 WBC Final       Glucose   Date Value Ref Range Status   03/06/2021 150 (H) 65 - 99 mg/dL Final     BUN   Date Value Ref Range Status   03/06/2021 13 8 - 23 mg/dL Final     Creatinine   Date Value Ref Range Status   03/06/2021 0.55 (L) 0.76 - 1.27 mg/dL Final     Sodium    Date Value Ref Range Status   03/06/2021 132 (L) 136 - 145 mmol/L Final     Potassium   Date Value Ref Range Status   03/06/2021 4.7 3.5 - 5.2 mmol/L Final     Chloride   Date Value Ref Range Status   03/06/2021 97 (L) 98 - 107 mmol/L Final     CO2   Date Value Ref Range Status   03/06/2021 29.0 22.0 - 29.0 mmol/L Final     Calcium   Date Value Ref Range Status   03/06/2021 8.3 (L) 8.6 - 10.5 mg/dL Final     Total Protein   Date Value Ref Range Status   03/04/2021 5.5 (L) 6.0 - 8.5 g/dL Final     Albumin   Date Value Ref Range Status   03/06/2021 3.10 (L) 3.50 - 5.20 g/dL Final     ALT (SGPT)   Date Value Ref Range Status   03/04/2021 13 1 - 41 U/L Final     AST (SGOT)   Date Value Ref Range Status   03/04/2021 65 (H) 1 - 40 U/L Final     Alkaline Phosphatase   Date Value Ref Range Status   03/04/2021 84 39 - 117 U/L Final     Total Bilirubin   Date Value Ref Range Status   03/04/2021 0.8 0.0 - 1.2 mg/dL Final     eGFR Non  Amer   Date Value Ref Range Status   03/06/2021 145 >60 mL/min/1.73 Final     BUN/Creatinine Ratio   Date Value Ref Range Status   03/06/2021 23.6 7.0 - 25.0 Final     Anion Gap   Date Value Ref Range Status   03/06/2021 6.0 5.0 - 15.0 mmol/L Final       Lab Results   Component Value Date    CEA 0.75 03/05/2021               Assessment/Plan     Danny Alvarez Jr. is a 75 y.o. year old male  with metastatic malignancy with bilateral large effusion, possible omental disease, portal vein thrombosis and lower extremity DVT.     Metastatic adenocarcinoma: CA 19/9 markedly elevated, CEA normal.  CA 19-9 would suggest pancreatic/biliary primary, but not 100% accurate.  awaiting final results on cytology.    Bilateral pleural effusions: depending on rate of continued drainage, may benefit from pleurx catheter in the short term.  If very bloody, may need to consider thoracotomy.     Portal vein thrombosis/DVT: secondary to metastatic malignancy.  Will need indefinite anticoagulation.      DM: no  current tx.    D/w Dr. Liao.                    Anupama Desouza MD  3/6/2021

## 2021-03-06 NOTE — PLAN OF CARE
Problem: Adult Inpatient Plan of Care  Goal: Plan of Care Review  3/6/2021 0520 by Nahomy Kasper, RN  Flowsheets  Taken 3/6/2021 0520  Plan of Care Reviewed With: patient  Outcome Summary: Pt BP marginal- albumin 5% 500 ml given and dose of midodrine w/o improvement. Levophed gtt resumed. CT output 210. . RA-1L NC throughout the night. No complaints of pain. Afebrile. Awaiting AM labs.

## 2021-03-06 NOTE — PROGRESS NOTES
HEPARIN INFUSION  Danny Alvarez Jr. is a  75 y.o. male receiving heparin infusion.     Therapy for (VTE/Cardiac):   VTE, portal vein thrombosis  Patient Weight: 74.4 kg  Initial Bolus (Y/N):   Yes  Any Bolus (Y/N):   Yes   Signs or Symptoms of Bleeding: Y, patient does have red tinge liquid from chest tube, please monitor    VTE (PE/DVT)   Initial Bolus: 80 units/kg (Max 10,000 units)  Initial rate: 18 units/kg/hr (Max 1,500 units/hr)      (Anti-Xa) (IU/mL) Bolus Dose Stop Infusion Rate Change Repeat (Anti-Xa)     ? 0.19 80 units/kg 0 hrs Increase rate by   4 units/kg/hr 6 hrs      0.2 - 0.29 40 units/kg 0 hrs Increase rate by 2 units/kg/hr 6 hrs    0.3 - 0.7  0 0 hrs No change 6 hrs      0.71 - 0.99   0  0 hrs Decrease rate by 2 units/kg/hr 6 hrs      ? 1 0 Hold 1 hr Decrease rate by 3 units/kg/hr 6 hrs      Results from last 7 days   Lab Units 03/06/21  1357 03/06/21  0411 03/05/21  2059 03/05/21  0321 03/04/21  0658 03/01/21  1726 03/01/21  1511   INR   --   --   --   --   --  1.14 1.2   HEMOGLOBIN g/dL 10.3* 9.5* 9.7* 9.4* 9.5* 13.1  --    HEMATOCRIT % 35.1* 29.2* 30.3* 29.0* 29.6* 42.6  --    PLATELETS 10*3/mm3  --  222  --  163 194 173  --           Date   Time   Anti-Xa Current Rate (Unit/kg/hr) Bolus   (Units) Rate Change   (Unit/kg/hr) New Rate (Unit/kg/hr) Next   Anti-Xa Comments  Pump Check Daily   3/1 1743 0.10 -- 6000 +18 18 0000 GEETA RN   3/1 2329 0.59 18 -- -- 18 0600 Discussed w/ nurse   3/2 0409 0.5 18 -- -- 18 1200 Geeta Sweeney RN   3/2 1250 0.48 18 -- -- 18 2000 Geeta Sweeney RN   3/2 2030 0.42 18 -- -- 18 AM Lab Discussed w/ nurse   3/3 0330 0.56 18 -- -- 18 AM Lab  3/4/21 Discussed w/ nurse   3/3 1524  HOLD     Held since 3/3 for thoracentesis and bleeding after   3/4 1107 -- -- -- +18 18 1800 DW BERKLEY Tolbert   3/4 1755 0.28 18 -- +2 20 0200 Dw RN   3/5 0500 0.28 20 -- +1 21 1000 D/w RN   3/5 1003 0.43 21 -- -- 21 1600 GEETA Sanchez   3/5 1357 0.41 21 -- -- 21 2000 DW RN   3/5 2059 0.38 21 -- -- 21 0600 Geeta GOODEN    3/6 0411 0.29 21 -- +2 23 1400 D/w RN   3/6 1357 0.4 23 -- -- 23 2200 D/w Summer; pump verified

## 2021-03-07 ENCOUNTER — APPOINTMENT (OUTPATIENT)
Dept: CT IMAGING | Facility: HOSPITAL | Age: 76
End: 2021-03-07

## 2021-03-07 ENCOUNTER — APPOINTMENT (OUTPATIENT)
Dept: GENERAL RADIOLOGY | Facility: HOSPITAL | Age: 76
End: 2021-03-07

## 2021-03-07 LAB
ALBUMIN SERPL-MCNC: 2.9 G/DL (ref 3.5–5.2)
ANION GAP SERPL CALCULATED.3IONS-SCNC: 5 MMOL/L (ref 5–15)
BASOPHILS # BLD AUTO: 0.04 10*3/MM3 (ref 0–0.2)
BASOPHILS NFR BLD AUTO: 0.3 % (ref 0–1.5)
BUN SERPL-MCNC: 9 MG/DL (ref 8–23)
BUN/CREAT SERPL: 17 (ref 7–25)
CALCIUM SPEC-SCNC: 8.6 MG/DL (ref 8.6–10.5)
CHLORIDE SERPL-SCNC: 95 MMOL/L (ref 98–107)
CO2 SERPL-SCNC: 31 MMOL/L (ref 22–29)
CREAT SERPL-MCNC: 0.53 MG/DL (ref 0.76–1.27)
DEPRECATED RDW RBC AUTO: 43.3 FL (ref 37–54)
EOSINOPHIL # BLD AUTO: 0.16 10*3/MM3 (ref 0–0.4)
EOSINOPHIL NFR BLD AUTO: 1 % (ref 0.3–6.2)
ERYTHROCYTE [DISTWIDTH] IN BLOOD BY AUTOMATED COUNT: 13.2 % (ref 12.3–15.4)
GFR SERPL CREATININE-BSD FRML MDRD: >150 ML/MIN/1.73
GLUCOSE BLDC GLUCOMTR-MCNC: 178 MG/DL (ref 70–130)
GLUCOSE BLDC GLUCOMTR-MCNC: 194 MG/DL (ref 70–130)
GLUCOSE BLDC GLUCOMTR-MCNC: 202 MG/DL (ref 70–130)
GLUCOSE BLDC GLUCOMTR-MCNC: 215 MG/DL (ref 70–130)
GLUCOSE SERPL-MCNC: 207 MG/DL (ref 65–99)
HCT VFR BLD AUTO: 30 % (ref 37.5–51)
HCT VFR BLD AUTO: 30.5 % (ref 37.5–51)
HCT VFR BLD AUTO: 30.5 % (ref 37.5–51)
HGB BLD-MCNC: 9.5 G/DL (ref 13–17.7)
HGB BLD-MCNC: 9.7 G/DL (ref 13–17.7)
HGB BLD-MCNC: 9.7 G/DL (ref 13–17.7)
IMM GRANULOCYTES # BLD AUTO: 0.07 10*3/MM3 (ref 0–0.05)
IMM GRANULOCYTES NFR BLD AUTO: 0.5 % (ref 0–0.5)
LYMPHOCYTES # BLD AUTO: 1.65 10*3/MM3 (ref 0.7–3.1)
LYMPHOCYTES NFR BLD AUTO: 10.7 % (ref 19.6–45.3)
MAGNESIUM SERPL-MCNC: 2 MG/DL (ref 1.6–2.4)
MCH RBC QN AUTO: 28.3 PG (ref 26.6–33)
MCHC RBC AUTO-ENTMCNC: 31.8 G/DL (ref 31.5–35.7)
MCV RBC AUTO: 88.9 FL (ref 79–97)
MONOCYTES # BLD AUTO: 1.6 10*3/MM3 (ref 0.1–0.9)
MONOCYTES NFR BLD AUTO: 10.4 % (ref 5–12)
NEUTROPHILS NFR BLD AUTO: 11.9 10*3/MM3 (ref 1.7–7)
NEUTROPHILS NFR BLD AUTO: 77.1 % (ref 42.7–76)
NRBC BLD AUTO-RTO: 0 /100 WBC (ref 0–0.2)
PHOSPHATE SERPL-MCNC: 2.6 MG/DL (ref 2.5–4.5)
PLATELET # BLD AUTO: 230 10*3/MM3 (ref 140–450)
PMV BLD AUTO: 11.3 FL (ref 6–12)
POTASSIUM SERPL-SCNC: 4.7 MMOL/L (ref 3.5–5.2)
RBC # BLD AUTO: 3.43 10*6/MM3 (ref 4.14–5.8)
SODIUM SERPL-SCNC: 131 MMOL/L (ref 136–145)
UFH PPP CHRO-ACNC: 0.37 IU/ML (ref 0.3–0.7)
UFH PPP CHRO-ACNC: >1.1 IU/ML (ref 0.3–0.7)
WBC # BLD AUTO: 15.42 10*3/MM3 (ref 3.4–10.8)

## 2021-03-07 PROCEDURE — 99232 SBSQ HOSP IP/OBS MODERATE 35: CPT | Performed by: INTERNAL MEDICINE

## 2021-03-07 PROCEDURE — 71250 CT THORAX DX C-: CPT

## 2021-03-07 PROCEDURE — 82962 GLUCOSE BLOOD TEST: CPT

## 2021-03-07 PROCEDURE — 85025 COMPLETE CBC W/AUTO DIFF WBC: CPT | Performed by: INTERNAL MEDICINE

## 2021-03-07 PROCEDURE — 71045 X-RAY EXAM CHEST 1 VIEW: CPT

## 2021-03-07 PROCEDURE — 63710000001 DRONABINOL PER 2.5 MG: Performed by: INTERNAL MEDICINE

## 2021-03-07 PROCEDURE — 25010000002 HEPARIN (PORCINE) 25000-0.45 UT/250ML-% SOLUTION

## 2021-03-07 PROCEDURE — 85018 HEMOGLOBIN: CPT | Performed by: INTERNAL MEDICINE

## 2021-03-07 PROCEDURE — 85520 HEPARIN ASSAY: CPT

## 2021-03-07 PROCEDURE — 83735 ASSAY OF MAGNESIUM: CPT | Performed by: INTERNAL MEDICINE

## 2021-03-07 PROCEDURE — 85014 HEMATOCRIT: CPT | Performed by: INTERNAL MEDICINE

## 2021-03-07 PROCEDURE — 80069 RENAL FUNCTION PANEL: CPT | Performed by: INTERNAL MEDICINE

## 2021-03-07 RX ADMIN — DRONABINOL 5 MG: 2.5 CAPSULE ORAL at 18:52

## 2021-03-07 RX ADMIN — HEPARIN SODIUM 23 UNITS/KG/HR: 10000 INJECTION, SOLUTION INTRAVENOUS at 03:08

## 2021-03-07 RX ADMIN — MIDODRINE HYDROCHLORIDE 5 MG: 5 TABLET ORAL at 15:17

## 2021-03-07 RX ADMIN — MIDODRINE HYDROCHLORIDE 5 MG: 5 TABLET ORAL at 22:04

## 2021-03-07 RX ADMIN — MIDODRINE HYDROCHLORIDE 5 MG: 5 TABLET ORAL at 05:08

## 2021-03-07 RX ADMIN — ANTACID TABLETS 2 TABLET: 500 TABLET, CHEWABLE ORAL at 15:27

## 2021-03-07 RX ADMIN — SODIUM CHLORIDE, PRESERVATIVE FREE 10 ML: 5 INJECTION INTRAVENOUS at 20:31

## 2021-03-07 RX ADMIN — LINAGLIPTIN 5 MG: 5 TABLET, FILM COATED ORAL at 09:10

## 2021-03-07 RX ADMIN — HEPARIN SODIUM 23 UNITS/KG/HR: 10000 INJECTION, SOLUTION INTRAVENOUS at 18:06

## 2021-03-07 RX ADMIN — DRONABINOL 5 MG: 2.5 CAPSULE ORAL at 05:08

## 2021-03-07 NOTE — PROGRESS NOTES
"INTENSIVIST NOTE     Hospital Day: 6    Mr. Danny Alvarez Jr., 75 y.o. male is followed for:   Pleural effusions and hypotension       SUBJECTIVE     75-year-old male admitted on 3/1 with increasing shortness of breath.  He also had significant recent 50 pound weight loss.  Further work-up revealed large bilateral pleural effusions left greater than right.  CT of the abdomen and pelvis revealed a thrombosed portal vein and reticular abnormalities in the right upper quadrant omental fat with concerns of omental metastases.  Echocardiogram revealed a decreased ejection fraction in the range of 41-45%.  There was diastolic dysfunction as well and mild LVH.  He underwent left thoracentesis and subsequent pigtail chest tube placement.  He was transferred to the ICU on the evening of 3/3 after chest tube placement due to concerns over hypotension.  Pleural fluid cytology consistent with adenocarcinoma.    Interval history:    850 mL of serosanguineous drainage from chest tube.  He remains comfortable on room air.  Low-dose norepinephrine was discontinued this morning.  Remains on a heparin drip.  Final cytology from pleural fluid pending.    The patient's relevant past medical, surgical and social history were reviewed and updated in Epic as appropriate.       OBJECTIVE     Vital Sign Min/Max for last 24 hours  Temp  Min: 96.1 °F (35.6 °C)  Max: 98.2 °F (36.8 °C)   BP  Min: 81/52  Max: 136/91   Pulse  Min: 76  Max: 101   Resp  Min: 16  Max: 22   SpO2  Min: 88 %  Max: 100 %   No data recorded   No data recorded      Intake/Output Summary (Last 24 hours) at 3/7/2021 1336  Last data filed at 3/7/2021 1100  Gross per 24 hour   Intake 580.8 ml   Output 1910 ml   Net -1329.2 ml      Flowsheet Rows      First Filed Value   Admission Height  177.8 cm (70\") Documented at 03/01/2021 1027   Admission Weight  74.8 kg (165 lb) Documented at 03/01/2021 1027             03/01/21  1736 03/03/21  1938 03/04/21  1712   Weight: 74.8 kg (164 " "lb 12.8 oz) 74.4 kg (164 lb) 74.4 kg (164 lb)            Objective:  General Appearance:  In no acute distress.    Vital signs: (most recent): Blood pressure 105/70, pulse 90, temperature 96.1 °F (35.6 °C), temperature source Oral, resp. rate 20, height 177.8 cm (70\"), weight 74.4 kg (164 lb), SpO2 90 %.    HEENT: Normal HEENT exam.  (Nasal cannula O2)    Lungs:  Normal effort and normal respiratory rate.  He is not in respiratory distress.  There are decreased breath sounds.  No rales, wheezes or rhonchi.    Heart: Tachycardia.  Regular rhythm.  S1 normal and S2 normal.  No murmur, gallop or friction rub.   Chest: Symmetric chest wall expansion. (Left pigtail chest tube)  Abdomen: Abdomen is soft and non-distended.  Bowel sounds are normal.   There is no abdominal tenderness.   There is no mass. There is no splenomegaly. There is no hepatomegaly.   Extremities: There is dependent edema.  There is no deformity.    Neurological: Patient is alert and oriented to person, place and time.    Pupils:  Pupils are equal, round, and reactive to light.    Skin:  Warm and dry.              I reviewed the patient's new clinical results.  I reviewed the patient's new imaging results/reports including actual images and agree with reports.      Chest X-Ray: Bilateral pleural effusions and perihilar infiltrates.  Left chest tube well-positioned.  There is a left sided pleural air and some pleural thickening    INFUSIONS  heparin, 23 Units/kg/hr, Last Rate: 23 Units/kg/hr (03/07/21 0308)  norepinephrine, 0.02-0.3 mcg/kg/min, Last Rate: Stopped (03/07/21 0900)  Pharmacy to Dose Heparin,         Results from last 7 days   Lab Units 03/07/21  0310 03/06/21  2357 03/06/21  1357 03/06/21  0411 03/05/21  0321   WBC 10*3/mm3 15.42*  --   --  19.94* 10.96*   HEMOGLOBIN g/dL 9.7*  9.7* 9.5* 10.3* 9.5* 9.4*   HEMATOCRIT % 30.5*  30.5* 30.0* 35.1* 29.2* 29.0*   PLATELETS 10*3/mm3 230  --   --  222 163     Results from last 7 days   Lab Units " 03/07/21  0310 03/06/21  0411 03/05/21  0321   SODIUM mmol/L 131* 132* 136   POTASSIUM mmol/L 4.7 4.7 4.7   CHLORIDE mmol/L 95* 97* 101   CO2 mmol/L 31.0* 29.0 32.0*   BUN mg/dL 9 13 18   GLUCOSE mg/dL 207* 150* 154*   CREATININE mg/dL 0.53* 0.55* 0.59*   MAGNESIUM mg/dL 2.0 1.7 2.0   CALCIUM mg/dL 8.6 8.3* 8.6                 [facility] Ventilation:      I reviewed the patient's medications.    Assessment/Plan   ASSESSMENT/PLAN     Active Hospital Problems    Diagnosis    • **Pleural effusion on left    • Acute on chronic combined systolic and diastolic CHF (congestive heart failure) (CMS/HCC)    • Portal vein thrombosis    • Acute deep vein thrombosis (DVT) of right peroneal vein (CMS/HCC)    • Elevated troponin    • Ascites    • Type 2 diabetes mellitus without complication (CMS/HCC)    • Essential hypertension        1. Bilateral pleural effusions left greater than right: Status post left pigtail chest tube with serosanguineous drainage.  Pleural fluid is exudative.  No purulence and cultures negative so far.  Cytology revealed adenocarcinoma.  Further identification of the tumor is pending.  2. Portal vein thrombosis: Likely related to the presence of malignancy, likely GI origin..  3. Chronic and possibly acute systolic and diastolic CHF: Contribution to his pleural effusions is unclear.  4. Left basilar pneumothorax: He appears to have some pleural peel and a hydropneumothorax.  Chest tube placed back to suction Friday.    1. Oncology input appreciated.  Discussed with Dr. Desouza.  Awaiting further identification of adenocarcinoma cells seen in pleural fluid.  Suspect GI origin.  2. Tradjenta for blood sugar control as patient has refused insulin  3. Continue heparin drip  4. Continue chest tube suction  5. Possible Pleurx as noted above.  The problem will be if he has continued hydropneumothorax or trapped lung.  May need surgical intervention.  Will do chest CT today.     I discussed the patient's findings and my  recommendations with patient and nursing staff     Plan of care and goals reviewed with multidisciplinary team at daily rounds.    High level of risk due to:  illness with threat to life or bodily function.    Josemanuel Liao MD  Pulmonary and Critical Care Medicine  03/07/21 13:36 EST

## 2021-03-07 NOTE — PROGRESS NOTES
HEPARIN INFUSION  Danny Alvarez Jr. is a  75 y.o. male receiving heparin infusion.     Therapy for (VTE/Cardiac):   VTE, portal vein thrombosis  Patient Weight: 74.4 kg  Initial Bolus (Y/N):   Yes  Any Bolus (Y/N):   Yes   Signs or Symptoms of Bleeding: none    VTE (PE/DVT)   Initial Bolus: 80 units/kg (Max 10,000 units)  Initial rate: 18 units/kg/hr (Max 1,500 units/hr)      (Anti-Xa) (IU/mL) Bolus Dose Stop Infusion Rate Change Repeat (Anti-Xa)     ? 0.19 80 units/kg 0 hrs Increase rate by   4 units/kg/hr 6 hrs      0.2 - 0.29 40 units/kg 0 hrs Increase rate by 2 units/kg/hr 6 hrs    0.3 - 0.7  0 0 hrs No change 6 hrs      0.71 - 0.99   0  0 hrs Decrease rate by 2 units/kg/hr 6 hrs      ? 1 0 Hold 1 hr Decrease rate by 3 units/kg/hr 6 hrs      Results from last 7 days   Lab Units 03/07/21  0310 03/06/21  2357 03/06/21  1357 03/06/21  0411 03/05/21  0321 03/01/21  1726 03/01/21  1511   INR   --   --   --   --   --  1.14 1.2   HEMOGLOBIN g/dL 9.7*  9.7* 9.5* 10.3* 9.5* 9.4* 13.1  --    HEMATOCRIT % 30.5*  30.5* 30.0* 35.1* 29.2* 29.0* 42.6  --    PLATELETS 10*3/mm3 230  --   --  222 163 173  --           Date   Time   Anti-Xa Current Rate (Unit/kg/hr) Bolus   (Units) Rate Change   (Unit/kg/hr) New Rate (Unit/kg/hr) Next   Anti-Xa Comments  Pump Check Daily   3/1 1743 0.10 -- 6000 +18 18 0000 CIARAN RN   3/1 2329 0.59 18 -- -- 18 0600 Discussed w/ nurse   3/2 0409 0.5 18 -- -- 18 1200 Dw BERKLEY Sweeney   3/2 1250 0.48 18 -- -- 18 2000 Ciaran Sweeney RN   3/2 2030 0.42 18 -- -- 18 AM Lab Discussed w/ nurse   3/3 0330 0.56 18 -- -- 18 AM Lab  3/4/21 Discussed w/ nurse   3/3 1524  HOLD     Held since 3/3 for thoracentesis and bleeding after   3/4 1107 -- -- -- +18 18 1800 DW BERKLEY Tolbert   3/4 1755 0.28 18 -- +2 20 0200 Dw RN   3/5 0500 0.28 20 -- +1 21 1000 D/w RN   3/5 1003 0.43 21 -- -- 21 1600 DW BERKLEY Sanchez   3/5 1357 0.41 21 -- -- 21 2000 DW RN   3/5 2059 0.38 21 -- -- 21 0600  RN   3/6 0411 0.29 21 -- +2 23 1400 D/w RN   3/6  1357 0.4 23 -- -- 23 2200 D/w Summer; pump verified   3/6 2300 0.34 23 -- -- 23 0600 D/w RN   3/7 0647 >1.1 23 -- -- -- Re-draw Questionable results--requested re-draw, instructed RN not to change rate or hold   3/7 0812 0.37 23 -- -- 23 0600 3/8 D/w Didi

## 2021-03-07 NOTE — PLAN OF CARE
Goal Outcome Evaluation:  Plan of Care Reviewed With: patient  Progress: improving  Outcome Summary: Pt continued on levophed gtt throughout the night, despite scheduled midodrine. UOP 1070. CT output 250. Appetite improved after marinol, no complaints of nausea or vomiting. Heparin gtt continued. RA-1L NC. Will continue to monitor.

## 2021-03-07 NOTE — PLAN OF CARE
"Goal Outcome Evaluation:     Progress: improving  Outcome Summary: Levophed off this am and has remained off for shift, BP improving with midodrine. 300 ml dark sanguinous CT output. Patient appetite is improved with marinol, but timing ineffective, as night time dose is after dinner tray arrives. Patient also requested Q8 hour dosing if possible, as it \"only lasts about 8 hours, and when it's gone it's gone.\" Up to bedside commode with assist x 1 but reluctant to do activity as it induces nausea and heartburt. CT chest this evening, waiting for results. On room air most of the shift, using 1L NC while sleeping. UOP has decreased and is very concentrated with strong odor. Encouraging PO intake/fluid.  "

## 2021-03-08 ENCOUNTER — ANESTHESIA EVENT (OUTPATIENT)
Dept: PERIOP | Facility: HOSPITAL | Age: 76
End: 2021-03-08

## 2021-03-08 ENCOUNTER — APPOINTMENT (OUTPATIENT)
Dept: GENERAL RADIOLOGY | Facility: HOSPITAL | Age: 76
End: 2021-03-08

## 2021-03-08 PROBLEM — E87.1 HYPONATREMIA: Status: ACTIVE | Noted: 2021-03-08

## 2021-03-08 PROBLEM — J90 PLEURAL EFFUSION: Status: ACTIVE | Noted: 2021-03-01

## 2021-03-08 LAB
ABO GROUP BLD: NORMAL
ALBUMIN SERPL-MCNC: 2.8 G/DL (ref 3.5–5.2)
ANION GAP SERPL CALCULATED.3IONS-SCNC: 5 MMOL/L (ref 5–15)
BASOPHILS # BLD AUTO: 0.03 10*3/MM3 (ref 0–0.2)
BASOPHILS NFR BLD AUTO: 0.3 % (ref 0–1.5)
BLD GP AB SCN SERPL QL: NEGATIVE
BUN SERPL-MCNC: 11 MG/DL (ref 8–23)
BUN/CREAT SERPL: 19.3 (ref 7–25)
CALCIUM SPEC-SCNC: 8.5 MG/DL (ref 8.6–10.5)
CHLORIDE SERPL-SCNC: 94 MMOL/L (ref 98–107)
CO2 SERPL-SCNC: 30 MMOL/L (ref 22–29)
CREAT SERPL-MCNC: 0.57 MG/DL (ref 0.76–1.27)
DEPRECATED RDW RBC AUTO: 42.8 FL (ref 37–54)
EOSINOPHIL # BLD AUTO: 0.05 10*3/MM3 (ref 0–0.4)
EOSINOPHIL NFR BLD AUTO: 0.5 % (ref 0.3–6.2)
ERYTHROCYTE [DISTWIDTH] IN BLOOD BY AUTOMATED COUNT: 13.2 % (ref 12.3–15.4)
GFR SERPL CREATININE-BSD FRML MDRD: 139 ML/MIN/1.73
GLUCOSE BLDC GLUCOMTR-MCNC: 151 MG/DL (ref 70–130)
GLUCOSE BLDC GLUCOMTR-MCNC: 165 MG/DL (ref 70–130)
GLUCOSE BLDC GLUCOMTR-MCNC: 181 MG/DL (ref 70–130)
GLUCOSE BLDC GLUCOMTR-MCNC: 185 MG/DL (ref 70–130)
GLUCOSE SERPL-MCNC: 181 MG/DL (ref 65–99)
HCT VFR BLD AUTO: 29.8 % (ref 37.5–51)
HGB BLD-MCNC: 9.6 G/DL (ref 13–17.7)
IMM GRANULOCYTES # BLD AUTO: 0.04 10*3/MM3 (ref 0–0.05)
IMM GRANULOCYTES NFR BLD AUTO: 0.4 % (ref 0–0.5)
LAB AP CASE REPORT: NORMAL
LYMPHOCYTES # BLD AUTO: 1 10*3/MM3 (ref 0.7–3.1)
LYMPHOCYTES NFR BLD AUTO: 10.2 % (ref 19.6–45.3)
MAGNESIUM SERPL-MCNC: 1.7 MG/DL (ref 1.6–2.4)
MCH RBC QN AUTO: 28.7 PG (ref 26.6–33)
MCHC RBC AUTO-ENTMCNC: 32.2 G/DL (ref 31.5–35.7)
MCV RBC AUTO: 89.2 FL (ref 79–97)
MONOCYTES # BLD AUTO: 1.29 10*3/MM3 (ref 0.1–0.9)
MONOCYTES NFR BLD AUTO: 13.2 % (ref 5–12)
NEUTROPHILS NFR BLD AUTO: 7.38 10*3/MM3 (ref 1.7–7)
NEUTROPHILS NFR BLD AUTO: 75.4 % (ref 42.7–76)
NRBC BLD AUTO-RTO: 0 /100 WBC (ref 0–0.2)
PATH REPORT.FINAL DX SPEC: NORMAL
PHOSPHATE SERPL-MCNC: 2.7 MG/DL (ref 2.5–4.5)
PLATELET # BLD AUTO: 201 10*3/MM3 (ref 140–450)
PMV BLD AUTO: 10.5 FL (ref 6–12)
POTASSIUM SERPL-SCNC: 4.7 MMOL/L (ref 3.5–5.2)
RBC # BLD AUTO: 3.34 10*6/MM3 (ref 4.14–5.8)
RH BLD: POSITIVE
SODIUM SERPL-SCNC: 129 MMOL/L (ref 136–145)
T&S EXPIRATION DATE: NORMAL
UFH PPP CHRO-ACNC: 0.34 IU/ML (ref 0.3–0.7)
WBC # BLD AUTO: 9.79 10*3/MM3 (ref 3.4–10.8)

## 2021-03-08 PROCEDURE — 83735 ASSAY OF MAGNESIUM: CPT | Performed by: INTERNAL MEDICINE

## 2021-03-08 PROCEDURE — 86900 BLOOD TYPING SEROLOGIC ABO: CPT | Performed by: PHYSICIAN ASSISTANT

## 2021-03-08 PROCEDURE — 82962 GLUCOSE BLOOD TEST: CPT

## 2021-03-08 PROCEDURE — 71045 X-RAY EXAM CHEST 1 VIEW: CPT

## 2021-03-08 PROCEDURE — 86850 RBC ANTIBODY SCREEN: CPT | Performed by: PHYSICIAN ASSISTANT

## 2021-03-08 PROCEDURE — 25010000003 MAGNESIUM SULFATE 4 GM/100ML SOLUTION: Performed by: INTERNAL MEDICINE

## 2021-03-08 PROCEDURE — 99232 SBSQ HOSP IP/OBS MODERATE 35: CPT | Performed by: INTERNAL MEDICINE

## 2021-03-08 PROCEDURE — 97161 PT EVAL LOW COMPLEX 20 MIN: CPT

## 2021-03-08 PROCEDURE — 99222 1ST HOSP IP/OBS MODERATE 55: CPT | Performed by: PHYSICIAN ASSISTANT

## 2021-03-08 PROCEDURE — 63710000001 DRONABINOL PER 2.5 MG: Performed by: INTERNAL MEDICINE

## 2021-03-08 PROCEDURE — 86901 BLOOD TYPING SEROLOGIC RH(D): CPT | Performed by: PHYSICIAN ASSISTANT

## 2021-03-08 PROCEDURE — 80069 RENAL FUNCTION PANEL: CPT | Performed by: INTERNAL MEDICINE

## 2021-03-08 PROCEDURE — 85025 COMPLETE CBC W/AUTO DIFF WBC: CPT | Performed by: INTERNAL MEDICINE

## 2021-03-08 PROCEDURE — 86923 COMPATIBILITY TEST ELECTRIC: CPT

## 2021-03-08 PROCEDURE — 85520 HEPARIN ASSAY: CPT

## 2021-03-08 PROCEDURE — 25010000002 HEPARIN (PORCINE) 25000-0.45 UT/250ML-% SOLUTION

## 2021-03-08 RX ORDER — SODIUM CHLORIDE 0.9 % (FLUSH) 0.9 %
10 SYRINGE (ML) INJECTION EVERY 12 HOURS SCHEDULED
Status: CANCELLED | OUTPATIENT
Start: 2021-03-08

## 2021-03-08 RX ORDER — SODIUM CHLORIDE 9 MG/ML
100 INJECTION, SOLUTION INTRAVENOUS CONTINUOUS
Status: ACTIVE | OUTPATIENT
Start: 2021-03-08 | End: 2021-03-08

## 2021-03-08 RX ORDER — FAMOTIDINE 10 MG/ML
20 INJECTION, SOLUTION INTRAVENOUS ONCE
Status: CANCELLED | OUTPATIENT
Start: 2021-03-08 | End: 2021-03-08

## 2021-03-08 RX ADMIN — MAGNESIUM SULFATE HEPTAHYDRATE 4 G: 40 INJECTION, SOLUTION INTRAVENOUS at 05:14

## 2021-03-08 RX ADMIN — DRONABINOL 5 MG: 2.5 CAPSULE ORAL at 15:04

## 2021-03-08 RX ADMIN — LINAGLIPTIN 5 MG: 5 TABLET, FILM COATED ORAL at 08:15

## 2021-03-08 RX ADMIN — SODIUM CHLORIDE, PRESERVATIVE FREE 10 ML: 5 INJECTION INTRAVENOUS at 10:24

## 2021-03-08 RX ADMIN — DRONABINOL 5 MG: 2.5 CAPSULE ORAL at 05:14

## 2021-03-08 RX ADMIN — HEPARIN SODIUM 23 UNITS/KG/HR: 10000 INJECTION, SOLUTION INTRAVENOUS at 05:15

## 2021-03-08 RX ADMIN — MIDODRINE HYDROCHLORIDE 5 MG: 5 TABLET ORAL at 15:04

## 2021-03-08 RX ADMIN — HEPARIN SODIUM 23 UNITS/KG/HR: 10000 INJECTION, SOLUTION INTRAVENOUS at 19:17

## 2021-03-08 RX ADMIN — SODIUM CHLORIDE, PRESERVATIVE FREE 10 ML: 5 INJECTION INTRAVENOUS at 20:04

## 2021-03-08 RX ADMIN — MIDODRINE HYDROCHLORIDE 5 MG: 5 TABLET ORAL at 05:14

## 2021-03-08 RX ADMIN — SODIUM CHLORIDE 100 ML/HR: 9 INJECTION, SOLUTION INTRAVENOUS at 10:24

## 2021-03-08 RX ADMIN — MIDODRINE HYDROCHLORIDE 5 MG: 5 TABLET ORAL at 20:39

## 2021-03-08 RX ADMIN — SODIUM CHLORIDE 100 ML/HR: 9 INJECTION, SOLUTION INTRAVENOUS at 20:04

## 2021-03-08 NOTE — PROGRESS NOTES
Intensive Care Follow-up     Hospital:  LOS: 7 days   Mr. Danny Alvarez Jr., 75 y.o. male is followed for:   Pleural effusion on left            History of present illness:   75-year-old male admitted on 3/1 with increasing shortness of breath.  He also had significant recent 50 pound weight loss.  Further work-up revealed large bilateral pleural effusions left greater than right.  CT of the abdomen and pelvis revealed a thrombosed portal vein and reticular abnormalities in the right upper quadrant omental fat with concerns of omental metastases.  Echocardiogram revealed a decreased ejection fraction in the range of 41-45%.  There was diastolic dysfunction as well and mild LVH.  He underwent left thoracentesis and subsequent pigtail chest tube placement.  He was transferred to the ICU on the evening of 3/3 after chest tube placement due to concerns over hypotension.  Pleural fluid cytology consistent with adenocarcinoma.      Subjective   Interval History:  Patient stable this morning.  Continues to have fairly significant of output from the left chest tube.  In addition of this CT scan from yesterday continue so hydropneumothorax.  Pleural fluid cytology is consistent with adenocarcinoma.  Patient now off pressors which was turned off yesterday morning.  He has no other complaints at this time.           The patient's past medical, surgical and social history were reviewed and updated in Epic as appropriate.       Objective     Infusions:  heparin, 23 Units/kg/hr, Last Rate: 23 Units/kg/hr (03/08/21 0515)  Pharmacy to Dose Heparin,   sodium chloride, 100 mL/hr, Last Rate: 100 mL/hr (03/08/21 1024)      Medications:  [START ON 3/9/2021] cefuroxime, 1.5 g, Intravenous, On Call to OR  dronabinol, 5 mg, Oral, BID  linagliptin, 5 mg, Oral, Daily  midodrine, 5 mg, Oral, Q8H  sodium chloride, 10 mL, Intravenous, Q12H      I reviewed the patient's medications.    Vital Sign Min/Max for last 24 hours  Temp  Min: 97.1 °F (36.2  °C)  Max: 98.4 °F (36.9 °C)   BP  Min: 89/57  Max: 148/97   Pulse  Min: 73  Max: 114   Resp  Min: 16  Max: 20   SpO2  Min: 88 %  Max: 98 %   Flow (L/min)  Min: 1  Max: 1       Input/Output for last 24 hour shift  03/07 0701 - 03/08 0700  In: 817.8 [P.O.:520; I.V.:297.8]  Out: 1035 [Urine:550]      GENERAL : NAD, conversant  RESPIRATORY/THORAX : normal respiratory effort and no intercostal retractions, Coarse crackles bilaterally  CARDIOVASCULAR : Normal S1/S2, RRR.  No lower ext edema.  GASTROINTESTINAL : Soft, NT/ND. BS x 4 normoactive. No hepatosplenomegaly.  MUSCULOSKELETAL : No cyanosis, clubbing, or ischemia  NEUROLOGICAL: alert and oriented to person, place and time  PSYCHOLOGICAL : Appropriate affect    Results from last 7 days   Lab Units 03/08/21  0349 03/07/21  0310 03/06/21  2357 03/06/21  0411   WBC 10*3/mm3 9.79 15.42*  --  19.94*   HEMOGLOBIN g/dL 9.6* 9.7*  9.7* 9.5* 9.5*   PLATELETS 10*3/mm3 201 230  --  222     Results from last 7 days   Lab Units 03/08/21  0349 03/07/21  0310 03/06/21  2156 03/06/21  0411   SODIUM mmol/L 129* 131*  --  132*   POTASSIUM mmol/L 4.7 4.7  --  4.7   CO2 mmol/L 30.0* 31.0*  --  29.0   BUN mg/dL 11 9  --  13   CREATININE mg/dL 0.57* 0.53*  --  0.55*   MAGNESIUM mg/dL 1.7 2.0  --  1.7   PHOSPHORUS mg/dL 2.7 2.6 2.4* 2.3*   GLUCOSE mg/dL 181* 207*  --  150*     Estimated Creatinine Clearance: 84 mL/min (A) (by C-G formula based on SCr of 0.57 mg/dL (L)).          I reviewed the patient's new clinical results.  I reviewed the patient's new imaging results/reports including actual images and agree with reports.              Imaging Results (Last 24 Hours)     Procedure Component Value Units Date/Time    XR Chest 1 View [[id number]] Collected: 03/08/21 0835     Updated: 03/08/21 1043    Narrative:      EXAMINATION: XR CHEST 1 VW- 03/08/2021     INDICATION: Left effusion, L SBCT; P05-Vefxoip effusion, not elsewhere  classified; R93.5-Abnormal findings on diagnostic imaging of  other  abdominal regions, including retroperitoneum; R06.00-Dyspnea,  unspecified; R77.8-Other specified abnormalities of plasma proteins      COMPARISON: 03/07/2021     FINDINGS: Portable chest reveals heart to be borderline enlarged. There  is no change seen in the pneumothorax on the left. Left chest tube  remains in place. There is a moderate size pleural effusion on the  right. Improvement seen in the pulmonary vascularity in the interval.          Impression:      Improvement seen in the pulmonary vascularity with stable  left pneumothorax. No change in the moderate size right pleural  effusion.     D:  03/08/2021  E:  03/08/2021     This report was finalized on 3/8/2021 10:40 AM by Dr. Julianne Brady MD.       XR Chest 1 View [[id number]] Collected: 03/07/21 0809     Updated: 03/08/21 1037    Narrative:         EXAMINATION: XR CHEST 1 VW - 03/07/2021     INDICATION: N72-Ynqzvpz effusion, not elsewhere classified;  R93.5-Abnormal findings on diagnostic imaging of other abdominal  regions, including retroperitoneum; R06.00-Dyspnea, unspecified;  R77.8-Other specified abnormalities of plasma proteins. Left effusion.     COMPARISON: 03/06/2021     FINDINGS: Portable chest reveals heart to be enlarged. Bilateral pleural  effusions with increased pulmonary vascularity bilaterally. The  pneumothorax on the left is unchanged. The left chest tube remains in  place. Degenerative changes seen within the spine.          Impression:      Stable chest as above.     DICTATED:   03/07/2021  EDITED/ls :   03/07/2021     This report was finalized on 3/8/2021 10:34 AM by Dr. Julianne Brady MD.       XR Chest 1 View [[id number]] Collected: 03/06/21 0839     Updated: 03/08/21 0955    Narrative:         EXAMINATION: XR CHEST 1 VW - 03/06/2021     INDICATION: Y93-Rnmkips effusion, not elsewhere classified;  R93.5-Abnormal findings on diagnostic imaging of other abdominal  regions, including retroperitoneum; R06.00-Dyspnea,  unspecified;  R77.8-Other specified abnormalities of plasma proteins. Left effusion.      COMPARISON: 03/05/2021     FINDINGS: Portable chest reveals heart to be borderline enlarged. There  is a chest tube identified on the left. The appearance of the the lung  fields is stable. There is a small right pleural effusion remaining. The  basilar pneumothorax on the left is stable. Tiny apical component  remains. Degenerative changes seen within the spine with increased  markings seen in the perihilar regions bilaterally with slight  improvement on the left.          Impression:      Slight improvement seen in the ill-defined opacification in  the left perihilar region. The basilar and apical pneumothorax on the  left is stable with left chest tube in place. No change in the right  pleural effusion.     DICTATED:   03/06/2021  EDITED/ls :   03/06/2021     This report was finalized on 3/8/2021 9:52 AM by Dr. Julianne Brady MD.       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  Who presented to [facility] on 3/1/2021.  In which CT the abdomen pelvis revealed a portal vein thrombosis, with concerns for omental metastasis, echo showing EF of 41% and diastolic dysfunction.  He underwent a pigtail catheter placement on 3/3/2021 with  pleural fluid showing adenocarcinoma.  Initially hypotensive, but was weaned off pressors on 3/7/2021 with the addition of midodrine.  CT of the chest from 3/7/2021 showed the left-sided effusion is loculated with continued trap left lung and a hydropneumothorax.  3/4/2021 lower extremity Doppler showed a DVT of the peroneal on the right.  He has continued on heparin drip due to the DVT and portal vein thrombosis.    Patient remained in the ICU for now with an extremely complicated case given his underlying diagnosis of adenocarcinoma likely metastatic, unknown primary as of yet.  With bilateral pleural effusions either due to decompensated systolic heart failure versus malignancy.  Also case is complicated by a DVT and portal vein thrombosis on full anticoagulation.    · We will continue chest tube to suction for now, lung not fully reexpanding.  I will go and asked CT surgery to evaluate the patient for a minimal a left-sided Pleurx.  He may also benefit from a right-sided Pleurx.  I am not sure VATS would be able to provide any significant benefit given the diagnosis of malignancy and the inability to reexpand the left lung, but I will defer to them for decision-making.  · Continue test tube to suction at -20 cm  · Continue heparin drip for now for portal vein thrombosis and DVT, will hold based on surgery recommendations  · Continue midodrine for hypotension likely related to malignancy  · Urine output has been low and hyponatremia, will give a liter fluid over the next 12 hours  · Keep patient in the unit for now.  Once we have a plan from CT surgery regarding any operations can consider downgrade.  · P.o. diet  · Mobilize patient  · Aggressive pulmonary toilet  · A.m. labs    Plan of care and goals reviewed with multidisciplinary/antibiotic stewardship team during rounds.   I discussed the patient's findings and my recommendations with patient and family     High level of risk due to:  illness with threat to  life or bodily function.      Mercy Machuca DO  Pulmonary, Critical care and Sleep Medicine

## 2021-03-08 NOTE — PROGRESS NOTES
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  He still is having fairly significant output from the chest tube and CT surgery has been consulted.  He has been off of pressors.  He says the Marinol seem to help with some of his indigestion, but his appetite is still not great and he is put off by the smell of food.      REVIEW OF SYSTEMS:  A 14 point review of systems was performed and is negative except as noted above.      Objective     Vitals:    03/08/21 0800 03/08/21 0900 03/08/21 1000 03/08/21 1100   BP: 148/97 94/61 91/64    BP Location: Left arm      Patient Position: Sitting      Pulse:  86 73 78   Resp: 18 18 18 18   Temp:       TempSrc:       SpO2: 95% 95% 97% 90%   Weight:       Height:                       Performance Status: 3  General: cachectic appearing male in no acute distress  Neuro: alert and oriented  HEENT: sclerae anicteric, oropharynx clear  Lymphatics: no cervical, supraclavicular, or axillary adenopathy  Cardiovascular: regular rate and rhythm, no murmurs  Lungs: decreased bilateral bases  Abdomen: soft, nontender, nondistended.  No palpable organomegaly  Extremities: no lower extremity edema  Skin: no rashes, lesions, bruising, or petechiae  Psych: mood and affect appropriate        Labs:       WBC   Date Value Ref Range Status   03/08/2021 9.79 3.40 - 10.80 10*3/mm3 Final     RBC   Date Value Ref Range Status   03/08/2021 3.34 (L) 4.14 - 5.80 10*6/mm3  Final     Hemoglobin   Date Value Ref Range Status   03/08/2021 9.6 (L) 13.0 - 17.7 g/dL Final     Hematocrit   Date Value Ref Range Status   03/08/2021 29.8 (L) 37.5 - 51.0 % Final     MCV   Date Value Ref Range Status   03/08/2021 89.2 79.0 - 97.0 fL Final     MCH   Date Value Ref Range Status   03/08/2021 28.7 26.6 - 33.0 pg Final     MCHC   Date Value Ref Range Status   03/08/2021 32.2 31.5 - 35.7 g/dL Final     RDW   Date Value Ref Range Status   03/08/2021 13.2 12.3 - 15.4 % Final     RDW-SD   Date Value Ref Range Status   03/08/2021 42.8 37.0 - 54.0 fl Final     MPV   Date Value Ref Range Status   03/08/2021 10.5 6.0 - 12.0 fL Final     Platelets   Date Value Ref Range Status   03/08/2021 201 140 - 450 10*3/mm3 Final     Neutrophil %   Date Value Ref Range Status   03/08/2021 75.4 42.7 - 76.0 % Final     Lymphocyte %   Date Value Ref Range Status   03/08/2021 10.2 (L) 19.6 - 45.3 % Final     Monocyte %   Date Value Ref Range Status   03/08/2021 13.2 (H) 5.0 - 12.0 % Final     Eosinophil %   Date Value Ref Range Status   03/08/2021 0.5 0.3 - 6.2 % Final     Basophil %   Date Value Ref Range Status   03/08/2021 0.3 0.0 - 1.5 % Final     Immature Grans %   Date Value Ref Range Status   03/08/2021 0.4 0.0 - 0.5 % Final     Neutrophils, Absolute   Date Value Ref Range Status   03/08/2021 7.38 (H) 1.70 - 7.00 10*3/mm3 Final     Lymphocytes, Absolute   Date Value Ref Range Status   03/08/2021 1.00 0.70 - 3.10 10*3/mm3 Final     Monocytes, Absolute   Date Value Ref Range Status   03/08/2021 1.29 (H) 0.10 - 0.90 10*3/mm3 Final     Eosinophils, Absolute   Date Value Ref Range Status   03/08/2021 0.05 0.00 - 0.40 10*3/mm3 Final     Basophils, Absolute   Date Value Ref Range Status   03/08/2021 0.03 0.00 - 0.20 10*3/mm3 Final     Immature Grans, Absolute   Date Value Ref Range Status   03/08/2021 0.04 0.00 - 0.05 10*3/mm3 Final     nRBC   Date Value Ref Range Status   03/08/2021 0.0 0.0 - 0.2 /100 WBC Final       Glucose    Date Value Ref Range Status   03/08/2021 181 (H) 65 - 99 mg/dL Final     BUN   Date Value Ref Range Status   03/08/2021 11 8 - 23 mg/dL Final     Creatinine   Date Value Ref Range Status   03/08/2021 0.57 (L) 0.76 - 1.27 mg/dL Final     Sodium   Date Value Ref Range Status   03/08/2021 129 (L) 136 - 145 mmol/L Final     Potassium   Date Value Ref Range Status   03/08/2021 4.7 3.5 - 5.2 mmol/L Final     Chloride   Date Value Ref Range Status   03/08/2021 94 (L) 98 - 107 mmol/L Final     CO2   Date Value Ref Range Status   03/08/2021 30.0 (H) 22.0 - 29.0 mmol/L Final     Calcium   Date Value Ref Range Status   03/08/2021 8.5 (L) 8.6 - 10.5 mg/dL Final     Albumin   Date Value Ref Range Status   03/08/2021 2.80 (L) 3.50 - 5.20 g/dL Final     eGFR Non  Amer   Date Value Ref Range Status   03/08/2021 139 >60 mL/min/1.73 Final     BUN/Creatinine Ratio   Date Value Ref Range Status   03/08/2021 19.3 7.0 - 25.0 Final     Anion Gap   Date Value Ref Range Status   03/08/2021 5.0 5.0 - 15.0 mmol/L Final       Lab Results   Component Value Date    CEA 0.75 03/05/2021               Assessment/Plan     Danny Alvarez Jr. is a 75 y.o. year old male  with metastatic malignancy with bilateral large effusion, possible omental disease, portal vein thrombosis and lower extremity DVT.     Metastatic adenocarcinoma: CA 19/9 markedly elevated.  Cytology suggests a pancreaticobiliary primary.  I discussed this with the patient.  I think there is limited value in putting him through further imaging or procedures to try to confirm a pancreas or biliary primary.  I think with the current information it is enough to have discussions about treatment options.  We discussed treatment versus supportive care only.  If he were to proceed with treatment I would likely consider Gemzar or Gemzar plus Abraxane.  We reviewed potential side effects of this.  We discussed that the goal of treatment would be to try to help him have as good a quality  of life as possible for as long as possible, but ultimately the disease is not curable.  He says that he will think about this and ask further questions as he has them.    Bilateral pleural effusions with hydropneumothorax: CT surgery has been consulted.  May need Pleurx versus more invasive management.     Portal vein thrombosis/DVT: secondary to metastatic malignancy.  Will need indefinite anticoagulation.      DM: no current tx.                     Anupama Desouza MD  3/8/2021

## 2021-03-08 NOTE — PROGRESS NOTES
Continued Stay Note  [facility]     Patient Name: Danny Alvarez Jr.  MRN: [id number]  Today's Date: 3/8/2021    Admit Date: 3/1/2021    Discharge Plan     Row Name 03/08/21 1253       Plan    Plan  Home    Plan Comments  Spoke with patient at bedside.  Patient still plans to return home upon discharge and denies at this time. CTS has been consulted for possible pleurx catheter placement.  Patient may need home health pending decision on pleurx.  CM will continue to follow.        Discharge Codes    No documentation.       Expected Discharge Date and Time     Expected Discharge Date Expected Discharge Time    Mar 12, 2021             Tayler Roberto RN

## 2021-03-08 NOTE — THERAPY EVALUATION
Patient Name: Danny Alvarez Jr.  : 1945    MRN: [id number]                              Today's Date: 3/8/2021       Admit Date: 3/1/2021    Visit Dx:     ICD-10-CM ICD-9-CM   1. Pleural effusion  J90 511.9   2. Abnormal CT of the abdomen  R93.5 793.6   3. Dyspnea on exertion  R06.00 786.09   4. 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   • Elevated troponin   • Ascites   • Portal vein thrombosis   • Essential hypertension   • Obstructive sleep apnea syndrome   • Type 2 diabetes mellitus without complication (CMS/[facility])   • Acute on chronic combined systolic and diastolic CHF (congestive heart failure) (CMS/[facility])   • Acute deep vein thrombosis (DVT) of right peroneal vein (CMS/[facility])   • Right-sided pleural effusion   • Hyponatremia     Past Medical History:   Diagnosis Date   • Diabetes mellitus (CMS/HCC)    • Hypertension      Past Surgical History:   Procedure Laterality Date   • CLAVICLE SURGERY       General Information     [facility] Name 21 1609          Physical Therapy Time and Intention    Document Type  therapy note (daily note)  -     Mode of Treatment  physical therapy  -     Row Name 21 1609          General Information    Patient Profile Reviewed  yes  -     Prior Level of Function  independent:;all household mobility;community mobility;gait;transfer;bed mobility;ADL's  -     Existing Precautions/Restrictions  fall;other (see comments) chest tube  -     Barriers to Rehab  medically complex  -     Row Name 21 1609          Living Environment    Lives With  spouse  -     Row Name 21 1609          Home Main Entrance    Number of Stairs, Main Entrance  none  -     Stair Railings, Main Entrance  railing on left side (ascending)  -     Row Name 21 1609          Stairs Within Home, Primary    Stairs, Within Home, Primary  14  -     Number of Stairs, Within Home, Primary   other (see comments) 14  -     Stair Railings, Within Home, Primary  railing on left side (ascending)  -     Row Name 03/08/21 1609          Cognition    Orientation Status (Cognition)  oriented x 4  -     Row Name 03/08/21 1609          Safety Issues, Functional Mobility    Safety Issues Affecting Function (Mobility)  safety precaution awareness;safety precautions follow-through/compliance;sequencing abilities  -     Impairments Affecting Function (Mobility)  balance;endurance/activity tolerance  -     Comment, Safety Issues/Impairments (Mobility)  alert, following commands  -       User Key  (r) = Recorded By, (t) = Taken By, (c) = Cosigned By    Initials Name Provider Type     Marco Garrett, PT Physical Therapist        Mobility     Row Name 03/08/21 1611          Bed Mobility    Comment (Bed Mobility)  UIC  -     Row Name 03/08/21 1611          Transfers    Comment (Transfers)  Verbal cues for hand placment  -     Row Name 03/08/21 1611          Sit-Stand Transfer    Sit-Stand [place] (Transfers)  contact guard;verbal cues  -     Assistive Device (Sit-Stand Transfers)  walker, front-wheeled  -     Row Name 03/08/21 1611          Gait/Stairs (Locomotion)    [place] Level (Gait)  contact guard;1 person to manage equipment  -     Assistive Device (Gait)  walker, front-wheeled  -     Distance in Feet (Gait)  230  -     Deviations/Abnormal Patterns (Gait)  bilateral deviations;boo decreased;gait speed decreased;stride length decreased;weight shifting decreased  -     Bilateral Gait Deviations  heel strike decreased  -     Comment (Gait/Stairs)  Pt ambulated with step through pattern and RW, which he does not use at baseline. Will focus on progressing with balance and endurance to return to PLOF.  -       User Key  (r) = Recorded By, (t) = Taken By, (c) = Cosigned By    Initials Name Provider Type    Marco Aguilera, NIKO Physical Therapist        Obj/Interventions      [facility] [place] Name 03/08/21 1612          Range of Motion Comprehensive    General Range of Motion  bilateral lower extremity ROM WFL  -[facility] Name 03/08/21 1612          Strength Comprehensive (MMT)    General Manual Muscle Testing (MMT) Assessment  lower extremity strength deficits identified  -     Comment, General Manual Muscle Testing (MMT) Assessment  BLE grossly 4+/5  -[facility] Name 03/08/21 1612          Balance    Balance Assessment  sitting static balance;sitting dynamic balance;standing static balance;standing dynamic balance  -     Static Sitting Balance  WFL;unsupported;sitting in chair  -     Dynamic Sitting Balance  WFL;unsupported;sitting in chair  -     Static Standing Balance  supported;standing;WFL  -     Dynamic Standing Balance  mild impairment;supported;standing  -     Comment, Balance  no LOB noted  -JH     Row Name 03/08/21 1612          Sensory Assessment (Somatosensory)    Sensory Assessment (Somatosensory)  LE sensation intact  -       User Key  (r) = Recorded By, (t) = Taken By, (c) = Cosigned By    Initials Name Provider Type     Marco Garrett, PT Physical Therapist        Goals/Plan     Row Name 03/08/21 1616          Bed Mobility Goal 1 (PT)    Activity/Assistive Device (Bed Mobility Goal 1, PT)  bed mobility activities, all  -     [place] Level/Cues Needed (Bed Mobility Goal 1, PT)  independent  -     Time Frame (Bed Mobility Goal 1, PT)  short term goal (STG);5 days  -[facility] Name 03/08/21 1616          Transfer Goal 1 (PT)    Activity/Assistive Device (Transfer Goal 1, PT)  sit-to-stand/stand-to-sit;bed-to-chair/chair-to-bed  -     [place] Level/Cues Needed (Transfer Goal 1, PT)  independent;moderate assist (50-74% patient effort);1 person assist  -     Time Frame (Transfer Goal 1, PT)  short term goal (STG);5 days  -[facility] Name 03/08/21 1616          Gait Training Goal 1 (PT)    Activity/Assistive Device (Gait Training Goal 1, PT)  gait  (walking locomotion)  -     [place] Level (Gait Training Goal 1, PT)  standby assist  -     Distance (Gait Training Goal 1, PT)  350 feet  -     Time Frame (Gait Training Goal 1, PT)  long term goal (LTG);1 week  -     Row Name 03/08/21 1616          Stairs Goal 1 (PT)    Activity/Assistive Device (Stairs Goal 1, PT)  stairs, all skills  -     [place] Level/Cues Needed (Stairs Goal 1, PT)  standby assist  -     Number of Stairs (Stairs Goal 1, PT)  14  -     Time Frame (Stairs Goal 1, PT)  long term goal (LTG);1 week  -       User Key  (r) = Recorded By, (t) = Taken By, (c) = Cosigned By    Initials Name Provider Type     Marco Garrett, PT Physical Therapist        Clinical Impression     Row Name 03/08/21 1613          Pain    Additional Documentation  Pain Scale: Numbers Pre/Post-Treatment (Group)  -[facility] Name 03/08/21 1613          Pain Scale: Numbers Pre/Post-Treatment    Pretreatment Pain Rating  0/10 - no pain  -     Posttreatment Pain Rating  0/10 - no pain  -     Pre/Posttreatment Pain Comment  denied  -     Pain Intervention(s)  Repositioned  -     Row Name 03/08/21 1613          Plan of Care Review    Plan of Care Reviewed With  patient  -     Progress  no change PT IE  -     Outcome Summary  PT eval complete. Pt presents with a decline in functional mobility from baseline warranting IPPT to promote return to PLOF. Pt ambulated 230 feet with RW and CGA. Will progress with distance and stair training with plans to d/c home with assist and HHPT.  -     Row Name 03/08/21 1613          Therapy Assessment/Plan (PT)    Patient/Family Therapy Goals Statement (PT)  improve mobility  -     Rehab Potential (PT)  good, to achieve stated therapy goals  -     Criteria for Skilled Interventions Met (PT)  yes;skilled treatment is necessary  -     Predicted Duration of Therapy Intervention (PT)  1 week  -[facility] Name 03/08/21 1613          Vital Signs    Pre  Systolic BP Rehab  106  -JH     Pre Treatment Diastolic BP  69  -JH     Post Systolic BP Rehab  116  -JH     Post Treatment Diastolic BP  83  -JH     Pretreatment Heart Rate (beats/min)  80  -JH     Intratreatment Heart Rate (beats/min)  95  -JH     Posttreatment Heart Rate (beats/min)  92  -JH     Pre SpO2 (%)  93  -JH     O2 Delivery Pre Treatment  room air  -     Intra SpO2 (%)  93  -JH     O2 Delivery Intra Treatment  room air  -JH     Post SpO2 (%)  97  -JH     O2 Delivery Post Treatment  room air  -     Pre Patient Position  Sitting  -     Intra Patient Position  Standing  -     Post Patient Position  Sitting  -     Row Name 03/08/21 1613          Positioning and Restraints    Pre-Treatment Position  sitting in chair/recliner  -JH     Post Treatment Position  chair  -JH     In Chair  notified nsg;reclined;call light within reach;encouraged to call for assist;exit alarm on;legs elevated  -       User Key  (r) = Recorded By, (t) = Taken By, (c) = Cosigned By    Initials Name Provider Type    Marco Aguilera, PT Physical Therapist        Outcome Measures     Row Name 03/08/21 1617          How much help from another person do you currently need...    Turning from your back to your side while in flat bed without using bedrails?  3  -JH     Moving from lying on back to sitting on the side of a flat bed without bedrails?  3  -JH     Moving to and from a bed to a chair (including a wheelchair)?  3  -JH     Standing up from a chair using your arms (e.g., wheelchair, bedside chair)?  3  -JH     Climbing 3-5 steps with a railing?  3  -JH     To walk in hospital room?  3  -     AM-PAC 6 Clicks Score (PT)  18  -     Row Name 03/08/21 1617          Functional Assessment    Outcome Measure Options  AM-PAC 6 Clicks Basic Mobility (PT)  -       User Key  (r) = Recorded By, (t) = Taken By, (c) = Cosigned By    Initials Name Provider Type    Marco Aguilera, NIKO Physical Therapist        Physical Therapy  Education                 Title: PT OT SLP Therapies (Done)     Topic: Physical Therapy (Done)     Point: Mobility training (Done)     Learning Progress Summary           Patient Acceptance, E,TB, VU by  at 3/8/2021 1618    Comment: edu on PT services, POC, HEP, d/c plans                   Point: Home exercise program (Done)     Learning Progress Summary           Patient Acceptance, E,TB, VU by  at 3/8/2021 1618    Comment: edu on PT services, POC, HEP, d/c plans                   Point: Body mechanics (Done)     Learning Progress Summary           Patient Acceptance, E,TB, VU by  at 3/8/2021 1618    Comment: edu on PT services, POC, HEP, d/c plans                   Point: Precautions (Done)     Learning Progress Summary           Patient Acceptance, E,TB, VU by  at 3/8/2021 1618    Comment: edu on PT services, POC, HEP, d/c plans                               User Key     Initials Effective Dates Name Provider Type [facility] 09/22/20 -  Marco Garrett, NIKO Physical Therapist PT              PT Recommendation and Plan  Planned Therapy Interventions (PT): balance training, bed mobility training, gait training, home exercise program, patient/family education, stretching, strengthening, stair training, ROM (range of motion), postural re-education, transfer training  Plan of Care Reviewed With: patient  Progress: no change (PT IE)  Outcome Summary: PT eval complete. Pt presents with a decline in functional mobility from baseline warranting IPPT to promote return to PLOF. Pt ambulated 230 feet with RW and CGA. Will progress with distance and stair training with plans to d/c home with assist and HHPT.     Time Calculation:   PT Charges     Row Name 03/08/21 1618             Time Calculation    Start Time  1540  -      PT Received On  03/08/21  -      PT Goal Re-Cert Due Date  03/18/21  -        User Key  (r) = Recorded By, (t) = Taken By, (c) = Cosigned By    Initials Name Provider Type     Tami  Marco, PT Physical Therapist        Therapy Charges for Today     Code Description Service Date Service Provider Modifiers Qty    99277854639 HC PT EVAL LOW COMPLEXITY 4 3/8/2021 Marco Garrett, PT GP 1          PT G-Codes  Outcome Measure Options: AM-PAC 6 Clicks Basic Mobility (PT)  AM-PAC 6 Clicks Score (PT): 18    Marco Garrett, PT  3/8/2021

## 2021-03-08 NOTE — PLAN OF CARE
Goal Outcome Evaluation:     Progress: no change  Outcome Summary: VSS, BP remains stable with midodrine. CT output dark sanguinous, decreased from prior shifts. Marinol dosages adjusted to promote appetite, patient reports no nausea between doses but PO intake remains poor. Oncology d/w patient today high likelihood of stage 4 pancreatic cancer with poor prognosis, patient to consider treatment options. Planned right pleurx catheter scheduled for tomorrow with CT surgery.

## 2021-03-08 NOTE — PROGRESS NOTES
Clinical Nutrition     Reason for Visit: MDR, Identified at risk by screening criteria    Patient Name: Danny Alvarez Jr.  YOB: 1945  MRN: [id number]  Date of Encounter: 03/08/21 14:48 EST  Admission date: 3/1/2021    Nutrition Assessment   Admission Problem List:  ARF  Malignant pleural effusion  Ascites  Metastatic Adenocarcimoma  Portal vein thrombosis  DVT  Elevated troponin  A/C CHF    s/p L. thoracentesis 3-1-21    s/p CT guided L. chest tube placement 3-3-21    h/o recent dental infection  PMH:   He  has a past medical history of Diabetes mellitus (CMS/HCC) and Hypertension.  PSxH:  He  has a past surgical history that includes Clavicle surgery.      Reported/Observed/Food/Nutrition Related History:     Pt sitting up in chair, on room air,  appears depressed    Pt reports poor appetite, states that marinol helps a bit, he does not like the smell of hot foods, dislikes boost GC, willing to try ensure HP    Per RN: pt has concentrated, foul smelling urine    IVF's ordered per MD    Anthropometrics   Height: 70in  Weight: 164lb  BMI: 23.5  BMI classification: Normal: 18.5-24.9kg/m2     UBW: 165-172lb    Weight change: 50lb over past 5 years attributed to increased physical activity            Last 15 Recorded Weights  View Complete Flowsheet  Weight Weight (kg) Weight (lbs) Weight Method   3/3/2021 74.39 kg 164 lb -   3/1/2021 74.753 kg 164 lb 12.8 oz Standing scale   3/1/2021 74.844 kg 165 lb Stated   3/1/2021 74.844 kg 165 lb Stated         Labs reviewed     Results from last 7 days   Lab Units 03/08/21  0349 03/04/21  0658   SODIUM mmol/L 129* 135*   POTASSIUM mmol/L 4.7 4.2   CHLORIDE mmol/L 94* 98   CO2 mmol/L 30.0* 28.0   BUN mg/dL 11 21   CREATININE mg/dL 0.57* 0.66*   CALCIUM mg/dL 8.5* 9.2   BILIRUBIN mg/dL  --  0.8   ALK PHOS U/L  --  84   ALT (SGPT) U/L  --  13   AST (SGOT) U/L  --  65*   GLUCOSE mg/dL 181* 207*       Results from last 7 days   Lab Units 03/08/21  1219  03/08/21  0742 03/07/21  2033 03/07/21  1604 03/07/21  1110 03/07/21  0752   GLUCOSE mg/dL 181* 185* 215* 202* 194* 178*     Lab Results   Lab Value Date/Time    HGBA1C 9.20 (H) 03/02/2021 0409       Medications reviewed   Pertinent: abx, marinol, heparin, NS@100ml/hr    Intake/Ouptut 24 hrs (7:00AM - 6:59 AM)     Intake & Output (last day)       03/07 0701 - 03/08 0700 03/08 0701 - 03/09 0700    P.O. 520     I.V. (mL/kg) 297.8 (4)     Total Intake(mL/kg) 817.8 (11)     Urine (mL/kg/hr) 550 (0.3) 300 (0.5)    Stool 0     Chest Tube 485 100    Total Output 1035 400    Net -217.2 -400          Urine Unmeasured Occurrence 2 x     Stool Unmeasured Occurrence 1 x                GI: wnl    SKIN: stage 1 coccyx    Current Nutrition Prescription     PO: Diet Regular; Cardiac, Consistent Carbohydrate  NPO Diet    Boost GC 3x/day    Intake: 29% of 6 meals    Nutrition Diagnosis   Date: 3-4-21, 3-8  Problem Inadequate oral intake   Etiology Per Clinical Status   Signs/Symptoms Poor appetite, ascites, wt loss     Nutrition Intervention   1.  Follow treatment progress, Advised available snacks, Interview for preferences, Menu provided, Menu adjusted, Adjusted supplement    Ensure HP 3x/day    Goal:   General: Nutrition support treatment  PO: Increase intake    Additional goals: f/u later for DM Education when more appropriate    Monitoring/Evaluation:   Per protocol, I&O, PO intake, Pertinent labs, Weight, Skin status, GI status, Symptoms, Hemodynamic stability  Francheska Acevedo, RENNY  Time Spent: 45min

## 2021-03-08 NOTE — PLAN OF CARE
Goal Outcome Evaluation:  Plan of Care Reviewed With: patient  Progress: improving  Outcome Summary: BP continues to be stable off of the levophed. Heparin gtt continues. UOP continues to decrease-encouraging adequate oral intake. CT output 155. No complaints of nausea or vomiting. Mag 1.7- replacement started.

## 2021-03-08 NOTE — PROGRESS NOTES
Chart reviewed.  Cytology suggests a pancreaticobiliary primary.  CA 19-9 markedly elevated.  Oncology does not recommend further imaging or procedures to confirm this.  They have discussed treatment options with him.  He will need indefinite anticoagulation managed per oncology.      Will sign off.  Please call for any questions or concerns.

## 2021-03-08 NOTE — PLAN OF CARE
Goal Outcome Evaluation:  Plan of Care Reviewed With: patient  Progress: no change (PT IE)  Outcome Summary: PT eval complete. Pt presents with a decline in functional mobility from baseline warranting IPPT to promote return to PLOF. Pt ambulated 130 feet with RW and CGA. Will progress with distance and stair training with plans to d/c home with assist and HHPT.

## 2021-03-08 NOTE — CONSULTS
CTS Consult    Patient Care Team:  Jef Beckham MD as PCP - General (Internal Medicine)      Reason for Consult:  Bilateral malignant pleural effusion    HPI  Patient is a 75 y.o. male with a history of hypertension, and diabetes mellitus who presented to [facility] ED with complaints of progressively worsening shortness of breath for approximately 2 months.  CT of chest revealed bilateral pleural effusions, left > right and patient underwent left CT guided thoracentesis with 2000 ml of cloudy fluid removed and subsequent CT guided left pigtail placement on 3/3 secondary to quick re-accumulation of fluid.  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  Patient was also found to have portal vein thrombosis, and omental abnormalities concerning for GI primary source of malignancy.  Patient reports cough and denies current shortness of breath.      Review of Systems  Constitutional: Negative for malaise/fatigue.  Negative for chills, fever, night sweats and weight loss.  HENT: Negative for hearing loss, odynophagia and sore throat.    Cardiovascular: Negative for claudication and dyspnea on exertion.  Negative for chest pain, leg swelling, orthopnea and palpitations.  Respiratory: Positive for shortness of breath and cough  Endocrine:  Negative for cold intolerance, heat intolerance, polydipsia, polyphagia and polyuria.  Hematologic/Lymphatic: Negative for easy bruising/bleeding.  Musculoskeletal: Negative for joint pain, joint swelling and myalgias.  Gastrointestinal: Negative for abdominal pain, constipation, diarrhea, hematemesis, hematochezia, melena, nausea and vomiting.  Genitourinary: Negative for dysuria, frequency and hematuria.  Neurological: Negative for focal weakness, headaches, numbness and seizures.  Psychiatric/Behavioral: Negative for depression and suicidal ideas.  The patient is not nervous/anxious.    All other systems are reviewed and are negative.    History  Past Medical  History:   Diagnosis Date   • Diabetes mellitus (CMS/HCC)    • Hypertension      Past Surgical History:   Procedure Laterality Date   • CLAVICLE SURGERY       History reviewed. No pertinent family history.  Social History     Tobacco Use   • Smoking status: Never Smoker   • Smokeless tobacco: Never Used   Substance Use Topics   • Alcohol use: Yes     Comment: 1 glass of wine per week   • Drug use: Defer     Medications Prior to Admission   Medication Sig Dispense Refill Last Dose   • enalapril (VASOTEC) 10 MG tablet Take 10 mg by mouth Daily.   Unknown at Unknown time   • metFORMIN (GLUCOPHAGE) 500 MG tablet Take 500 mg by mouth 2 (Two) Times a Day With Meals.   Unknown at Unknown time     Allergies:  Patient has no known allergies.    Objective    Vital Signs  Temp:  [97.1 °F (36.2 °C)-98.4 °F (36.9 °C)] 97.8 °F (36.6 °C)  Heart Rate:  [] 75  Resp:  [16-20] 18  BP: ()/(57-97) 148/97    Physical Exam:  General Appearance: alert, appears stated age and cooperative  Head: normocephalic, without obvious abnormality and atraumatic  Throat: gums healthy and no oral lesions  Neck: no adenopathy, suppple, trachea midline, no thyromegaly, no carotid bruit and no JVD  Lungs: decreased lung sounds left side, respirations regular, respirations even and respirations unlabored  Heart: regular rhythm & normal rate, normal S1, S2, no murmur, no pablo, no rub and no click  Abdomen: normal bowel sounds, no masses and soft non-tender  Extremities: moves extremities well and no edema  Pulses: Pulses palpable and equal bilaterally  Skin: no bleeding, bruising or rash  Neurologic: Mental Status orientated to person, place, time and situation          Data Review:  Results from last 7 days   Lab Units 03/08/21  0349   WBC 10*3/mm3 9.79   HEMOGLOBIN g/dL 9.6*   HEMATOCRIT % 29.8*   PLATELETS 10*3/mm3 201     Results from last 7 days   Lab Units 03/08/21  0349   SODIUM mmol/L 129*   POTASSIUM mmol/L 4.7   CHLORIDE mmol/L 94*    CO2 mmol/L 30.0*   BUN mg/dL 11   CREATININE mg/dL 0.57*   GLUCOSE mg/dL 181*   CALCIUM mg/dL 8.5*     Coagulation: No results found for: INR, APTT  Cardiac markers:   Results from last 7 days   Lab Units 03/02/21  0813   TROPONIN T ng/mL 0.121*     ABGs:       Invalid input(s): PO2      Imaging Results (Last 72 Hours)     Procedure Component Value Units Date/Time    XR Chest 1 View [[id number]] Collected: 03/08/21 0835     Updated: 03/08/21 0843    Narrative:      EXAMINATION: XR CHEST 1 VW- 03/08/2021     INDICATION: Left effusion, L SBCT; X61-Igetajf effusion, not elsewhere  classified; R93.5-Abnormal findings on diagnostic imaging of other  abdominal regions, including retroperitoneum; R06.00-Dyspnea,  unspecified; R77.8-Other specified abnormalities of plasma proteins      COMPARISON: 03/07/2021     FINDINGS: Portable chest reveals heart to be borderline enlarged. There  is no change seen in the pneumothorax on the left. Left chest tube  remains in place. There is a moderate size pleural effusion on the  right. Improvement seen in the pulmonary vascularity in the interval.          Impression:      Improvement seen in the pulmonary vascularity with stable  left pneumothorax. No change in the moderate size right pleural  effusion.     D:  03/08/2021  E:  03/08/2021          CT Chest Without Contrast Diagnostic [[id number]] Resulted: 03/07/21 1817     Updated: 03/07/21 1835    XR Chest 1 View [[id number]] Collected: 03/07/21 0809     Updated: 03/07/21 1233    Narrative:         EXAMINATION: XR CHEST 1 VW - 03/07/2021     INDICATION: G75-Iilzays effusion, not elsewhere classified;  R93.5-Abnormal findings on diagnostic imaging of other abdominal  regions, including retroperitoneum; R06.00-Dyspnea, unspecified;  R77.8-Other specified abnormalities of plasma proteins. Left effusion.     COMPARISON: 03/06/2021     FINDINGS: Portable chest reveals heart to be enlarged. Bilateral pleural  effusions with increased  pulmonary vascularity bilaterally. The  pneumothorax on the left is unchanged. The left chest tube remains in  place. Degenerative changes seen within the spine.          Impression:      Stable chest as above.     DICTATED:   03/07/2021  EDITED/ls :   03/07/2021          XR Chest 1 View [[id number]] Collected: 03/06/21 0839     Updated: 03/06/21 1313    Narrative:         EXAMINATION: XR CHEST 1 VW - 03/06/2021     INDICATION: E58-Zgxcwcn effusion, not elsewhere classified;  R93.5-Abnormal findings on diagnostic imaging of other abdominal  regions, including retroperitoneum; R06.00-Dyspnea, unspecified;  R77.8-Other specified abnormalities of plasma proteins. Left effusion.      COMPARISON: 03/05/2021     FINDINGS: Portable chest reveals heart to be borderline enlarged. There  is a chest tube identified on the left. The appearance of the the lung  fields is stable. There is a small right pleural effusion remaining. The  basilar pneumothorax on the left is stable. Tiny apical component  remains. Degenerative changes seen within the spine with increased  markings seen in the perihilar regions bilaterally with slight  improvement on the left.          Impression:      [               ] seen in the ill-defined opacification in  the left perihilar region. The basilar and apical pneumothorax on the  left is stable with left chest tube in place. No change in the right  pleural effusion.     DICTATED:   03/06/2021  EDITED/ls :   03/06/2021          XR Chest 1 View [[id number]] Collected: 03/05/21 0844     Updated: 03/06/21 0001    Narrative:      EXAMINATION: XR CHEST 1 VW- 03/05/2021     INDICATION: Left effusion, L SBCT; H94-Qrtdxvd effusion, not elsewhere  classified; R93.5-Abnormal findings on diagnostic imaging of other  abdominal regions, including retroperitoneum; R06.00-Dyspnea,  unspecified; R77.8-Other specified abnormalities of plasma proteins     COMPARISON: 03/04/2021     FINDINGS: Small bore left pleural drain  remains in place. There is  persistent left basilar reexpansion pneumothorax which appears a little  improved. Masslike opacity of the left midlung may represent the  partially atelectatic lower lung. No new left-sided pulmonary  parenchymal disease is seen.     On the right, there is persistent patchy opacity of the right perihilar  region and right base unchanged. Right upper lung remains clear. There  is probably a small residual right pleural effusion.       Impression:      Little overall change in appearance of the lungs, with  persistent perihilar disease and very small right pleural effusion. Left  basilar pneumothorax appears a little improved.      D:  03/05/2021  E:  03/05/2021     This report was finalized on 3/5/2021 11:58 PM by Dr. Michael Dennis MD.             Echo 3/3/21:   Interpretation Summary    · Left ventricular ejection fraction appears to be 41 - 45%. Left ventricular systolic function is moderately decreased.  · Left ventricular diastolic function is consistent with (grade II w/high LAP) pseudonormalization.  · The left ventricular cavity is mildly dilated.  · Left ventricular wall thickness is consistent with mild concentric hypertrophy.  · Moderate tricuspid valve regurgitation is present.  · Estimated right ventricular systolic pressure from tricuspid regurgitation is markedly elevated (>55 mmHg).  · Moderate mitral valve regurgitation is present.  · There is a left pleural effusion.  · There is a small (<1cm) pericardial effusion.           Assessment:        Pleural effusion on left    Elevated troponin    Ascites    Portal vein thrombosis    Essential hypertension    Type 2 diabetes mellitus without complication (CMS/HCC)    Acute on chronic combined systolic and diastolic CHF (congestive heart failure) (CMS/HCC)    Acute deep vein thrombosis (DVT) of right peroneal vein (CMS/HCC)    Left CT output:  485 ml/ 24 hours    Plan:  Patient will be considered for right Pleurx catheter placement  vs bilateral Pleurx placement.      Kate Montanez PA-C  03/08/21  09:49 EST

## 2021-03-09 ENCOUNTER — APPOINTMENT (OUTPATIENT)
Dept: GENERAL RADIOLOGY | Facility: HOSPITAL | Age: 76
End: 2021-03-09

## 2021-03-09 ENCOUNTER — ANESTHESIA (OUTPATIENT)
Dept: PERIOP | Facility: HOSPITAL | Age: 76
End: 2021-03-09

## 2021-03-09 LAB
ALBUMIN SERPL-MCNC: 2.6 G/DL (ref 3.5–5.2)
ANION GAP SERPL CALCULATED.3IONS-SCNC: 5 MMOL/L (ref 5–15)
BASOPHILS # BLD AUTO: 0.05 10*3/MM3 (ref 0–0.2)
BASOPHILS NFR BLD AUTO: 0.7 % (ref 0–1.5)
BUN SERPL-MCNC: 9 MG/DL (ref 8–23)
BUN/CREAT SERPL: 16.4 (ref 7–25)
CALCIUM SPEC-SCNC: 8.5 MG/DL (ref 8.6–10.5)
CHLORIDE SERPL-SCNC: 96 MMOL/L (ref 98–107)
CO2 SERPL-SCNC: 30 MMOL/L (ref 22–29)
CREAT SERPL-MCNC: 0.55 MG/DL (ref 0.76–1.27)
DEPRECATED RDW RBC AUTO: 43.8 FL (ref 37–54)
EOSINOPHIL # BLD AUTO: 0.18 10*3/MM3 (ref 0–0.4)
EOSINOPHIL NFR BLD AUTO: 2.5 % (ref 0.3–6.2)
ERYTHROCYTE [DISTWIDTH] IN BLOOD BY AUTOMATED COUNT: 13.3 % (ref 12.3–15.4)
GFR SERPL CREATININE-BSD FRML MDRD: 145 ML/MIN/1.73
GLUCOSE BLDC GLUCOMTR-MCNC: 133 MG/DL (ref 70–130)
GLUCOSE BLDC GLUCOMTR-MCNC: 146 MG/DL (ref 70–130)
GLUCOSE BLDC GLUCOMTR-MCNC: 147 MG/DL (ref 70–130)
GLUCOSE BLDC GLUCOMTR-MCNC: 181 MG/DL (ref 70–130)
GLUCOSE SERPL-MCNC: 152 MG/DL (ref 65–99)
HCT VFR BLD AUTO: 31 % (ref 37.5–51)
HGB BLD-MCNC: 9.8 G/DL (ref 13–17.7)
IMM GRANULOCYTES # BLD AUTO: 0.04 10*3/MM3 (ref 0–0.05)
IMM GRANULOCYTES NFR BLD AUTO: 0.6 % (ref 0–0.5)
LYMPHOCYTES # BLD AUTO: 0.93 10*3/MM3 (ref 0.7–3.1)
LYMPHOCYTES NFR BLD AUTO: 12.9 % (ref 19.6–45.3)
MAGNESIUM SERPL-MCNC: 1.9 MG/DL (ref 1.6–2.4)
MCH RBC QN AUTO: 28.4 PG (ref 26.6–33)
MCHC RBC AUTO-ENTMCNC: 31.6 G/DL (ref 31.5–35.7)
MCV RBC AUTO: 89.9 FL (ref 79–97)
MONOCYTES # BLD AUTO: 1.08 10*3/MM3 (ref 0.1–0.9)
MONOCYTES NFR BLD AUTO: 15 % (ref 5–12)
NEUTROPHILS NFR BLD AUTO: 4.91 10*3/MM3 (ref 1.7–7)
NEUTROPHILS NFR BLD AUTO: 68.3 % (ref 42.7–76)
NRBC BLD AUTO-RTO: 0 /100 WBC (ref 0–0.2)
PHOSPHATE SERPL-MCNC: 3.5 MG/DL (ref 2.5–4.5)
PLATELET # BLD AUTO: 230 10*3/MM3 (ref 140–450)
PMV BLD AUTO: 10.2 FL (ref 6–12)
POTASSIUM SERPL-SCNC: 4.7 MMOL/L (ref 3.5–5.2)
RBC # BLD AUTO: 3.45 10*6/MM3 (ref 4.14–5.8)
SODIUM SERPL-SCNC: 131 MMOL/L (ref 136–145)
UFH PPP CHRO-ACNC: 0.21 IU/ML (ref 0.3–0.7)
UFH PPP CHRO-ACNC: 0.25 IU/ML (ref 0.3–0.7)
WBC # BLD AUTO: 7.19 10*3/MM3 (ref 3.4–10.8)

## 2021-03-09 PROCEDURE — 32551 INSERTION OF CHEST TUBE: CPT | Performed by: PHYSICIAN ASSISTANT

## 2021-03-09 PROCEDURE — 32550 INSERT PLEURAL CATH: CPT | Performed by: PHYSICIAN ASSISTANT

## 2021-03-09 PROCEDURE — 0W9B30Z DRAINAGE OF LEFT PLEURAL CAVITY WITH DRAINAGE DEVICE, PERCUTANEOUS APPROACH: ICD-10-PCS | Performed by: THORACIC SURGERY (CARDIOTHORACIC VASCULAR SURGERY)

## 2021-03-09 PROCEDURE — C1729 CATH, DRAINAGE: HCPCS | Performed by: THORACIC SURGERY (CARDIOTHORACIC VASCULAR SURGERY)

## 2021-03-09 PROCEDURE — 82962 GLUCOSE BLOOD TEST: CPT

## 2021-03-09 PROCEDURE — 85520 HEPARIN ASSAY: CPT

## 2021-03-09 PROCEDURE — 25010000002 CEFUROXIME: Performed by: PHYSICIAN ASSISTANT

## 2021-03-09 PROCEDURE — 97116 GAIT TRAINING THERAPY: CPT

## 2021-03-09 PROCEDURE — 63710000001 DRONABINOL PER 2.5 MG: Performed by: PHYSICIAN ASSISTANT

## 2021-03-09 PROCEDURE — 71045 X-RAY EXAM CHEST 1 VIEW: CPT

## 2021-03-09 PROCEDURE — 97166 OT EVAL MOD COMPLEX 45 MIN: CPT

## 2021-03-09 PROCEDURE — 97110 THERAPEUTIC EXERCISES: CPT

## 2021-03-09 PROCEDURE — 25010000002 KETOROLAC TROMETHAMINE PER 15 MG: Performed by: PHYSICIAN ASSISTANT

## 2021-03-09 PROCEDURE — 25010000002 FENTANYL CITRATE (PF) 100 MCG/2ML SOLUTION: Performed by: NURSE ANESTHETIST, CERTIFIED REGISTERED

## 2021-03-09 PROCEDURE — 0WH933Z INSERTION OF INFUSION DEVICE INTO RIGHT PLEURAL CAVITY, PERCUTANEOUS APPROACH: ICD-10-PCS | Performed by: THORACIC SURGERY (CARDIOTHORACIC VASCULAR SURGERY)

## 2021-03-09 PROCEDURE — 63710000001 DRONABINOL PER 2.5 MG: Performed by: INTERNAL MEDICINE

## 2021-03-09 PROCEDURE — 80069 RENAL FUNCTION PANEL: CPT | Performed by: INTERNAL MEDICINE

## 2021-03-09 PROCEDURE — 85025 COMPLETE CBC W/AUTO DIFF WBC: CPT | Performed by: INTERNAL MEDICINE

## 2021-03-09 PROCEDURE — 99232 SBSQ HOSP IP/OBS MODERATE 35: CPT | Performed by: INTERNAL MEDICINE

## 2021-03-09 PROCEDURE — 25010000002 PROPOFOL 10 MG/ML EMULSION: Performed by: NURSE ANESTHETIST, CERTIFIED REGISTERED

## 2021-03-09 PROCEDURE — 83735 ASSAY OF MAGNESIUM: CPT | Performed by: INTERNAL MEDICINE

## 2021-03-09 RX ORDER — PROPOFOL 10 MG/ML
VIAL (ML) INTRAVENOUS AS NEEDED
Status: DISCONTINUED | OUTPATIENT
Start: 2021-03-09 | End: 2021-03-09 | Stop reason: SURG

## 2021-03-09 RX ORDER — KETOROLAC TROMETHAMINE 15 MG/ML
15 INJECTION, SOLUTION INTRAMUSCULAR; INTRAVENOUS EVERY 6 HOURS PRN
Status: DISCONTINUED | OUTPATIENT
Start: 2021-03-09 | End: 2021-03-12 | Stop reason: HOSPADM

## 2021-03-09 RX ORDER — FENTANYL CITRATE 50 UG/ML
INJECTION, SOLUTION INTRAMUSCULAR; INTRAVENOUS AS NEEDED
Status: DISCONTINUED | OUTPATIENT
Start: 2021-03-09 | End: 2021-03-09 | Stop reason: SURG

## 2021-03-09 RX ORDER — PROPOFOL 10 MG/ML
VIAL (ML) INTRAVENOUS CONTINUOUS PRN
Status: DISCONTINUED | OUTPATIENT
Start: 2021-03-09 | End: 2021-03-09 | Stop reason: SURG

## 2021-03-09 RX ORDER — HYDROMORPHONE HYDROCHLORIDE 1 MG/ML
0.5 INJECTION, SOLUTION INTRAMUSCULAR; INTRAVENOUS; SUBCUTANEOUS
Status: DISCONTINUED | OUTPATIENT
Start: 2021-03-09 | End: 2021-03-09 | Stop reason: HOSPADM

## 2021-03-09 RX ORDER — MIDODRINE HYDROCHLORIDE 10 MG/1
10 TABLET ORAL EVERY 8 HOURS SCHEDULED
Status: DISCONTINUED | OUTPATIENT
Start: 2021-03-09 | End: 2021-03-12 | Stop reason: HOSPADM

## 2021-03-09 RX ORDER — SODIUM CHLORIDE, SODIUM LACTATE, POTASSIUM CHLORIDE, CALCIUM CHLORIDE 600; 310; 30; 20 MG/100ML; MG/100ML; MG/100ML; MG/100ML
9 INJECTION, SOLUTION INTRAVENOUS CONTINUOUS
Status: DISCONTINUED | OUTPATIENT
Start: 2021-03-09 | End: 2021-03-11

## 2021-03-09 RX ORDER — MAGNESIUM HYDROXIDE 1200 MG/15ML
LIQUID ORAL AS NEEDED
Status: DISCONTINUED | OUTPATIENT
Start: 2021-03-09 | End: 2021-03-09 | Stop reason: HOSPADM

## 2021-03-09 RX ORDER — EPHEDRINE SULFATE 50 MG/ML
INJECTION, SOLUTION INTRAVENOUS AS NEEDED
Status: DISCONTINUED | OUTPATIENT
Start: 2021-03-09 | End: 2021-03-09 | Stop reason: SURG

## 2021-03-09 RX ORDER — LIDOCAINE HYDROCHLORIDE 10 MG/ML
INJECTION, SOLUTION EPIDURAL; INFILTRATION; INTRACAUDAL; PERINEURAL AS NEEDED
Status: DISCONTINUED | OUTPATIENT
Start: 2021-03-09 | End: 2021-03-09 | Stop reason: SURG

## 2021-03-09 RX ORDER — ONDANSETRON 2 MG/ML
4 INJECTION INTRAMUSCULAR; INTRAVENOUS ONCE AS NEEDED
Status: DISCONTINUED | OUTPATIENT
Start: 2021-03-09 | End: 2021-03-09 | Stop reason: HOSPADM

## 2021-03-09 RX ORDER — MIDAZOLAM HYDROCHLORIDE 1 MG/ML
1 INJECTION INTRAMUSCULAR; INTRAVENOUS
Status: DISCONTINUED | OUTPATIENT
Start: 2021-03-09 | End: 2021-03-09 | Stop reason: HOSPADM

## 2021-03-09 RX ORDER — SODIUM CHLORIDE 0.9 % (FLUSH) 0.9 %
10 SYRINGE (ML) INJECTION AS NEEDED
Status: DISCONTINUED | OUTPATIENT
Start: 2021-03-09 | End: 2021-03-09 | Stop reason: HOSPADM

## 2021-03-09 RX ORDER — HEPARIN SODIUM 10000 [USP'U]/100ML
24 INJECTION, SOLUTION INTRAVENOUS
Status: DISCONTINUED | OUTPATIENT
Start: 2021-03-10 | End: 2021-03-11

## 2021-03-09 RX ORDER — FENTANYL CITRATE 50 UG/ML
50 INJECTION, SOLUTION INTRAMUSCULAR; INTRAVENOUS
Status: DISCONTINUED | OUTPATIENT
Start: 2021-03-09 | End: 2021-03-09 | Stop reason: HOSPADM

## 2021-03-09 RX ORDER — LIDOCAINE HYDROCHLORIDE 10 MG/ML
0.5 INJECTION, SOLUTION EPIDURAL; INFILTRATION; INTRACAUDAL; PERINEURAL ONCE AS NEEDED
Status: DISCONTINUED | OUTPATIENT
Start: 2021-03-09 | End: 2021-03-09 | Stop reason: HOSPADM

## 2021-03-09 RX ORDER — MIDAZOLAM HYDROCHLORIDE 1 MG/ML
0.5 INJECTION INTRAMUSCULAR; INTRAVENOUS
Status: DISCONTINUED | OUTPATIENT
Start: 2021-03-09 | End: 2021-03-09 | Stop reason: HOSPADM

## 2021-03-09 RX ORDER — FAMOTIDINE 20 MG/1
20 TABLET, FILM COATED ORAL ONCE
Status: COMPLETED | OUTPATIENT
Start: 2021-03-09 | End: 2021-03-09

## 2021-03-09 RX ADMIN — MIDODRINE HYDROCHLORIDE 10 MG: 10 TABLET ORAL at 22:10

## 2021-03-09 RX ADMIN — EPHEDRINE SULFATE 10 MG: 50 INJECTION, SOLUTION INTRAVENOUS at 16:17

## 2021-03-09 RX ADMIN — SODIUM CHLORIDE, PRESERVATIVE FREE 10 ML: 5 INJECTION INTRAVENOUS at 08:20

## 2021-03-09 RX ADMIN — MIDODRINE HYDROCHLORIDE 5 MG: 5 TABLET ORAL at 06:10

## 2021-03-09 RX ADMIN — PROPOFOL 150 MCG/KG/MIN: 10 INJECTION, EMULSION INTRAVENOUS at 15:52

## 2021-03-09 RX ADMIN — CEFUROXIME 1.5 G: 1.5 INJECTION, POWDER, FOR SOLUTION INTRAVENOUS at 15:54

## 2021-03-09 RX ADMIN — SODIUM CHLORIDE, POTASSIUM CHLORIDE, SODIUM LACTATE AND CALCIUM CHLORIDE 9 ML/HR: 600; 310; 30; 20 INJECTION, SOLUTION INTRAVENOUS at 14:15

## 2021-03-09 RX ADMIN — MIDODRINE HYDROCHLORIDE 10 MG: 10 TABLET ORAL at 08:19

## 2021-03-09 RX ADMIN — DRONABINOL 5 MG: 2.5 CAPSULE ORAL at 08:20

## 2021-03-09 RX ADMIN — DRONABINOL 5 MG: 2.5 CAPSULE ORAL at 16:00

## 2021-03-09 RX ADMIN — LINAGLIPTIN 5 MG: 5 TABLET, FILM COATED ORAL at 08:19

## 2021-03-09 RX ADMIN — LIDOCAINE HYDROCHLORIDE 50 MG: 10 INJECTION, SOLUTION EPIDURAL; INFILTRATION; INTRACAUDAL; PERINEURAL at 15:52

## 2021-03-09 RX ADMIN — ACETAMINOPHEN 650 MG: 325 TABLET ORAL at 20:14

## 2021-03-09 RX ADMIN — FENTANYL CITRATE 100 MCG: 50 INJECTION, SOLUTION INTRAMUSCULAR; INTRAVENOUS at 15:52

## 2021-03-09 RX ADMIN — SODIUM CHLORIDE, PRESERVATIVE FREE 10 ML: 5 INJECTION INTRAVENOUS at 14:15

## 2021-03-09 RX ADMIN — PROPOFOL 50 MG: 10 INJECTION, EMULSION INTRAVENOUS at 15:52

## 2021-03-09 RX ADMIN — FAMOTIDINE 20 MG: 20 TABLET ORAL at 14:15

## 2021-03-09 RX ADMIN — KETOROLAC TROMETHAMINE 15 MG: 15 INJECTION, SOLUTION INTRAMUSCULAR; INTRAVENOUS at 23:08

## 2021-03-09 NOTE — PROGRESS NOTES
Intensive Care Follow-up     Hospital:  LOS: 8 days   Mr. Danny Alvarez Jr., 75 y.o. male is followed for:   Pleural effusion on left            History of present illness:   75-year-old male admitted on 3/1 with increasing shortness of breath.  He also had significant recent 50 pound weight loss.  Further work-up revealed large bilateral pleural effusions left greater than right.  CT of the abdomen and pelvis revealed a thrombosed portal vein and reticular abnormalities in the right upper quadrant omental fat with concerns of omental metastases.  Echocardiogram revealed a decreased ejection fraction in the range of 41-45%.  There was diastolic dysfunction as well and mild LVH.  He underwent left thoracentesis and subsequent pigtail chest tube placement.  He was transferred to the ICU on the evening of 3/3 after chest tube placement due to concerns over hypotension.  Pleural fluid cytology consistent with adenocarcinoma.      Subjective   Interval History:  Patient doing well this morning.  He has no acute complaints.  Now off pressors for 2 days.  Plan to have a Pleurx placed this afternoon.  He has no other complaints at this time.  Continues on heparin.         The patient's past medical, surgical and social history were reviewed and updated in Epic as appropriate.       Objective     Infusions:  heparin, 24 Units/kg/hr, Last Rate: 24 Units/kg/hr (03/09/21 0633)  Pharmacy to Dose Heparin,       Medications:  cefuroxime, 1.5 g, Intravenous, On Call to OR  dronabinol, 5 mg, Oral, BID  linagliptin, 5 mg, Oral, Daily  midodrine, 10 mg, Oral, Q8H  sodium chloride, 10 mL, Intravenous, Q12H      I reviewed the patient's medications.    Vital Sign Min/Max for last 24 hours  Temp  Min: 97.5 °F (36.4 °C)  Max: 98.2 °F (36.8 °C)   BP  Min: 86/56  Max: 124/84   Pulse  Min: 70  Max: 103   Resp  Min: 16  Max: 18   SpO2  Min: 89 %  Max: 98 %   Flow (L/min)  Min: 2  Max: 2       Input/Output for last 24 hour shift  03/08 0701 -  03/09 0700  In: 1997.5 [I.V.:1997.5]  Out: 1125 [Urine:1025]      GENERAL : NAD, conversant  RESPIRATORY/THORAX : normal respiratory effort and no intercostal retractions, Coarse crackles bilaterally  CARDIOVASCULAR : Normal S1/S2, RRR.  No lower ext edema.  GASTROINTESTINAL : Soft, NT/ND. BS x 4 normoactive. No hepatosplenomegaly.  MUSCULOSKELETAL : No cyanosis, clubbing, or ischemia  NEUROLOGICAL: alert and oriented to person, place and time  PSYCHOLOGICAL : Appropriate affect    Results from last 7 days   Lab Units 03/09/21  0407 03/08/21  0349 03/07/21  0310   WBC 10*3/mm3 7.19 9.79 15.42*   HEMOGLOBIN g/dL 9.8* 9.6* 9.7*  9.7*   PLATELETS 10*3/mm3 230 201 230     Results from last 7 days   Lab Units 03/09/21 0407 03/08/21  0349 03/07/21  0310   SODIUM mmol/L 131* 129* 131*   POTASSIUM mmol/L 4.7 4.7 4.7   CO2 mmol/L 30.0* 30.0* 31.0*   BUN mg/dL 9 11 9   CREATININE mg/dL 0.55* 0.57* 0.53*   MAGNESIUM mg/dL 1.9 1.7 2.0   PHOSPHORUS mg/dL 3.5 2.7 2.6   GLUCOSE mg/dL 152* 181* 207*     Estimated Creatinine Clearance: 84 mL/min (A) (by C-G formula based on SCr of 0.55 mg/dL (L)).          I reviewed the patient's new clinical results.  I reviewed the patient's new imaging results/reports including actual images and agree with reports.              Imaging Results (Last 24 Hours)     Procedure Component Value Units Date/Time    XR Chest 1 View [[id number]] Collected: 03/09/21 0825     Updated: 03/09/21 0848    Narrative:      EXAMINATION: XR CHEST 1 VW- 03/09/2021     INDICATION: Left effusion, L SBCT; R81-Yvlrfrv effusion, not elsewhere  classified; R93.5-Abnormal findings on diagnostic imaging of other  abdominal regions, including retroperitoneum; R06.00-Dyspnea,  unspecified; R77.8-Other specified abnormalities of plasma proteins      COMPARISON: Chest x-ray 03/08/2021     FINDINGS: Left small bore chest tube in place with persistent layering  opacifications and effusion left hemithorax along with a left  apical  predominant stable to slightly increased small to moderate volume left  pneumothorax. Right pleural effusion and opacification similar.          Impression:      Similar appearance of left layering pleural effusion with a  left apical predominant pneumothorax demonstrating potential mild  increase in size from prior comparison.     D:  03/09/2021  E:  03/09/2021                Assessment/Plan   Impression        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    Elevated troponin    Ascites    Portal vein thrombosis    Essential hypertension    Type 2 diabetes mellitus without complication (CMS/HCC)    Acute on chronic combined systolic and diastolic CHF (congestive heart failure) (CMS/HCC)    Acute deep vein thrombosis (DVT) of right peroneal vein (CMS/HCC)    Right-sided pleural effusion    Hyponatremia       Plan        75-year-old male with a past medical history sniffing for hypertension, diabetes mellitus, systolic heart failure (EF 41%), and portal vein thrombosis.  Who presented to [facility] on 3/1/2021.  In which CT the abdomen pelvis revealed a portal vein thrombosis, with concerns for omental metastasis, echo showing EF of 41% and diastolic dysfunction.  He underwent a pigtail catheter placement on 3/3/2021 with pleural fluid showing adenocarcinoma.  Initially hypotensive, but was weaned off pressors on 3/7/2021 with the addition of midodrine.  CT of the chest from 3/7/2021 showed the left-sided effusion is loculated with continued trap left lung and a hydropneumothorax.  3/4/2021 lower extremity Doppler showed a DVT of the peroneal on the right.  He has continued on heparin drip due to the DVT and portal vein thrombosis.     Patient remained in the ICU for now with an extremely complicated case given his underlying diagnosis of adenocarcinoma likely metastatic, unknown primary as of yet.  With bilateral pleural effusions either due to decompensated systolic heart  failure versus malignancy.  Also case is complicated by a DVT and portal vein thrombosis on full anticoagulation.     · Left-sided chest tube with only 100 cc out over last 24 hours, planning for Pleurx catheter placement on the right today.  Patient may need a left-sided Pleurx as well in the future.  · Continue test tube to suction at -20 cm  · Continue heparin drip for now for portal vein thrombosis and DVT, will hold based on surgery recommendations  · We will go ahead and increase midodrine to 10 mg 3 times daily  · IV fluid given the last 24 hours, will hold on further IV fluids.  · Patient likely to have Pleurx placed today, consider downgrading this afternoon.  · P.o. diet  · Mobilize patient  · Aggressive pulmonary toilet  · A.m. labs    Plan of care and goals reviewed with multidisciplinary/antibiotic stewardship team during rounds.   I discussed the patient's findings and my recommendations with patient and nursing staff       Mercy Machuca, DO  Pulmonary, Critical care and Sleep Medicine

## 2021-03-09 NOTE — ANESTHESIA POSTPROCEDURE EVALUATION
Patient: Danny Alvarez Jr.    Procedure Summary     Date: 03/09/21 Room / Location: [facility] OR 01 / [facility]BR[facility]    Anesthesia Start: 1544 Anesthesia Stop:     Procedure: RIGHT PLEURX CATHETER INSERTION WITH DRAINAGE OF PLEURAL EFFUSION LEFT CHEST TUBE INSERTION (Right ) Diagnosis:       Pleural effusion      (Pleural effusion [J90])    Surgeons: Richy Reyes MD Provider: Poli Sexton MD    Anesthesia Type: MAC ASA Status: 3          Anesthesia Type: MAC    Vitals  Vitals Value Taken Time   BP 86/60 03/09/21 1639   Temp     Pulse 73 03/09/21 1641   Resp     SpO2 100 % 03/09/21 1641   Vitals shown include unvalidated device data.        Post Anesthesia Care and Evaluation    Patient location during evaluation: PACU  Patient participation: complete - patient participated  Level of consciousness: awake  Pain score: 0  Pain management: adequate  Airway patency: patent  Anesthetic complications: No anesthetic complications  PONV Status: none  Cardiovascular status: acceptable and stable  Respiratory status: nasal cannula, unassisted, acceptable and spontaneous ventilation  Hydration status: acceptable

## 2021-03-09 NOTE — PLAN OF CARE
Goal Outcome Evaluation:  Plan of Care Reviewed With: patient  Progress: improving  Outcome Summary: Pt demonstrated ability to progress forward ambulation distance to 300 total ft with RW, SBA. Continues to be limited by decreased functional endurance and slight SOA with activity, but maintained O2 sats >88% on RA and had good motivation to progress. Recommend d/c home with assist.

## 2021-03-09 NOTE — OP NOTE
Operative Report  Danny Alvarez Jr.  [id number]  1945    Preop Diagnosis: Malignant right pleural effusion, left pneumothorax and pleural effusion        Postop Diagnosis same        Procedure: #1 right Pleurx catheter placement #2 left chest tube placement        Surgeons: Richy Reyes        Assistant: Andrés Tsai  Assistant: Helga Baldwin PA-C; Nikhil Ochoa PA; Weaver, Rachel, PA-C was responsible for performing the following activities: Retraction, Suction, Suturing, Closing and Placing Dressing and their skilled assistance was necessary for the success of this case.       Operative Findings:         Description: The patient was brought to the operating room and monitored anesthesia.  The patient's right chest was tilted up slightly.  He was prepped and draped in usual sterile fashion.  A introducer needle was introduced in the right chest.  A wire was inserted.  At this time the entrance and exit of the Pleurx catheter was developed using small transverse incision.  The catheter was pulled through the exit wound at this time dilators were passed over the wire and the introducer cath catheter allowed the catheter to be placed in the pleural cavity without difficulty.  It was sutured in place with 2-0 silk.  The small skin incision was closed with 3-0 Monocryl.  There was approximately 2500 cc of serous fluid removed from the chest by suction prior to capping the Pleurx catheter.        At this time the left chest was prepped and draped.  Small transverse incision was made approximately the seventh interspace in the mid axillary line.  Proceeded placed a #28 chest tube after the chest cavity had been entered with a Sherri clamp.  It was sutured in place with 0 silk.  A sterile dressing was applied to both the Pleurx as well as the chest tube.  It was connected to a Pleur-evac.  Patient tolerated procedure without difficulty.      EBL: Less than 25 cc      Please note that  portions of this note were completed with a voice recognition program. Efforts were made to edit the dictations, but words may be mistranscribed      Richy Reyes MD  03/09/21 16:58 EST

## 2021-03-09 NOTE — ANESTHESIA PREPROCEDURE EVALUATION
Anesthesia Evaluation                  Airway   Mallampati: II  Dental      Pulmonary    (+) pleural effusion,   Cardiovascular     (+) hypertension, CHF , DVT,       Neuro/Psych  GI/Hepatic/Renal/Endo    (+)   diabetes mellitus,     Musculoskeletal     Abdominal    Substance History      OB/GYN          Other                        Anesthesia Plan    ASA 3     MAC     intravenous induction     Anesthetic plan, all risks, benefits, and alternatives have been provided, discussed and informed consent has been obtained with: patient.

## 2021-03-09 NOTE — PROGRESS NOTES
Clinical Nutrition     Multidisciplinary Rounds      Patient Name: Danny Alvarez Jr.  Date of Encounter: 03/09/21 09:51 EST  MRN: [id number]  Admission date: 3/1/2021    LATRICE. RENNY to continue to follow per protocol.     npo for pleurx catheter    Current diet: NPO Diet    Intervention:  Follow treatment plan  Care plan reviewed    Follow up:   Per protocol      Francheska Acevedo RD  09:51 EST  Time: 10min

## 2021-03-09 NOTE — THERAPY TREATMENT NOTE
Patient Name: Danny Alvarez Jr.  : 1945    MRN: [id number]                              Today's Date: 3/9/2021       Admit Date: 3/1/2021    Visit Dx:     ICD-10-CM ICD-9-CM   1. Pleural effusion  J90 511.9   2. Abnormal CT of the abdomen  R93.5 793.6   3. Dyspnea on exertion  R06.00 786.09   4. 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   • Elevated troponin   • Ascites   • Portal vein thrombosis   • Essential hypertension   • Obstructive sleep apnea syndrome   • Type 2 diabetes mellitus without complication (CMS/HCC)   • Acute on chronic combined systolic and diastolic CHF (congestive heart failure) (CMS/HCC)   • Acute deep vein thrombosis (DVT) of right peroneal vein (CMS/HCC)   • Right-sided pleural effusion   • Hyponatremia     Past Medical History:   Diagnosis Date   • Diabetes mellitus (CMS/HCC)    • Hypertension      Past Surgical History:   Procedure Laterality Date   • CLAVICLE SURGERY       General Information     Row Name 21 1151          Physical Therapy Time and Intention    Document Type  therapy note (daily note)  -     Mode of Treatment  physical therapy  -     Row Name 21 1151          General Information    Existing Precautions/Restrictions  fall;other (see comments) L chest tube  -     Row Name 21 1151          Cognition    Orientation Status (Cognition)  oriented x 4  -       User Key  (r) = Recorded By, (t) = Taken By, (c) = Cosigned By    Initials Name Provider Type    LS Patti Vazquez PT Physical Therapist        Mobility     Row Name 21 1151          Bed Mobility    Comment (Bed Mobility)  UIC  -     Row Name 21 1151          Sit-Stand Transfer    Sit-Stand [place] (Transfers)  contact guard;verbal cues  -     Assistive Device (Sit-Stand Transfers)  walker, front-wheeled  -     Row Name 21 1151          Gait/Stairs (Locomotion)     [place] Level (Gait)  standby assist;verbal cues  -     Assistive Device (Gait)  walker, front-wheeled  -     Distance in Feet (Gait)  300  -     Deviations/Abnormal Patterns (Gait)  bilateral deviations;gait speed decreased  -     Comment (Gait/Stairs)  1 brief standing rest break due to fatigue/SOA.  -       User Key  (r) = Recorded By, (t) = Taken By, (c) = Cosigned By    Initials Name Provider Type    Patti Lee, PT Physical Therapist        Obj/Interventions     [facility] Name 03/09/21 1152          Motor Skills    Therapeutic Exercise  knee;ankle  -[facility] Name 03/09/21 1152          Knee (Therapeutic Exercise)    Knee (Therapeutic Exercise)  strengthening exercise  -     Knee Strengthening (Therapeutic Exercise)  bilateral;LAQ (long arc quad);marching while seated;sitting;10 repetitions  -[facility] Name 03/09/21 1152          Ankle (Therapeutic Exercise)    Ankle (Therapeutic Exercise)  AROM (active range of motion)  -     Ankle AROM (Therapeutic Exercise)  bilateral;dorsiflexion;plantarflexion;10 repetitions;sitting  -[facility] Name 03/09/21 1152          Balance    Static Sitting Balance  WFL;sitting in chair  -     Static Standing Balance  WFL;supported;standing  -       User Key  (r) = Recorded By, (t) = Taken By, (c) = Cosigned By    Initials Name Provider Type    Patti Lee, PT Physical Therapist        Goals/Plan    No documentation.       Clinical Impression     [facility] Name 03/09/21 1155          Pain Scale: Numbers Pre/Post-Treatment    Pretreatment Pain Rating  0/10 - no pain  -     Posttreatment Pain Rating  0/10 - no pain  -LS     Row Name 03/09/21 1155          Plan of Care Review    Plan of Care Reviewed With  patient  -     Progress  improving  -     Outcome Summary  Pt demonstrated ability to progress forward ambulation distance to 300 total ft with RW, SBA. Continues to be limited by decreased functional endurance and slight SOA with activity, but  maintained O2 sats >88% on RA and had good motivation to progress. Recommend d/c home with assist.  -LS     Row Name 03/09/21 1155          Vital Signs    Pre Systolic BP Rehab  99  -LS     Pre Treatment Diastolic BP  71  -LS     Pretreatment Heart Rate (beats/min)  86  -LS     Pre SpO2 (%)  95  -LS     O2 Delivery Pre Treatment  room air  -LS     Intra SpO2 (%)  88  -LS     O2 Delivery Intra Treatment  room air  -LS     Post SpO2 (%)  94  -LS     O2 Delivery Post Treatment  room air  -LS     Pre Patient Position  Sitting  -LS     Intra Patient Position  Standing  -LS     Post Patient Position  Sitting  -LS     Row Name 03/09/21 1155          Positioning and Restraints    Pre-Treatment Position  sitting in chair/recliner  -LS     Post Treatment Position  chair  -LS     In Chair  notified nsg;reclined;call light within reach;encouraged to call for assist;exit alarm on;heels elevated;legs elevated;waffle cushion  -LS       User Key  (r) = Recorded By, (t) = Taken By, (c) = Cosigned By    Initials Name Provider Type    Patti Lee, PT Physical Therapist        Outcome Measures     Row Name 03/09/21 1159          How much help from another person do you currently need...    Turning from your back to your side while in flat bed without using bedrails?  4  -LS     Moving from lying on back to sitting on the side of a flat bed without bedrails?  3  -LS     Moving to and from a bed to a chair (including a wheelchair)?  3  -LS     Standing up from a chair using your arms (e.g., wheelchair, bedside chair)?  3  -LS     Climbing 3-5 steps with a railing?  3  -LS     To walk in hospital room?  3  -LS     AM-PAC 6 Clicks Score (PT)  19  -LS       User Key  (r) = Recorded By, (t) = Taken By, (c) = Cosigned By    Initials Name Provider Type    Patti Lee, PT Physical Therapist        Physical Therapy Education                 Title: PT OT SLP Therapies (In Progress)     Topic: Physical Therapy (Done)     Point:  Mobility training (Done)     Learning Progress Summary           Patient Acceptance, E,D, VU,DU by  at 3/9/2021 1200    Acceptance, E,TB, VU by  at 3/8/2021 1618    Comment: edu on PT services, POC, HEP, d/c plans                   Point: Home exercise program (Done)     Learning Progress Summary           Patient Acceptance, E,D, VU,DU by  at 3/9/2021 1200    Acceptance, E,TB, VU by  at 3/8/2021 1618    Comment: edu on PT services, POC, HEP, d/c plans                   Point: Body mechanics (Done)     Learning Progress Summary           Patient Acceptance, E,D, VU,DU by  at 3/9/2021 1200    Acceptance, E,TB, VU by  at 3/8/2021 1618    Comment: edu on PT services, POC, HEP, d/c plans                   Point: Precautions (Done)     Learning Progress Summary           Patient Acceptance, E,D, VU,DU by  at 3/9/2021 1200    Acceptance, E,TB, VU by  at 3/8/2021 1618    Comment: edu on PT services, POC, HEP, d/c plans                               User Key     Initials Effective Dates Name Provider Type Discipline     06/19/15 -  Patti Vazquez, PT Physical Therapist PT     09/22/20 -  Marco Garrett, PT Physical Therapist PT              PT Recommendation and Plan     Plan of Care Reviewed With: patient  Progress: improving  Outcome Summary: Pt demonstrated ability to progress forward ambulation distance to 300 total ft with RW, SBA. Continues to be limited by decreased functional endurance and slight SOA with activity, but maintained O2 sats >88% on RA and had good motivation to progress. Recommend d/c home with assist.     Time Calculation:   PT Charges     Row Name 03/09/21 1200             Time Calculation    Start Time  0950  -      PT Received On  03/09/21  -         Time Calculation- PT    Total Timed Code Minutes- PT  25 minute(s)  -         Timed Charges    79503 - PT Therapeutic Exercise Minutes  5  -      80776 - Gait Training Minutes   16  -      67800 - PT Therapeutic Activity  Minutes  4  -LS        User Key  (r) = Recorded By, (t) = Taken By, (c) = Cosigned By    Initials Name Provider Type     Patti Vazquez, PT Physical Therapist        Therapy Charges for Today     Code Description Service Date Service Provider Modifiers Qty    45420593381  PT THER PROC EA 15 MIN 3/9/2021 Patti Vazquez, PT GP 1    [id number]  GAIT TRAINING EA 15 MIN 3/9/2021 Patti Vazquez, PT GP 1          PT G-Codes  Outcome Measure Options: AM-PAC 6 Clicks Daily Activity (OT)  AM-PAC 6 Clicks Score (PT): 19  AM-PAC 6 Clicks Score (OT): 18    Patti Vazquez, PT  3/9/2021

## 2021-03-09 NOTE — PLAN OF CARE
Goal Outcome Evaluation:   Pt rested through the night.  VSS.  2LNC on for sleep to maintain sats >93%.  UOP ~500.  Afebrile.

## 2021-03-09 NOTE — PLAN OF CARE
Problem: Adult Inpatient Plan of Care  Goal: Plan of Care Review  Outcome: Ongoing, Progressing  Flowsheets (Taken 3/9/2021 4624)  Progress: no change  Plan of Care Reviewed With: patient  Outcome Summary: Pt had right pleurex tube placed, left mediastinal ct placed.  Posterior CT dc'd. UOP adequate.  Hep gtt to resume tomorrow 3/10/21. Room air.  VSS.   Goal Outcome Evaluation:

## 2021-03-09 NOTE — THERAPY EVALUATION
Patient Name: Danny Alvarez Jr.  : 1945    MRN: [id number]                              Today's Date: 3/9/2021       Admit Date: 3/1/2021    Visit Dx:     ICD-10-CM ICD-9-CM   1. Pleural effusion  J90 511.9   2. Abnormal CT of the abdomen  R93.5 793.6   3. Dyspnea on exertion  R06.00 786.09   4. 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   • Elevated troponin   • Ascites   • Portal vein thrombosis   • Essential hypertension   • Obstructive sleep apnea syndrome   • Type 2 diabetes mellitus without complication (CMS/HCC)   • Acute on chronic combined systolic and diastolic CHF (congestive heart failure) (CMS/HCC)   • Acute deep vein thrombosis (DVT) of right peroneal vein (CMS/HCC)   • Right-sided pleural effusion   • Hyponatremia     Past Medical History:   Diagnosis Date   • Diabetes mellitus (CMS/HCC)    • Hypertension      Past Surgical History:   Procedure Laterality Date   • CLAVICLE SURGERY       General Information     Row Name 21          OT Time and Intention    Document Type  evaluation  -MA     Mode of Treatment  occupational therapy  -MA     Row Name 21          General Information    Patient Profile Reviewed  yes  -MA     Prior Level of Function  independent:;ADL's;transfer;all household mobility;community mobility  -MA     Existing Precautions/Restrictions  fall;other (see comments) L chest tube  -MA     Barriers to Rehab  medically complex  -MA     Row Name 21          Living Environment    Lives With  spouse  -MA     Row Name 21          Home Main Entrance    Number of Stairs, Main Entrance  none  -MA     Row Name 21          Stairs Within Home, Primary    Number of Stairs, Within Home, Primary  other (see comments) 14  -MA     Stair Railings, Within Home, Primary  railing on left side (ascending)  -MA     Row Name 21          Cognition     Orientation Status (Cognition)  oriented x 4  -MA     Row Name 03/09/21 0836          Safety Issues, Functional Mobility    Safety Issues Affecting Function (Mobility)  safety precaution awareness;safety precautions follow-through/compliance;sequencing abilities  -MA     Impairments Affecting Function (Mobility)  balance;endurance/activity tolerance;strength  -MA       User Key  (r) = Recorded By, (t) = Taken By, (c) = Cosigned By    Initials Name Provider Type    Ginny Chicas OT Occupational Therapist          Mobility/ADL's     Row Name 03/09/21 0838          Bed Mobility    Bed Mobility  supine-sit  -MA     Supine-Sit [place] (Bed Mobility)  standby assist;verbal cues  -MA     Assistive Device (Bed Mobility)  bed rails;head of bed elevated  -MA     Row Name 03/09/21 0838          Transfers    Transfers  sit-stand transfer;bed-chair transfer  -MA     Bed-Chair [place] (Transfers)  contact guard;verbal cues  -MA     Assistive Device (Bed-Chair Transfers)  -- No AD, gait belt  -MA     Sit-Stand [place] (Transfers)  contact guard;verbal cues  -MA     Row Name 03/09/21 0838          Sit-Stand Transfer    Assistive Device (Sit-Stand Transfers)  -- No AD, gait belt  -MA     Row Name 03/09/21 0838          Functional Mobility    Functional Mobility- Comment  Deferred to PT  -MA     Row Name 03/09/21 0838          Activities of Daily Living    BADL Assessment/Intervention  lower body dressing  -MA     Row Name 03/09/21 0838          Lower Body Dressing Assessment/Training    [place] Level (Lower Body Dressing)  don;socks;dependent (less than 25% patient effort)  -MA     Position (Lower Body Dressing)  supine  -MA       User Key  (r) = Recorded By, (t) = Taken By, (c) = Cosigned By    Initials Name Provider Type    Ginny Chicas OT Occupational Therapist        Obj/Interventions     Row Name 03/09/21 0839          Sensory Assessment (Somatosensory)    Sensory Assessment (Somatosensory)   UE sensation intact  -MA     Row Name 03/09/21 0839          Vision Assessment/Intervention    Visual Impairment/Limitations  WFL  -MA     Row Name 03/09/21 0839          Range of Motion Comprehensive    General Range of Motion  bilateral upper extremity ROM WFL  -MA     Row Name 03/09/21 0839          Strength Comprehensive (MMT)    General Manual Muscle Testing (MMT) Assessment  upper extremity strength deficits identified  -MA     Comment, General Manual Muscle Testing (MMT) Assessment  BUE grossly 4+/5  -MA     Row Name 03/09/21 0839          Balance    Balance Assessment  sitting static balance;standing dynamic balance  -MA     Static Sitting Balance  WFL;unsupported;sitting, edge of bed  -MA     Dynamic Standing Balance  mild impairment;standing;unsupported  -MA     Balance Interventions  sit to stand  -MA       User Key  (r) = Recorded By, (t) = Taken By, (c) = Cosigned By    Initials Name Provider Type    Ginny Chicas OT Occupational Therapist        Goals/Plan     Row Name 03/09/21 0842          Transfer Goal 1 (OT)    Activity/Assistive Device (Transfer Goal 1, OT)  sit-to-stand/stand-to-sit;bed-to-chair/chair-to-bed;toilet;walker, rolling  -MA     [place] Level/Cues Needed (Transfer Goal 1, OT)  standby assist  -MA     Time Frame (Transfer Goal 1, OT)  long term goal (LTG);10 days  -MA     Progress/Outcome (Transfer Goal 1, OT)  goal ongoing  -MA     Row Name 03/09/21 0842          Dressing Goal 1 (OT)    Activity/Device (Dressing Goal 1, OT)  lower body dressing don/doff socks w/ AAD  -MA     [place]/Cues Needed (Dressing Goal 1, OT)  contact guard assist  -MA     Time Frame (Dressing Goal 1, OT)  long term goal (LTG);10 days  -MA     Progress/Outcome (Dressing Goal 1, OT)  goal ongoing  -MA     Row Name 03/09/21 0842          Grooming Goal 1 (OT)    Activity/Device (Grooming Goal 1, OT)  hair care;oral care;wash face, hands standing sinkside  -MA     [place] (Grooming Goal 1, OT)   contact guard assist;verbal cues required;set-up required  -MA     Time Frame (Grooming Goal 1, OT)  long term goal (LTG);10 days  -MA     Progress/Outcome (Grooming Goal 1, OT)  goal ongoing  -MA       User Key  (r) = Recorded By, (t) = Taken By, (c) = Cosigned By    Initials Name Provider Type    Ginny Chicas, OT Occupational Therapist        Clinical Impression     Row Name 03/09/21 0839          Pain Assessment    Additional Documentation  Pain Scale: Numbers Pre/Post-Treatment (Group)  -MA     Row Name 03/09/21 0839          Pain Scale: Numbers Pre/Post-Treatment    Pretreatment Pain Rating  0/10 - no pain  -MA     Posttreatment Pain Rating  0/10 - no pain  -MA     Row Name 03/09/21 0839          Plan of Care Review    Plan of Care Reviewed With  patient  -MA     Outcome Summary  OT eval complete. Pt presents w/ decreased activity tolerance, strength, and balance limiting his ADL independence. Pt SBA for bed mobility, CGA for STS and bed-to-chair transfer. Pt would benefit from continued skilled OT services to address current functional deficits and return to PLOF. Recommend home w/ A & HHPT at discharge.  -MA     Row Name 03/09/21 0839          Therapy Assessment/Plan (OT)    Rehab Potential (OT)  good, to achieve stated therapy goals  -MA     Criteria for Skilled Therapeutic Interventions Met (OT)  yes;skilled treatment is necessary  -MA     Therapy Frequency (OT)  daily  -MA     Row Name 03/09/21 0839          Therapy Plan Review/Discharge Plan (OT)    Anticipated Discharge Disposition (OT)  home with assist;home with home health  -MA     Row Name 03/09/21 0839          Vital Signs    Pre Systolic BP Rehab  89  -MA     Pre Treatment Diastolic BP  64  -MA     Post Systolic BP Rehab  94  -MA     Post Treatment Diastolic BP  70  -MA     Pretreatment Heart Rate (beats/min)  82  -MA     Posttreatment Heart Rate (beats/min)  74  -MA     Pre SpO2 (%)  97  -MA     O2 Delivery Pre Treatment  nasal cannula  -MA      O2 Delivery Intra Treatment  nasal cannula  -MA     Post SpO2 (%)  95  -MA     O2 Delivery Post Treatment  nasal cannula  -MA     Pre Patient Position  Supine  -MA     Intra Patient Position  Standing  -MA     Post Patient Position  Sitting  -MA     Row Name 03/09/21 0839          Positioning and Restraints    Pre-Treatment Position  in bed  -MA     Post Treatment Position  chair  -MA     In Chair  reclined;call light within reach;encouraged to call for assist;exit alarm on;with nsg;legs elevated  -MA       User Key  (r) = Recorded By, (t) = Taken By, (c) = Cosigned By    Initials Name Provider Type    Ginny Chicas OT Occupational Therapist        Outcome Measures     Row Name 03/09/21 0843          How much help from another is currently needed...    Putting on and taking off regular lower body clothing?  2  -MA     Bathing (including washing, rinsing, and drying)  2  -MA     Toileting (which includes using toilet bed pan or urinal)  3  -MA     Putting on and taking off regular upper body clothing  3  -MA     Taking care of personal grooming (such as brushing teeth)  4  -MA     Eating meals  4  -MA     AM-PAC 6 Clicks Score (OT)  18  -MA     Row Name 03/09/21 0843          Functional Assessment    Outcome Measure Options  AM-PAC 6 Clicks Daily Activity (OT)  -MA       User Key  (r) = Recorded By, (t) = Taken By, (c) = Cosigned By    Initials Name Provider Type    Ginny Chicas OT Occupational Therapist        Occupational Therapy Education                 Title: PT OT SLP Therapies (In Progress)     Topic: Occupational Therapy (In Progress)     Point: ADL training (Done)     Description:   Instruct learner(s) on proper safety adaptation and remediation techniques during self care or transfers.   Instruct in proper use of assistive devices.              Learning Progress Summary           Patient Acceptance, E, VU by MA at 3/9/2021 0809                   Point: Home exercise program (Not Started)      Description:   Instruct learner(s) on appropriate technique for monitoring, assisting and/or progressing therapeutic exercises/activities.              Learner Progress:  Not documented in this visit.          Point: Precautions (Done)     Description:   Instruct learner(s) on prescribed precautions during self-care and functional transfers.              Learning Progress Summary           Patient Acceptance, E, VU by MA at 3/9/2021 0809                   Point: Body mechanics (Done)     Description:   Instruct learner(s) on proper positioning and spine alignment during self-care, functional mobility activities and/or exercises.              Learning Progress Summary           Patient Acceptance, E, VU by MA at 3/9/2021 0809                               User Key     Initials Effective Dates Name Provider Type Discipline    MA 11/19/20 -  Ginny Fisher OT Occupational Therapist OT              OT Recommendation and Plan  Therapy Frequency (OT): daily  Plan of Care Review  Plan of Care Reviewed With: patient  Outcome Summary: OT eval complete. Pt presents w/ decreased activity tolerance, strength, and balance limiting his ADL independence. Pt SBA for bed mobility, CGA for STS and bed-to-chair transfer. Pt would benefit from continued skilled OT services to address current functional deficits and return to PLOF. Recommend home w/ A & HHPT at discharge.     Time Calculation:   Time Calculation- OT     Row Name 03/09/21 0844             Time Calculation- OT    OT Start Time  0809  -MA      OT Received On  03/09/21  -MA      OT Goal Re-Cert Due Date  03/19/21  -MA        User Key  (r) = Recorded By, (t) = Taken By, (c) = Cosigned By    Initials Name Provider Type    MA Ginny Fisher OT Occupational Therapist        Therapy Charges for Today     Code Description Service Date Service Provider Modifiers Qty    [id number]  OT EVAL MOD COMPLEXITY 4 3/9/2021 Ginny Fisher OT GO 1               Ginny Fisher  OT  3/9/2021

## 2021-03-09 NOTE — PLAN OF CARE
Goal Outcome Evaluation:  Plan of Care Reviewed With: patient     Outcome Summary: OT eval complete. Pt presents w/ decreased activity tolerance, strength, and balance limiting his ADL independence. Pt SBA for bed mobility, CGA for STS and bed-to-chair transfer. Pt would benefit from continued skilled OT services to address current functional deficits and return to PLOF. Recommend home w/ A & HHPT at discharge.

## 2021-03-10 ENCOUNTER — APPOINTMENT (OUTPATIENT)
Dept: GENERAL RADIOLOGY | Facility: HOSPITAL | Age: 76
End: 2021-03-10

## 2021-03-10 LAB
ALBUMIN SERPL-MCNC: 2.8 G/DL (ref 3.5–5.2)
ANION GAP SERPL CALCULATED.3IONS-SCNC: 4 MMOL/L (ref 5–15)
BASOPHILS # BLD AUTO: 0.03 10*3/MM3 (ref 0–0.2)
BASOPHILS NFR BLD AUTO: 0.3 % (ref 0–1.5)
BUN SERPL-MCNC: 13 MG/DL (ref 8–23)
BUN/CREAT SERPL: 21 (ref 7–25)
CALCIUM SPEC-SCNC: 8.6 MG/DL (ref 8.6–10.5)
CHLORIDE SERPL-SCNC: 98 MMOL/L (ref 98–107)
CO2 SERPL-SCNC: 31 MMOL/L (ref 22–29)
CREAT SERPL-MCNC: 0.62 MG/DL (ref 0.76–1.27)
DEPRECATED RDW RBC AUTO: 43.8 FL (ref 37–54)
EOSINOPHIL # BLD AUTO: 0.04 10*3/MM3 (ref 0–0.4)
EOSINOPHIL NFR BLD AUTO: 0.4 % (ref 0.3–6.2)
ERYTHROCYTE [DISTWIDTH] IN BLOOD BY AUTOMATED COUNT: 13.3 % (ref 12.3–15.4)
GFR SERPL CREATININE-BSD FRML MDRD: 126 ML/MIN/1.73
GLUCOSE BLDC GLUCOMTR-MCNC: 149 MG/DL (ref 70–130)
GLUCOSE BLDC GLUCOMTR-MCNC: 169 MG/DL (ref 70–130)
GLUCOSE BLDC GLUCOMTR-MCNC: 170 MG/DL (ref 70–130)
GLUCOSE BLDC GLUCOMTR-MCNC: 250 MG/DL (ref 70–130)
GLUCOSE SERPL-MCNC: 160 MG/DL (ref 65–99)
HCT VFR BLD AUTO: 31.4 % (ref 37.5–51)
HGB BLD-MCNC: 9.7 G/DL (ref 13–17.7)
IMM GRANULOCYTES # BLD AUTO: 0.04 10*3/MM3 (ref 0–0.05)
IMM GRANULOCYTES NFR BLD AUTO: 0.4 % (ref 0–0.5)
LYMPHOCYTES # BLD AUTO: 0.75 10*3/MM3 (ref 0.7–3.1)
LYMPHOCYTES NFR BLD AUTO: 7.8 % (ref 19.6–45.3)
MAGNESIUM SERPL-MCNC: 1.8 MG/DL (ref 1.6–2.4)
MCH RBC QN AUTO: 27.7 PG (ref 26.6–33)
MCHC RBC AUTO-ENTMCNC: 30.9 G/DL (ref 31.5–35.7)
MCV RBC AUTO: 89.7 FL (ref 79–97)
MONOCYTES # BLD AUTO: 1.31 10*3/MM3 (ref 0.1–0.9)
MONOCYTES NFR BLD AUTO: 13.6 % (ref 5–12)
NEUTROPHILS NFR BLD AUTO: 7.45 10*3/MM3 (ref 1.7–7)
NEUTROPHILS NFR BLD AUTO: 77.5 % (ref 42.7–76)
NRBC BLD AUTO-RTO: 0 /100 WBC (ref 0–0.2)
PHOSPHATE SERPL-MCNC: 4.8 MG/DL (ref 2.5–4.5)
PLATELET # BLD AUTO: 250 10*3/MM3 (ref 140–450)
PMV BLD AUTO: 10.4 FL (ref 6–12)
POTASSIUM SERPL-SCNC: 4.8 MMOL/L (ref 3.5–5.2)
RBC # BLD AUTO: 3.5 10*6/MM3 (ref 4.14–5.8)
SODIUM SERPL-SCNC: 133 MMOL/L (ref 136–145)
UFH PPP CHRO-ACNC: 0.11 IU/ML (ref 0.3–0.7)
UFH PPP CHRO-ACNC: 0.49 IU/ML (ref 0.3–0.7)
WBC # BLD AUTO: 9.62 10*3/MM3 (ref 3.4–10.8)

## 2021-03-10 PROCEDURE — 97164 PT RE-EVAL EST PLAN CARE: CPT

## 2021-03-10 PROCEDURE — 99232 SBSQ HOSP IP/OBS MODERATE 35: CPT | Performed by: INTERNAL MEDICINE

## 2021-03-10 PROCEDURE — 25010000002 KETOROLAC TROMETHAMINE PER 15 MG: Performed by: INTERNAL MEDICINE

## 2021-03-10 PROCEDURE — 99231 SBSQ HOSP IP/OBS SF/LOW 25: CPT | Performed by: PHYSICIAN ASSISTANT

## 2021-03-10 PROCEDURE — 25010000002 HEPARIN (PORCINE) 25000-0.45 UT/250ML-% SOLUTION: Performed by: PHYSICIAN ASSISTANT

## 2021-03-10 PROCEDURE — 25010000003 MAGNESIUM SULFATE 4 GM/100ML SOLUTION: Performed by: PHYSICIAN ASSISTANT

## 2021-03-10 PROCEDURE — 25010000002 KETOROLAC TROMETHAMINE PER 15 MG: Performed by: PHYSICIAN ASSISTANT

## 2021-03-10 PROCEDURE — 80069 RENAL FUNCTION PANEL: CPT | Performed by: PHYSICIAN ASSISTANT

## 2021-03-10 PROCEDURE — 83735 ASSAY OF MAGNESIUM: CPT | Performed by: PHYSICIAN ASSISTANT

## 2021-03-10 PROCEDURE — 85520 HEPARIN ASSAY: CPT

## 2021-03-10 PROCEDURE — 97530 THERAPEUTIC ACTIVITIES: CPT

## 2021-03-10 PROCEDURE — 82962 GLUCOSE BLOOD TEST: CPT

## 2021-03-10 PROCEDURE — 85025 COMPLETE CBC W/AUTO DIFF WBC: CPT | Performed by: PHYSICIAN ASSISTANT

## 2021-03-10 PROCEDURE — 63710000001 ONDANSETRON PER 8 MG: Performed by: INTERNAL MEDICINE

## 2021-03-10 PROCEDURE — 71045 X-RAY EXAM CHEST 1 VIEW: CPT

## 2021-03-10 PROCEDURE — 85520 HEPARIN ASSAY: CPT | Performed by: INTERNAL MEDICINE

## 2021-03-10 PROCEDURE — 63710000001 DRONABINOL PER 2.5 MG: Performed by: PHYSICIAN ASSISTANT

## 2021-03-10 RX ADMIN — SODIUM CHLORIDE, PRESERVATIVE FREE 10 ML: 5 INJECTION INTRAVENOUS at 20:38

## 2021-03-10 RX ADMIN — SODIUM CHLORIDE, PRESERVATIVE FREE 10 ML: 5 INJECTION INTRAVENOUS at 09:53

## 2021-03-10 RX ADMIN — MAGNESIUM SULFATE HEPTAHYDRATE 4 G: 40 INJECTION, SOLUTION INTRAVENOUS at 08:09

## 2021-03-10 RX ADMIN — KETOROLAC TROMETHAMINE 15 MG: 15 INJECTION, SOLUTION INTRAMUSCULAR; INTRAVENOUS at 21:47

## 2021-03-10 RX ADMIN — ONDANSETRON HYDROCHLORIDE 4 MG: 4 TABLET, FILM COATED ORAL at 17:09

## 2021-03-10 RX ADMIN — SODIUM CHLORIDE, PRESERVATIVE FREE 10 ML: 5 INJECTION INTRAVENOUS at 01:57

## 2021-03-10 RX ADMIN — SODIUM CHLORIDE, POTASSIUM CHLORIDE, SODIUM LACTATE AND CALCIUM CHLORIDE 1000 ML: 600; 310; 30; 20 INJECTION, SOLUTION INTRAVENOUS at 09:31

## 2021-03-10 RX ADMIN — MIDODRINE HYDROCHLORIDE 10 MG: 10 TABLET ORAL at 21:46

## 2021-03-10 RX ADMIN — KETOROLAC TROMETHAMINE 15 MG: 15 INJECTION, SOLUTION INTRAMUSCULAR; INTRAVENOUS at 15:23

## 2021-03-10 RX ADMIN — DRONABINOL 5 MG: 2.5 CAPSULE ORAL at 06:21

## 2021-03-10 RX ADMIN — MIDODRINE HYDROCHLORIDE 10 MG: 10 TABLET ORAL at 15:23

## 2021-03-10 RX ADMIN — ACETAMINOPHEN 650 MG: 325 TABLET ORAL at 20:37

## 2021-03-10 RX ADMIN — ACETAMINOPHEN 650 MG: 325 TABLET ORAL at 01:58

## 2021-03-10 RX ADMIN — HEPARIN SODIUM 24 UNITS/KG/HR: 10000 INJECTION, SOLUTION INTRAVENOUS at 08:09

## 2021-03-10 RX ADMIN — LINAGLIPTIN 5 MG: 5 TABLET, FILM COATED ORAL at 08:09

## 2021-03-10 RX ADMIN — KETOROLAC TROMETHAMINE 15 MG: 15 INJECTION, SOLUTION INTRAMUSCULAR; INTRAVENOUS at 08:03

## 2021-03-10 RX ADMIN — MIDODRINE HYDROCHLORIDE 10 MG: 10 TABLET ORAL at 06:21

## 2021-03-10 NOTE — PLAN OF CARE
Goal Outcome Evaluation:   Uneventful night.  VSS. Pt had c/o pain last night at chest tube in Toradol ordered and given with relief. Afebrile. .  ~150 output from chest tube.

## 2021-03-10 NOTE — PROGRESS NOTES
"                  Clinical Nutrition     Reason for Visit: MDR, Identified at risk by screening criteria    Patient Name: Danny Alvarez Jr.  YOB: 1945  MRN: [id number]  Date of Encounter: 03/10/21 16:27 EST  Admission date: 3/1/2021    Nutrition Assessment   Admission Problem List:  ARF  Malignant pleural effusion  L. PTX  Ascites  Metastatic Adenocarcimoma  Portal vein thrombosis  DVT  Elevated troponin  A/C CHF    s/p L. thoracentesis 3-1-21    s/p CT guided L. chest tube placement 3-3-21    s/p R. Pleurx catheter and L. chest tube placement 3-9-21      PMH:   He  has a past medical history of Adenocarcinoma (CMS/HCC), Ascites, CHF (congestive heart failure) (CMS/HCC), Diabetes mellitus (CMS/HCC), DVT (deep vein thrombosis) in pregnancy, Hypertension, Hyponatremia, and Pleural effusion.  PSxH:  He  has a past surgical history that includes Clavicle surgery and Pleural Catheter Insertion (Right, 3/9/2021). h/o recent dental infection      Reported/Observed/Food/Nutrition Related History:     Pt sitting up in chair, on room air, appears depressed, pt reports he had a \"gastric attack\" s/p moving to the floor, felt like bad reflux, feels better now that he had a shot, he has been drinking ensure HP, states that he tolerates toast and canned fruit best, does not want any rich foods    Anthropometrics   Height: 70in  Weight: 164lb standing scale 3-1-21  BMI: 23.5  BMI classification: Normal: 18.5-24.9kg/m2     UBW: 165-172lb    Weight change: 50lb over past 5 years attributed to increased physical activity      Last 15 Recorded Weights  View Complete Flowsheet  Weight Weight (kg) Weight (lbs) Weight Method   3/10/2021 75.07 kg 165 lb 8 oz Standing scale   3/9/2021 55.8 kg 123 lb 0.3 oz Bed scale   3/4/2021 74.39 kg 164 lb -   3/3/2021 74.39 kg 164 lb -   3/1/2021 74.753 kg 164 lb 12.8 oz Standing scale   3/1/2021 74.844 kg 165 lb Stated   3/1/2021 74.844 kg 165 lb Stated              Labs reviewed " "    Results from last 7 days   Lab Units 03/10/21  0355 03/04/21  0658   SODIUM mmol/L 133* 135*   POTASSIUM mmol/L 4.8 4.2   CHLORIDE mmol/L 98 98   CO2 mmol/L 31.0* 28.0   BUN mg/dL 13 21   CREATININE mg/dL 0.62* 0.66*   CALCIUM mg/dL 8.6 9.2   BILIRUBIN mg/dL  --  0.8   ALK PHOS U/L  --  84   ALT (SGPT) U/L  --  13   AST (SGOT) U/L  --  65*   GLUCOSE mg/dL 160* 207*       Results from last 7 days   Lab Units 03/10/21  1145 03/10/21  0726 03/09/21 2003 03/09/21  1738 03/09/21  1202 03/09/21  0726   GLUCOSE mg/dL 170* 149* 147* 133* 181* 146*     Lab Results   Lab Value Date/Time    HGBA1C 9.20 (H) 03/02/2021 0409       Medications reviewed   Pertinent: abx, marinol, pepcid, heparin    Intake/Ouptut 24 hrs (7:00AM - 6:59 AM)     Intake & Output (last day)       03/09 0701 - 03/10 0700 03/10 0701 - 03/11 0700    I.V. (mL/kg)  48.6 (0.6)    Total Intake(mL/kg)  48.6 (0.6)    Urine (mL/kg/hr) 325 (0.2) 100 (0.1)    Chest Tube 635 150    Total Output 960 250    Net -960 -201.4                     GI: pt reports bout of reflux ~\" gastric attack\"    SKIN: stage 1 coccyx    Current Nutrition Prescription     PO: Diet Regular; Consistent Carbohydrate    Ensure HP 3x/day    Intake: 31% 4 meals    Nutrition Diagnosis   Date: 3-4-21, 310  Problem Inadequate oral intake   Etiology Per Clinical Status   Signs/Symptoms Poor appetite, ascites, wt loss     Nutrition Intervention   1.  Follow treatment progress, Advise alternate selection, Advised available snacks, Interview for preferences, Menu provided, Menu adjusted    Consider changing to Regular diet if po intake remains poor     Goal:   General: Nutrition support treatment  PO: Increase intake    Additional goals: consider DM Education when more appropriate    Monitoring/Evaluation:   Per protocol, I&O, PO intake, Pertinent labs, Weight, Skin status, GI status, Symptoms, Hemodynamic stability  Francheska Acevedo, RENNY  Time Spent: 30min   "

## 2021-03-10 NOTE — PROGRESS NOTES
Intensive Care Follow-up     Hospital:  LOS: 9 days   Mr. Danny Alvarez Jr., 75 y.o. male is followed for:   Pleural effusion on left            History of present illness:   75-year-old male admitted on 3/1 with increasing shortness of breath.  He also had significant recent 50 pound weight loss.  Further work-up revealed large bilateral pleural effusions left greater than right.  CT of the abdomen and pelvis revealed a thrombosed portal vein and reticular abnormalities in the right upper quadrant omental fat with concerns of omental metastases.  Echocardiogram revealed a decreased ejection fraction in the range of 41-45%.  There was diastolic dysfunction as well and mild LVH.  He underwent left thoracentesis and subsequent pigtail chest tube placement.  He was transferred to the ICU on the evening of 3/3 after chest tube placement due to concerns over hypotension.  Pleural fluid cytology consistent with adenocarcinoma.      Subjective   Interval History:  Patient doing well this morning.  No issues with right-sided Pleurx placement.  CT surgery plans to watch the left-sided chest tube 1 more day and may consider putting a Pleurx on the left side as well prior to discharge.  Patient has no other complaints.           The patient's past medical, surgical and social history were reviewed and updated in Epic as appropriate.       Objective     Infusions:  heparin, 24 Units/kg/hr, Last Rate: 24 Units/kg/hr (03/10/21 0809)  lactated ringers, 9 mL/hr, Last Rate: 9 mL/hr (03/09/21 1415)  Pharmacy to Dose Heparin,       Medications:  dronabinol, 5 mg, Oral, BID  lactated ringers, 1,000 mL, Intravenous, Once  linagliptin, 5 mg, Oral, Daily  midodrine, 10 mg, Oral, Q8H  sodium chloride, 10 mL, Intravenous, Q12H      I reviewed the patient's medications.    Vital Sign Min/Max for last 24 hours  Temp  Min: 97.5 °F (36.4 °C)  Max: 97.7 °F (36.5 °C)   BP  Min: 74/52  Max: 111/67   Pulse  Min: 65  Max: 86   Resp  Min: 15  Max: 20    SpO2  Min: 90 %  Max: 100 %   Flow (L/min)  Min: 2  Max: 3       Input/Output for last 24 hour shift  03/09 0701 - 03/10 0700  In: -   Out: 960 [Urine:325]      GENERAL : NAD, conversant  RESPIRATORY/THORAX : normal respiratory effort and no intercostal retractions, Coarse crackles bilaterally  CARDIOVASCULAR : Normal S1/S2, RRR.  No lower ext edema.  GASTROINTESTINAL : Soft, NT/ND. BS x 4 normoactive. No hepatosplenomegaly.  MUSCULOSKELETAL : No cyanosis, clubbing, or ischemia  NEUROLOGICAL: alert and oriented to person, place and time  PSYCHOLOGICAL : Appropriate affect    Results from last 7 days   Lab Units 03/10/21  0355 03/09/21 0407 03/08/21  0349   WBC 10*3/mm3 9.62 7.19 9.79   HEMOGLOBIN g/dL 9.7* 9.8* 9.6*   PLATELETS 10*3/mm3 250 230 201     Results from last 7 days   Lab Units 03/10/21  0355 03/09/21 0407 03/08/21  0349   SODIUM mmol/L 133* 131* 129*   POTASSIUM mmol/L 4.8 4.7 4.7   CO2 mmol/L 31.0* 30.0* 30.0*   BUN mg/dL 13 9 11   CREATININE mg/dL 0.62* 0.55* 0.57*   MAGNESIUM mg/dL 1.8 1.9 1.7   PHOSPHORUS mg/dL 4.8* 3.5 2.7   GLUCOSE mg/dL 160* 152* 181*     Estimated Creatinine Clearance: 63 mL/min (A) (by C-G formula based on SCr of 0.62 mg/dL (L)).          I reviewed the patient's new clinical results.  I reviewed the patient's new imaging results/reports including actual images and agree with reports.              Imaging Results (Last 24 Hours)     Procedure Component Value Units Date/Time    XR Chest 1 View [[id number]] Collected: 03/10/21 0833     Updated: 03/10/21 0837    Narrative:      EXAMINATION: XR CHEST 1 VW-      INDICATION: Left effusion, L SBCT; T57-Yzeahqv effusion, not elsewhere  classified; R93.5-Abnormal findings on diagnostic imaging of other  abdominal regions, including retroperitoneum; R06.00-Dyspnea,  unspecified; R77.8-Other specified abnormalities of plasma proteins      COMPARISON: One day prior     FINDINGS: Bilateral pleural drains remain in place. Trace  persistent  right effusion. No significant effusion on the left with persistent  moderate pneumothorax. Unchanged heart and mediastinal contours.       Impression:      No significant interval change.     This report was finalized on 3/10/2021 8:34 AM by Asif Fletcher.       XR Chest 1 View [[id number]] Collected: 03/09/21 1917     Updated: 03/09/21 1919    Narrative:      CR Chest 1 Vw    INDICATION:   Pneumothorax, pleural effusion    COMPARISON:    Chest x-ray from 3/19/2021    FINDINGS:  Single portable AP view(s) of the chest.    Heart borders are obscured. There may be slight interval improvement in aeration in the right lung field. Right pleural fluid is again noted. Again noted is a left-sided hydropneumothorax but does not appear definitely changed from prior, although it is  not well visualized.      Impression:      Findings may have mildly improved on the right. There is continued left-sided hydropneumothorax that does not appear significantly changed.    Signer Name: Patti Puente MD   Signed: 3/9/2021 7:17 PM   Workstation Name: [id number]    [facility]    XR Chest 1 View [[id number]] Collected: 03/09/21 0825     Updated: 03/09/21 1657    Narrative:      EXAMINATION: XR CHEST 1 VW- 03/09/2021     INDICATION: Left effusion, L SBCT; U34-Ffyejtc effusion, not elsewhere  classified; R93.5-Abnormal findings on diagnostic imaging of other  abdominal regions, including retroperitoneum; R06.00-Dyspnea,  unspecified; R77.8-Other specified abnormalities of plasma proteins      COMPARISON: Chest x-ray 03/08/2021     FINDINGS: Left small bore chest tube in place with persistent layering  opacifications and effusion left hemithorax along with a left apical  predominant stable to slightly increased small to moderate volume left  pneumothorax. Right pleural effusion and opacification similar.          Impression:      Similar appearance of left layering pleural effusion with a  left apical  predominant pneumothorax demonstrating potential mild  increase in size from prior comparison.     D:  03/09/2021  E:  03/09/2021        This report was finalized on 3/9/2021 4:53 PM by Dr. Arturo Melendez.       XR [place] OR Procedure [[id number]] Resulted: 03/09/21 1634     Updated: 03/09/21 1634          Assessment/Plan   Impression        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    Elevated troponin    Ascites    Portal vein thrombosis    Essential hypertension    Type 2 diabetes mellitus without complication (CMS/HCC)    Acute on chronic combined systolic and diastolic CHF (congestive heart failure) (CMS/HCC)    Acute deep vein thrombosis (DVT) of right peroneal vein (CMS/HCC)    Right-sided pleural effusion    Hyponatremia       Plan        75-year-old male with a past medical history sniffing for hypertension, diabetes mellitus, systolic heart failure (EF 41%), and portal vein thrombosis.  Who presented to [facility] on 3/1/2021.  In which CT the abdomen pelvis revealed a portal vein thrombosis, with concerns for omental metastasis, echo showing EF of 41% and diastolic dysfunction.  He underwent a pigtail catheter placement on 3/3/2021 with pleural fluid showing adenocarcinoma.  Initially hypotensive, but was weaned off pressors on 3/7/2021 with the addition of midodrine.  CT of the chest from 3/7/2021 showed the left-sided effusion is loculated with continued trap left lung and a hydropneumothorax.  3/4/2021 lower extremity Doppler showed a DVT of the peroneal on the right.  He has continued on heparin drip due to the DVT and portal vein thrombosis.     · Continue left chest tube to suction, will defer to CT surgery regarding management with Pleurx placement possibly tomorrow.  Right Pleurx now in place.  · Continue heparin drip for now for portal vein thrombosis and DVT, will hold based on surgery recommendations  · Continue midodrine to 10 mg 3 times daily  · We will give a  liter bolus with poor p.o. intake and low urine output  · P.o. diet  · Mobilize patient  · Aggressive pulmonary toilet  · A.m. labs  · Patient okay to be downgraded to the floor    Plan of care and goals reviewed with multidisciplinary/antibiotic stewardship team during rounds.   I discussed the patient's findings and my recommendations with patient and nursing staff       Mercy Machuca DO  Pulmonary, Critical care and Sleep Medicine

## 2021-03-10 NOTE — PLAN OF CARE
Goal Outcome Evaluation:  Plan of Care Reviewed With: patient  Progress: no change  Outcome Summary: PT re-eval complete. Pt continues to present with impaired mobility from PLOF warranting continued IPPT. Ambulated 230 feet with RW and CGA for safety. Edu to pt and spouse on POC and HEP. Goals remain appropriate. Continue d/c recs for home with assist.

## 2021-03-10 NOTE — PROGRESS NOTES
"Danny Alvarez Jr.  [id number]  1945     LOS: 9 days   Patient Care Team:  Jef Beckham MD as PCP - General (Internal Medicine)    Chief Complaint: Malignant pleural effusions      Subjective: No complaints, breathing easily    Objective:     Vital Sign Min/Max for last 24 hours  Temp  Min: 97.5 °F (36.4 °C)  Max: 97.7 °F (36.5 °C)   BP  Min: 80/58  Max: 111/67   Pulse  Min: 65  Max: 86   Resp  Min: 15  Max: 20   SpO2  Min: 90 %  Max: 100 %   No data recorded   Weight  Min: 55.8 kg (123 lb 0.3 oz)  Max: 55.8 kg (123 lb 0.3 oz)     Flowsheet Rows      First Filed Value   Admission Height  177.8 cm (70\") Documented at 03/01/2021 1027   Admission Weight  74.8 kg (165 lb) Documented at 03/01/2021 1027          Physical Exam:    Wound:    Pulses:     Mediastinal and Chest Tube Drainage: No air leak noted       Results Review:   Results from last 7 days   Lab Units 03/10/21  0355   WBC 10*3/mm3 9.62   HEMOGLOBIN g/dL 9.7*   HEMATOCRIT % 31.4*   PLATELETS 10*3/mm3 250     Results from last 7 days   Lab Units 03/09/21  0407   SODIUM mmol/L 131*   POTASSIUM mmol/L 4.7   CHLORIDE mmol/L 96*   CO2 mmol/L 30.0*   BUN mg/dL 9   CREATININE mg/dL 0.55*   GLUCOSE mg/dL 152*   CALCIUM mg/dL 8.5*             Assessment      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    Elevated troponin    Ascites    Portal vein thrombosis    Essential hypertension    Type 2 diabetes mellitus without complication (CMS/[facility])    Acute on chronic combined systolic and diastolic CHF (congestive heart failure) (CMS/HCC)    Acute deep vein thrombosis (DVT) of right peroneal vein (CMS/HCC)    Right-sided pleural effusion    Hyponatremia      Patient still has incomplete expansion of the left lung.  I suspect he has an entrapped lung from the malignant effusion.  We will keep the chest tube for 1 more day and if it does not improve then we will remove it.  He may require a Pleurx catheter in the left side as well.  Discussed fully with " patient.        Richy Reyes MD  03/10/21  06:25 EST      Please note that portions of this note were completed with a voice recognition program. Efforts were made to edit the dictations, but words may be mistranscribed

## 2021-03-10 NOTE — PROGRESS NOTES
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  Had new pleurx placed yesterday, and CT on left remains.      REVIEW OF SYSTEMS:  A 14 point review of systems was performed and is negative except as noted above.      Objective     Vitals:    03/10/21 1155 03/10/21 1200 03/10/21 1230 03/10/21 1300   BP:  93/62 104/62 103/64   BP Location:    Right arm   Patient Position:    Lying   Pulse: 80 84 79 75   Resp: 18   18   Temp: 97.5 °F (36.4 °C)   97.6 °F (36.4 °C)   TempSrc: Oral   Oral   SpO2: 95% 92% 98%    Weight:       Height:                       Performance Status: 3  General: cachectic appearing male in no acute distress  Neuro: alert and oriented  HEENT: sclerae anicteric, oropharynx clear  Lymphatics: no cervical, supraclavicular, or axillary adenopathy  Cardiovascular: regular rate and rhythm, no murmurs  Lungs: decreased bilateral bases  Abdomen: soft, nontender, nondistended.  No palpable organomegaly  Extremities: no lower extremity edema  Skin: no rashes, lesions, bruising, or petechiae  Psych: mood and affect appropriate        Labs:       WBC   Date Value Ref Range Status   03/10/2021 9.62 3.40 - 10.80 10*3/mm3 Final     RBC   Date Value Ref Range Status   03/10/2021 3.50 (L) 4.14 - 5.80 10*6/mm3 Final     Hemoglobin   Date Value Ref Range Status   03/10/2021 9.7 (L) 13.0 - 17.7 g/dL Final     Hematocrit   Date Value Ref Range Status   03/10/2021 31.4 (L) 37.5 - 51.0 %  Final     MCV   Date Value Ref Range Status   03/10/2021 89.7 79.0 - 97.0 fL Final     MCH   Date Value Ref Range Status   03/10/2021 27.7 26.6 - 33.0 pg Final     MCHC   Date Value Ref Range Status   03/10/2021 30.9 (L) 31.5 - 35.7 g/dL Final     RDW   Date Value Ref Range Status   03/10/2021 13.3 12.3 - 15.4 % Final     RDW-SD   Date Value Ref Range Status   03/10/2021 43.8 37.0 - 54.0 fl Final     MPV   Date Value Ref Range Status   03/10/2021 10.4 6.0 - 12.0 fL Final     Platelets   Date Value Ref Range Status   03/10/2021 250 140 - 450 10*3/mm3 Final     Neutrophil %   Date Value Ref Range Status   03/10/2021 77.5 (H) 42.7 - 76.0 % Final     Lymphocyte %   Date Value Ref Range Status   03/10/2021 7.8 (L) 19.6 - 45.3 % Final     Monocyte %   Date Value Ref Range Status   03/10/2021 13.6 (H) 5.0 - 12.0 % Final     Eosinophil %   Date Value Ref Range Status   03/10/2021 0.4 0.3 - 6.2 % Final     Basophil %   Date Value Ref Range Status   03/10/2021 0.3 0.0 - 1.5 % Final     Immature Grans %   Date Value Ref Range Status   03/10/2021 0.4 0.0 - 0.5 % Final     Neutrophils, Absolute   Date Value Ref Range Status   03/10/2021 7.45 (H) 1.70 - 7.00 10*3/mm3 Final     Lymphocytes, Absolute   Date Value Ref Range Status   03/10/2021 0.75 0.70 - 3.10 10*3/mm3 Final     Monocytes, Absolute   Date Value Ref Range Status   03/10/2021 1.31 (H) 0.10 - 0.90 10*3/mm3 Final     Eosinophils, Absolute   Date Value Ref Range Status   03/10/2021 0.04 0.00 - 0.40 10*3/mm3 Final     Basophils, Absolute   Date Value Ref Range Status   03/10/2021 0.03 0.00 - 0.20 10*3/mm3 Final     Immature Grans, Absolute   Date Value Ref Range Status   03/10/2021 0.04 0.00 - 0.05 10*3/mm3 Final     nRBC   Date Value Ref Range Status   03/10/2021 0.0 0.0 - 0.2 /100 WBC Final       Glucose   Date Value Ref Range Status   03/10/2021 160 (H) 65 - 99 mg/dL Final     BUN   Date Value Ref Range Status   03/10/2021 13 8 - 23 mg/dL Final     Creatinine   Date  Value Ref Range Status   03/10/2021 0.62 (L) 0.76 - 1.27 mg/dL Final     Sodium   Date Value Ref Range Status   03/10/2021 133 (L) 136 - 145 mmol/L Final     Potassium   Date Value Ref Range Status   03/10/2021 4.8 3.5 - 5.2 mmol/L Final     Chloride   Date Value Ref Range Status   03/10/2021 98 98 - 107 mmol/L Final     CO2   Date Value Ref Range Status   03/10/2021 31.0 (H) 22.0 - 29.0 mmol/L Final     Calcium   Date Value Ref Range Status   03/10/2021 8.6 8.6 - 10.5 mg/dL Final     Albumin   Date Value Ref Range Status   03/10/2021 2.80 (L) 3.50 - 5.20 g/dL Final     eGFR Non  Amer   Date Value Ref Range Status   03/10/2021 126 >60 mL/min/1.73 Final     BUN/Creatinine Ratio   Date Value Ref Range Status   03/10/2021 21.0 7.0 - 25.0 Final     Anion Gap   Date Value Ref Range Status   03/10/2021 4.0 (L) 5.0 - 15.0 mmol/L Final       Lab Results   Component Value Date    CEA 0.75 03/05/2021               Assessment/Plan     Danny Alvarez Jr. is a 75 y.o. year old male  with metastatic malignancy with bilateral large effusion, possible omental disease, portal vein thrombosis and lower extremity DVT.     Metastatic adenocarcinoma: CA 19/9 markedly elevated.  Cytology suggests a pancreaticobiliary primary.  Currently remains inpatient with chest tubes in place.  Still consider options.    Bilateral pleural effusions with hydropneumothorax:  pleurx on right, CT on left.       Portal vein thrombosis/DVT: secondary to metastatic malignancy.  Will need indefinite anticoagulation.      DM: no current tx.                     Anupama Desouza MD  3/10/2021

## 2021-03-10 NOTE — PROGRESS NOTES
Continued Stay Note  [facility]     Patient Name: Danny Alvarez Jr.  MRN: [id number]  Today's Date: 3/10/2021    Admit Date: 3/1/2021    Discharge Plan     Row Name 03/10/21 1111       Plan    Plan  Home with home health    Patient/Family in Agreement with Plan  yes    Plan Comments  Spoke with patient and wife at bedside.  Patient had pluerx drain placed yesterday.  Probability of patient having a second one placed tomorrow.  All paperwork completed for pleurx drains however CM did not fax packet waiting to see if patient will go home with two drains. Also unsure of when patient will be ready for discharge. Packet for pleurx drains in patients chartlet with a copy of all paperwork in CM office.  Patient and wife in agreement with home health and do not have a prefernce of an agency.  CM will continue to follow.        Discharge Codes    No documentation.       Expected Discharge Date and Time     Expected Discharge Date Expected Discharge Time    Mar 12, 2021             Tayler Roberto RN

## 2021-03-11 ENCOUNTER — APPOINTMENT (OUTPATIENT)
Dept: GENERAL RADIOLOGY | Facility: HOSPITAL | Age: 76
End: 2021-03-11

## 2021-03-11 PROBLEM — C25.3: Status: ACTIVE | Noted: 2021-03-11

## 2021-03-11 LAB
ALBUMIN SERPL-MCNC: 2.5 G/DL (ref 3.5–5.2)
ANION GAP SERPL CALCULATED.3IONS-SCNC: 7 MMOL/L (ref 5–15)
BASOPHILS # BLD AUTO: 0.05 10*3/MM3 (ref 0–0.2)
BASOPHILS NFR BLD AUTO: 0.5 % (ref 0–1.5)
BH BB BLOOD EXPIRATION DATE: NORMAL
BH BB BLOOD TYPE BARCODE: 5100
BH BB DISPENSE STATUS: NORMAL
BH BB PRODUCT CODE: NORMAL
BH BB UNIT NUMBER: NORMAL
BUN SERPL-MCNC: 13 MG/DL (ref 8–23)
BUN/CREAT SERPL: 25.5 (ref 7–25)
CALCIUM SPEC-SCNC: 8.4 MG/DL (ref 8.6–10.5)
CHLORIDE SERPL-SCNC: 95 MMOL/L (ref 98–107)
CO2 SERPL-SCNC: 27 MMOL/L (ref 22–29)
CREAT SERPL-MCNC: 0.51 MG/DL (ref 0.76–1.27)
CROSSMATCH INTERPRETATION: NORMAL
DEPRECATED RDW RBC AUTO: 42.3 FL (ref 37–54)
EOSINOPHIL # BLD AUTO: 0.21 10*3/MM3 (ref 0–0.4)
EOSINOPHIL NFR BLD AUTO: 2.2 % (ref 0.3–6.2)
ERYTHROCYTE [DISTWIDTH] IN BLOOD BY AUTOMATED COUNT: 13.3 % (ref 12.3–15.4)
GFR SERPL CREATININE-BSD FRML MDRD: >150 ML/MIN/1.73
GLUCOSE BLDC GLUCOMTR-MCNC: 155 MG/DL (ref 70–130)
GLUCOSE BLDC GLUCOMTR-MCNC: 156 MG/DL (ref 70–130)
GLUCOSE BLDC GLUCOMTR-MCNC: 192 MG/DL (ref 70–130)
GLUCOSE SERPL-MCNC: 150 MG/DL (ref 65–99)
HCT VFR BLD AUTO: 30.8 % (ref 37.5–51)
HGB BLD-MCNC: 9.9 G/DL (ref 13–17.7)
IMM GRANULOCYTES # BLD AUTO: 0.04 10*3/MM3 (ref 0–0.05)
IMM GRANULOCYTES NFR BLD AUTO: 0.4 % (ref 0–0.5)
LYMPHOCYTES # BLD AUTO: 1.39 10*3/MM3 (ref 0.7–3.1)
LYMPHOCYTES NFR BLD AUTO: 14.7 % (ref 19.6–45.3)
MAGNESIUM SERPL-MCNC: 1.9 MG/DL (ref 1.6–2.4)
MCH RBC QN AUTO: 28 PG (ref 26.6–33)
MCHC RBC AUTO-ENTMCNC: 32.1 G/DL (ref 31.5–35.7)
MCV RBC AUTO: 87.3 FL (ref 79–97)
MONOCYTES # BLD AUTO: 1.51 10*3/MM3 (ref 0.1–0.9)
MONOCYTES NFR BLD AUTO: 16 % (ref 5–12)
NEUTROPHILS NFR BLD AUTO: 6.25 10*3/MM3 (ref 1.7–7)
NEUTROPHILS NFR BLD AUTO: 66.2 % (ref 42.7–76)
NRBC BLD AUTO-RTO: 0 /100 WBC (ref 0–0.2)
PHOSPHATE SERPL-MCNC: 3.6 MG/DL (ref 2.5–4.5)
PLATELET # BLD AUTO: 266 10*3/MM3 (ref 140–450)
PMV BLD AUTO: 9.9 FL (ref 6–12)
POTASSIUM SERPL-SCNC: 4.6 MMOL/L (ref 3.5–5.2)
RBC # BLD AUTO: 3.53 10*6/MM3 (ref 4.14–5.8)
SODIUM SERPL-SCNC: 129 MMOL/L (ref 136–145)
UFH PPP CHRO-ACNC: 0.4 IU/ML (ref 0.3–0.7)
UNIT  ABO: NORMAL
UNIT  RH: NORMAL
WBC # BLD AUTO: 9.45 10*3/MM3 (ref 3.4–10.8)

## 2021-03-11 PROCEDURE — 71045 X-RAY EXAM CHEST 1 VIEW: CPT

## 2021-03-11 PROCEDURE — 97535 SELF CARE MNGMENT TRAINING: CPT

## 2021-03-11 PROCEDURE — 97530 THERAPEUTIC ACTIVITIES: CPT

## 2021-03-11 PROCEDURE — 97116 GAIT TRAINING THERAPY: CPT

## 2021-03-11 PROCEDURE — 82962 GLUCOSE BLOOD TEST: CPT

## 2021-03-11 PROCEDURE — 80069 RENAL FUNCTION PANEL: CPT | Performed by: INTERNAL MEDICINE

## 2021-03-11 PROCEDURE — 63710000001 DRONABINOL PER 2.5 MG: Performed by: INTERNAL MEDICINE

## 2021-03-11 PROCEDURE — 25010000002 HEPARIN (PORCINE) 25000-0.45 UT/250ML-% SOLUTION: Performed by: INTERNAL MEDICINE

## 2021-03-11 PROCEDURE — 85520 HEPARIN ASSAY: CPT

## 2021-03-11 PROCEDURE — 83735 ASSAY OF MAGNESIUM: CPT | Performed by: INTERNAL MEDICINE

## 2021-03-11 PROCEDURE — 99232 SBSQ HOSP IP/OBS MODERATE 35: CPT | Performed by: FAMILY MEDICINE

## 2021-03-11 PROCEDURE — 99232 SBSQ HOSP IP/OBS MODERATE 35: CPT | Performed by: INTERNAL MEDICINE

## 2021-03-11 PROCEDURE — 99231 SBSQ HOSP IP/OBS SF/LOW 25: CPT | Performed by: PHYSICIAN ASSISTANT

## 2021-03-11 PROCEDURE — 85025 COMPLETE CBC W/AUTO DIFF WBC: CPT | Performed by: INTERNAL MEDICINE

## 2021-03-11 RX ORDER — AMOXICILLIN 250 MG
2 CAPSULE ORAL 2 TIMES DAILY
Status: DISCONTINUED | OUTPATIENT
Start: 2021-03-11 | End: 2021-03-12 | Stop reason: HOSPADM

## 2021-03-11 RX ADMIN — APIXABAN 10 MG: 5 TABLET, FILM COATED ORAL at 20:35

## 2021-03-11 RX ADMIN — SODIUM CHLORIDE, PRESERVATIVE FREE 10 ML: 5 INJECTION INTRAVENOUS at 20:37

## 2021-03-11 RX ADMIN — DRONABINOL 5 MG: 2.5 CAPSULE ORAL at 16:39

## 2021-03-11 RX ADMIN — HEPARIN SODIUM 24 UNITS/KG/HR: 10000 INJECTION, SOLUTION INTRAVENOUS at 00:16

## 2021-03-11 RX ADMIN — DOCUSATE SODIUM 50MG AND SENNOSIDES 8.6MG 2 TABLET: 8.6; 5 TABLET, FILM COATED ORAL at 16:39

## 2021-03-11 RX ADMIN — MIDODRINE HYDROCHLORIDE 10 MG: 10 TABLET ORAL at 13:04

## 2021-03-11 RX ADMIN — DRONABINOL 5 MG: 2.5 CAPSULE ORAL at 05:52

## 2021-03-11 RX ADMIN — MIDODRINE HYDROCHLORIDE 10 MG: 10 TABLET ORAL at 05:52

## 2021-03-11 RX ADMIN — ACETAMINOPHEN 650 MG: 325 TABLET ORAL at 05:52

## 2021-03-11 RX ADMIN — APIXABAN 10 MG: 5 TABLET, FILM COATED ORAL at 13:04

## 2021-03-11 RX ADMIN — DOCUSATE SODIUM 50MG AND SENNOSIDES 8.6MG 2 TABLET: 8.6; 5 TABLET, FILM COATED ORAL at 21:03

## 2021-03-11 RX ADMIN — MIDODRINE HYDROCHLORIDE 10 MG: 10 TABLET ORAL at 20:36

## 2021-03-11 RX ADMIN — MAGNESIUM HYDROXIDE 10 ML: 2400 SUSPENSION ORAL at 16:39

## 2021-03-11 RX ADMIN — LINAGLIPTIN 5 MG: 5 TABLET, FILM COATED ORAL at 09:09

## 2021-03-11 NOTE — PROGRESS NOTES
[facility] Medicine Services  PROGRESS NOTE    Patient Name: Danny Alvarez Jr.  : 1945  MRN: [id number]    Date of Admission: 3/1/2021  Primary Care Physician: Jef Beckham MD    Subjective   Subjective     CC:  Bilateral malignant effusions, portal vein thrombosis    HPI:  No complaints.  Pain well controlled.  Denies SOA at rest, minimal BERMUDEZ getting up to chair.      ROS:  Gen- No fevers, chills, HA, uncontrolled pain  CV- No chest pain, palpitations, new edema  Resp- No SOA, cough  GI- No N/V/D, abd pain, constipation  Skin - No rash         Objective   Objective     Vital Signs:   Temp:  [97.4 °F (36.3 °C)-97.9 °F (36.6 °C)] 97.9 °F (36.6 °C)  Heart Rate:  [69-84] 70  Resp:  [16-18] 18  BP: ()/(62-71) 97/71        Physical Exam:  Constitutional: No acute distress, awake, alert, nontoxic, normal body habitus  Respiratory: Dull bases auscultation bilaterally, good effort, nonlabored respirations   Cardiovascular: RRR  Gastrointestinal: Currently nondistended  Musculoskeletal: No peripheral edema, normal muscle tone for age  Psychiatric: Appropriate affect, good insight and judgement, cooperative      Results Reviewed:  Results from last 7 days   Lab Units 21  0541 03/10/21  0355 21  0407   WBC 10*3/mm3 9.45 9.62 7.19   HEMOGLOBIN g/dL 9.9* 9.7* 9.8*   HEMATOCRIT % 30.8* 31.4* 31.0*   PLATELETS 10*3/mm3 266 250 230     Results from last 7 days   Lab Units 21  0541 03/10/21  0355 21  0407   SODIUM mmol/L 129* 133* 131*   POTASSIUM mmol/L 4.6 4.8 4.7   CHLORIDE mmol/L 95* 98 96*   CO2 mmol/L 27.0 31.0* 30.0*   BUN mg/dL 13 13 9   CREATININE mg/dL 0.51* 0.62* 0.55*   GLUCOSE mg/dL 150* 160* 152*   CALCIUM mg/dL 8.4* 8.6 8.5*     Estimated Creatinine Clearance: 84.6 mL/min (A) (by C-G formula based on SCr of 0.51 mg/dL (L)).    Microbiology Results Abnormal     Procedure Component Value - Date/Time    AFB Culture - Body Fluid, Pleural Cavity  [[id number]] Collected: 03/01/21 1626    Lab Status: Preliminary result Specimen: Body Fluid from Pleural Cavity Updated: 03/08/21 1645     AFB Culture No AFB isolated at 1 week     AFB Stain No acid fast bacilli seen on concentrated smear    Fungus Culture - Body Fluid, Pleural Cavity [[id number]] Collected: 03/01/21 1626    Lab Status: Preliminary result Specimen: Body Fluid from Pleural Cavity Updated: 03/08/21 1645     Fungus Culture No fungus isolated at 1 week    Blood Culture - Blood, Arm, Left [[id number]] Collected: 03/01/21 1234    Lab Status: Final result Specimen: Blood from Arm, Left Updated: 03/06/21 1400     Blood Culture No growth at 5 days    Blood Culture - Blood, Arm, Left [[id number]] Collected: 03/01/21 1230    Lab Status: Final result Specimen: Blood from Arm, Left Updated: 03/06/21 1400     Blood Culture No growth at 5 days    Anaerobic Culture - Pleural Fluid, Pleural Cavity [[id number]] Collected: 03/01/21 1626    Lab Status: Final result Specimen: Pleural Fluid from Pleural Cavity Updated: 03/06/21 1039     Anaerobic Culture No anaerobes isolated at 5 days    Body Fluid Culture - Body Fluid, Pleural Cavity [[id number]] Collected: 03/01/21 1626    Lab Status: Final result Specimen: Body Fluid from Pleural Cavity Updated: 03/04/21 0716     Body Fluid Culture No growth at 3 days     Gram Stain Moderate (3+) WBCs per low power field      No organisms seen    COVID PRE-OP / PRE-PROCEDURE SCREENING ORDER (NO ISOLATION) - Swab, Nasopharynx [[id number]]  (Normal) Collected: 03/01/21 1236    Lab Status: Final result Specimen: Swab from Nasopharynx Updated: 03/01/21 1341    Narrative:      The following orders were created for panel order COVID PRE-OP / PRE-PROCEDURE SCREENING ORDER (NO ISOLATION) - Swab, Nasopharynx.  Procedure                               Abnormality         Status                     ---------                               -----------         ------                     COVID-19 and FLU  A/B PCR...[[id number]]  Normal              Final result                 Please view results for these tests on the individual orders.    COVID-19 and FLU A/B PCR - Swab, Nasopharynx [[id number]]  (Normal) Collected: 03/01/21 1236    Lab Status: Final result Specimen: Swab from Nasopharynx Updated: 03/01/21 1341     COVID19 Not Detected     Influenza A PCR Not Detected     Influenza B PCR Not Detected    Narrative:      Fact sheet for providers: https://www.fda.gov/media/868192/download    Fact sheet for patients: https://www.fda.gov/media/470174/download    Test performed by PCR.          Imaging Results (Last 24 Hours)     Procedure Component Value Units Date/Time    XR Chest 1 View [[id number]] Collected: 03/11/21 0828     Updated: 03/11/21 0831    Narrative:      EXAMINATION: XR CHEST 1 VW-      INDICATION: Left effusion, left SBCT; N85-Tprjxvv effusion, not  elsewhere classified; R93.5-Abnormal findings on diagnostic imaging of  other abdominal regions, including retroperitoneum; R06.00-Dyspnea,  unspecified; R77.8-Other specified abnormalities of plasma proteins.      COMPARISON: Chest x-ray 03/10/2021.     FINDINGS: Support hardware unchanged with persistent lucency and  surrounding opacifications at the left lung base unchanged from prior  with a left at least moderate volume pneumothorax similar in appearance.            Impression:      Similar appearance of the left moderate volume pneumothorax  with bibasilar predominance and surrounding opacifications similar to  prior comparison.     D:  03/11/2021  E:  03/11/2021                Results for orders placed during the hospital encounter of 03/01/21    Adult Transthoracic Echo Complete W/ Cont if Necessary Per Protocol    Interpretation Summary  · Left ventricular ejection fraction appears to be 41 - 45%. Left ventricular systolic function is moderately decreased.  · Left ventricular diastolic function is consistent with (grade II w/high LAP)  pseudonormalization.  · The left ventricular cavity is mildly dilated.  · Left ventricular wall thickness is consistent with mild concentric hypertrophy.  · Moderate tricuspid valve regurgitation is present.  · Estimated right ventricular systolic pressure from tricuspid regurgitation is markedly elevated (>55 mmHg).  · Moderate mitral valve regurgitation is present.  · There is a left pleural effusion.  · There is a small (<1cm) pericardial effusion.      I have reviewed the medications:  Scheduled Meds:dronabinol, 5 mg, Oral, BID  linagliptin, 5 mg, Oral, Daily  midodrine, 10 mg, Oral, Q8H  sodium chloride, 10 mL, Intravenous, Q12H      Continuous Infusions:heparin, 24 Units/kg/hr, Last Rate: 24 Units/kg/hr (03/11/21 0016)  lactated ringers, 9 mL/hr, Last Rate: 9 mL/hr (03/09/21 1415)  Pharmacy to Dose Heparin,       PRN Meds:.•  acetaminophen **OR** acetaminophen **OR** acetaminophen  •  bisacodyl  •  bisacodyl  •  calcium carbonate  •  dextrose  •  dextrose  •  glucagon (human recombinant)  •  influenza vaccine  •  ketorolac  •  magnesium sulfate **OR** magnesium sulfate **OR** magnesium sulfate  •  melatonin  •  ondansetron **OR** ondansetron  •  Pharmacy to Dose Heparin  •  potassium & sodium phosphates **OR** potassium & sodium phosphates  •  potassium chloride **OR** potassium chloride **OR** potassium chloride  •  senna-docusate sodium  •  sodium chloride    Assessment/Plan   Assessment & Plan     Active Hospital Problems    Diagnosis  POA   • **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        Brief Hospital Course to date:  Danny Alvarez Jr. is a 75 y.o. male with a past medical history for hypertension, diabetes mellitus, systolic heart failure (EF 41%), and portal vein thrombosis presented to [facility] on 3/1/2021 for dyspnea.  CTA the chest with large bilateral effusions, CT abdomen pelvis revealed a portal vein thrombosis with concerns for omental metastasis, ECHO showing EF of 41% and diastolic dysfunction.      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  3/4/2021 lower extremity Doppler showed a DVT of the peroneal on the right.      CT of the chest from 3/7/2021 showed mod-large right effusion and the left-sided effusion loculated with continued trap left lung and a hydropneumothorax, therefore CTS (Dr. Reyes) operatively placed right Pleurex and left chest tube on 3/9/21.      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 Desouza of Oncology follows, likely plan for close office f/u at d/c    Portal vein thrombosis  RLE DVT  Hypercoagulability of malignancy  - stop hep gtt and transition to PO Eliquis  - indefinite anticoagulation unless absolute contraindication arises in future    Hyponatremia  - wax/wanes related to fluis shifts  - overall fairly stable  - asymptomatic    Hx of HTN but currently with chronic hypotension (fluid shifts etc)  - continue midodrine  - euvolemic, normal BUN:creat ratio    DM2  -  restart home Metformin at d/c  - patient declined insulin while inpatient so was placed on Tradjenta while here      DVT Prophylaxis:  Eliquis      Disposition: I expect the patient to be discharged tomorrow with right Pleurex in place and close Oncology office f/u for treatment option exploration.    CODE STATUS:   Code Status and Medical Interventions:   Ordered at: 03/01/21 1710     Code Status:    CPR     Medical Interventions (Level of Support Prior to Arrest):    Full       Ailyn Paris MD  03/11/21

## 2021-03-11 NOTE — PROGRESS NOTES
"Danny Alvarez Jr.  [id number]  1945     LOS: 10 days   Patient Care Team:  Jef Beckham MD as PCP - General (Internal Medicine)    Chief Complaint: Pleural effusions      Subjective: No specific complaints today    Objective:     Vital Sign Min/Max for last 24 hours  Temp  Min: 97.4 °F (36.3 °C)  Max: 97.8 °F (36.6 °C)   BP  Min: 74/52  Max: 110/69   Pulse  Min: 67  Max: 84   Resp  Min: 16  Max: 18   SpO2  Min: 92 %  Max: 98 %   No data recorded   Weight  Min: 75 kg (165 lb 6.4 oz)  Max: 75.1 kg (165 lb 8 oz)     Flowsheet Rows      First Filed Value   Admission Height  177.8 cm (70\") Documented at 03/01/2021 1027   Admission Weight  74.8 kg (165 lb) Documented at 03/01/2021 1027          Physical Exam:    Wound:    Pulses:     Mediastinal and Chest Tube Drainage: Chest tube no air leak       Results Review:   Results from last 7 days   Lab Units 03/11/21  0541   WBC 10*3/mm3 9.45   HEMOGLOBIN g/dL 9.9*   HEMATOCRIT % 30.8*   PLATELETS 10*3/mm3 266     Results from last 7 days   Lab Units 03/11/21  0541   SODIUM mmol/L 129*   POTASSIUM mmol/L 4.6   CHLORIDE mmol/L 95*   CO2 mmol/L 27.0   BUN mg/dL 13   CREATININE mg/dL 0.51*   GLUCOSE mg/dL 150*   CALCIUM mg/dL 8.4*             Assessment      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  Patient may go home from my point of view tomorrow if all agree.  Discussed fully with patient and answered all of his questions.        Richy Reyes MD  03/11/21  07:01 EST      Please note that portions of this note were completed with a voice recognition program. Efforts " were made to edit the dictations, but words may be mistranscribed

## 2021-03-11 NOTE — PROGRESS NOTES
Continued Stay Note  [facility]     Patient Name: Danny Alvarez Jr.  MRN: [id number]  Today's Date: 3/11/2021    Admit Date: 3/1/2021    Discharge Plan     Row Name 03/11/21 1415       Plan    Plan  Home with [facility]    Patient/Family in Agreement with Plan  yes    Plan Comments  I have met with Mr. Alvarez and his wife, Sania today at the bedside to discuss possible discharge tomorrow.  He currently has one right pleurx drain and may possibly be medically ready for discharge tomorrow.  I have faxed final pleurx orders and insurance info to [employer] @ [phone]. I will call and confirm receipt later today.  I have notified Jem with [facility] and they have accepted the referral.  Sania, Mr. Alvarez's wife is scheduled to have her covid vaccine in the morning before coming to visit and is planning to transport him home by car.  PT recommends home with assistance.  Per PT recommendations I have placed orders and faxed them to [employer] for a rollator walker and a bedside commode for use after discharge.  Case Management will follow Mr. Alvarez's plan of care and continue to assist with this discharge plan as needed.    Final Discharge Disposition Code  30 - still a patient        Discharge Codes    No documentation.       Expected Discharge Date and Time     Expected Discharge Date Expected Discharge Time    Mar 12, 2021             Laura Gibbs RN

## 2021-03-11 NOTE — PLAN OF CARE
Goal Outcome Evaluation:  Plan of Care Reviewed With: patient  Progress: improving  Outcome Summary: Pt supervision to don socks, SBA sinkside grooming.  Pt supervision STS,  CGA to ambulate in hallway without AD given 2 standing restbreaks to stretch back.  Pt with good tolerance to complete 15 reps BUE TE.

## 2021-03-11 NOTE — PROGRESS NOTES
Patient is on Apixaban.  Education provided on Apixaban verbally and in writing.  Information provided includes effects of medication, drug-drug and drug-food interactions, and signs/symptoms of bleeding and clotting.  Patient verbalized understanding through teach back.  All pertinent questions were answered.     Shonda Colby, PharmD

## 2021-03-11 NOTE — PLAN OF CARE
Goal Outcome Evaluation:  Plan of Care Reviewed With: patient     Outcome Summary: VSS. Patient on heparin drip. Patient slpet intermittengly throughout shift.

## 2021-03-11 NOTE — DISCHARGE PLACEMENT REQUEST
"Case Management  [phone]    Kathleen Solorzano Jr. (75 y.o. Male)     Date of Birth Social Security Number Address Home Phone MRN    1945  [address] [facility] [address] [phone] [id number]    Oriental orthodox Marital Status          None        Admission Date Admission Type Admitting Provider Attending Provider Department, Room/Bed    3/1/21 Emergency Ailyn Paris MD Hall, Holly, MD [address], S567/1    Discharge Date Discharge Disposition Discharge Destination                       Attending Provider: Ailyn Paris MD    Allergies: No Known Allergies    Isolation: None   Infection: None   Code Status: CPR    Ht: 177.8 cm (70\")   Wt: 75 kg (165 lb 6.4 oz)    Admission Cmt: None   Principal Problem: 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 1945 [id number]           Secondary Coverage     Payor Plan Insurance Group Employer/Plan Group    ANTH BLUE CROSS [facility] SUPP [id number]     Payor Plan Address Payor Plan Phone Number Payor Plan Fax Number Effective Dates    [address]   12/1/2016 - None Entered    [facility] [id number]       Subscriber Name Subscriber Birth Date Member ID       KATHLEEN SOLORZANO JR. 1945 [id number]                 Emergency Contacts      (Rel.) Home Phone Work Phone Mobile Phone    ARGELIA SOLORZANO (Spouse) [phone] -- --        [address]  [address] [facility] [address]  Dept. Phone:  [phone]  Dept. Fax:  [phone] Date Ordered: Mar 11, 2021         Patient:  Kathleen Solorzano Jr. MRN:  " "[id number]   [address] [facility] [address] :  1945  SSN:    Phone: [phone] Sex:  M     Weight: 75 kg (165 lb 6.4 oz)         Ht Readings from Last 1 Encounters:   21 177.8 cm (70\")         Commode Chair          (Order ID: [id number])    Diagnosis:  Pleural effusion (J90 [ICD-10-CM] 511.9 [ICD-9-CM])  Cancer of pancreatic duct (CMS/HCC) (C25.3 [ICD-10-CM] 157.3 [ICD-9-CM])  Acute on chronic combined systolic and diastolic CHF (congestive heart failure) (CMS/HCC) (I50.43 [ICD-10-CM] 428.43,428.0 [ICD-9-CM])  Type 2 diabetes mellitus without complication, unspecified whether long term insulin use (CMS/HCC) (E11.9 [ICD-10-CM] 250.00 [ICD-9-CM])  Pleural effusion on left (J90 [ICD-10-CM] 511.9 [ICD-9-CM])  Other ascites (R18.8 [ICD-10-CM] 789.59 [ICD-9-CM])   Quantity:  1     Equipment:  Bedside Commode Chair w/Fixed Arms  Length of Need (99 Months = Lifetime): 15 Months        Authorizing Provider's Phone: [phone]   Authorizing Provider:Ailyn Paris MD  Authorizing Provider's NPI: [id number]  Order Entered By: Laura Gibbs RN 3/11/2021  2:38 PM     Electronically signed by: Ailyn Paris MD 3/11/2021  2:38 PM        [address] [address]  Dept. Phone:  [phone]  Dept. Fax:  [phone] Date Ordered: Mar 11, 2021         Patient:  Danny Alvarez Jr. MRN:  [id number]   [address] [facility] [address] :  1945  SSN:    Phone: [phone] Sex:  M     Weight: 75 kg (165 lb 6.4 oz)         Ht Readings from Last 1 Encounters:   21 177.8 cm (70\")         Nacho               (Order ID: [id number])    Diagnosis:  Dyspnea on exertion (R06.00 [ICD-10-CM] 786.09 [ICD-9-CM])  Cancer of pancreatic duct (CMS/HCC) (C25.3 [ICD-10-CM] 157.3 [ICD-9-CM])  Pleural effusion (J90 [ICD-10-CM] 511.9 [ICD-9-CM])  Acute on chronic combined systolic and diastolic CHF (congestive heart failure) (CMS/HCC) (I50.43 " [ICD-10-CM] 428.43,428.0 [ICD-9-CM])  Type 2 diabetes mellitus without complication, unspecified whether long term insulin use (CMS/[facility]) (E11.9 [ICD-10-CM] 250.00 [ICD-9-CM])  Pleural effusion on left (J90 [ICD-10-CM] 511.9 [ICD-9-CM])   Quantity:  1     Equipment:  Walker Folding with Wheels  Accessories:  Seat Attachment, Walker  Length of Need (99 Months = Lifetime): 15 Months (this order is for a rollator)        Authorizing Provider's Phone: [phone]   Authorizing Provider:Ailyn Paris MD  Authorizing Provider's NPI: [id number]  Order Entered By: Laura Gibbs RN 3/11/2021  2:37 PM     Electronically signed by: Ailyn Paris MD 3/11/2021  2:37 PM                 History & Physical      Cassi Kaplan MD at [address] [place] [address] Medicine Services  HISTORY AND PHYSICAL    Patient Name: Danny Alvarez Jr.  : 1945  MRN: [id number]  Primary Care Physician: Jef Beckham MD  Date of admission: 3/1/2021      Subjective   Subjective     Chief Complaint:  SOA    HPI:  Danny Alvarez Jr. is a 75 y.o. male with PMH significant for HTN and hyperglycemia.  He presented to the ER with a 2-3 month history of dyspnea, but much worsened over the last 2 weeks.  He has noted especially SOA when he attempts to climb his stairs at home.  His daughter works here and took him a pulse oximeter and yesterday he measured his O2 sat on RA at 60% at the top of the stairs.  Presently he is comfortable and satting in the low 90's.  He has not had fever, but has had a cough and has tried Mucinex for that.  He has had some mild but persistent nausea and sour brash for the last several months.  He has had some mild dependent edema in his BLE for 2 months as well as some tingling in his fingers.  He has lost 50# over some undefined period of time, which he attributes to quitting a stressful job.  He had one episode of explosive diarrhea about 10 days ago and has not had any  "since.  Of note, he is on antibiotics for what he is calling and \"infected root canal\" and is on his 6th day of therapy.  He also had his second COVID vaccine on 2/27/21.          COVID Details: [] No Symptoms  Symptoms: [] Fever [x]  Cough [x] Shortness of breath [] Change in taste or smell  Risks:  [] Direct Exposure [] High risk facility   Date of Onset:  ____  Date of first positive COVID test:     Review of Systems   Per HPI - all otherwise negative.  All other systems reviewed and are negative.     Personal History     Past Medical History:   Diagnosis Date   • Diabetes mellitus (CMS/HCC)    • Hypertension        Past Surgical History:   Procedure Laterality Date   • CLAVICLE SURGERY         Family History: family history is not on file. Otherwise pertinent FHx was reviewed and unremarkable.     Social History:  reports that he has never smoked. He has never used smokeless tobacco. He reports current alcohol use. Drug use questions deferred to the physician.  Social History     Social History Narrative   • Not on file       Medications:  Available home medication information reviewed.  Medications Prior to Admission   Medication Sig Dispense Refill Last Dose   • enalapril (VASOTEC) 10 MG tablet Take 10 mg by mouth Daily.   Unknown at Unknown time   • metFORMIN (GLUCOPHAGE) 500 MG tablet Take 500 mg by mouth 2 (Two) Times a Day With Meals.   Unknown at Unknown time       No Known Allergies    Objective   Objective     Vital Signs:   Temp:  [97.7 °F (36.5 °C)-97.8 °F (36.6 °C)] 97.7 °F (36.5 °C)  Heart Rate:  [71-94] 77  Resp:  [16-20] 18  BP: (105-151)/() 117/69  Flow (L/min):  [5] 5       Physical Exam   Constitutional: Awake, alert, pleasant  Eyes: PERRLA, sclerae anicteric, no conjunctival injection  HENT: NCAT, mucous membranes moist  Neck: Supple, no thyromegaly, no lymphadenopathy, trachea midline  Respiratory: markedly diminished left, nonlabored respirations   Cardiovascular: RRR, no murmurs, " "rubs, or gallops, palpable pedal pulses bilaterally  Gastrointestinal: Positive bowel sounds, soft, nontender, nondistended  Musculoskeletal: Tr bilateral ankle edema, no clubbing or cyanosis to extremities  Psychiatric: Appropriate affect, cooperative  Neurologic: Oriented x 3, strength symmetric in all extremities, Cranial Nerves grossly intact to confrontation, speech clear  Skin: No rashes      Result Review:  I have personally reviewed the results from the time of this admission to 03/01/21 5:11 PM EST and agree with these findings:  [x]  Laboratory  []  Microbiology  [x]  Radiology  []  EKG/Telemetry   []  Cardiology/Vascular   []  Pathology  []  Old records  []  Other:  Most notable findings include: troponin 0.031.    \"large left pleural effusion   occupying most of the left hemithorax, with only a small amount of remaining aerated left lung. There is also a large right pleural effusion larger than it appears on plain film\"      LAB RESULTS:      Lab 03/01/21  1511 03/01/21  1234 03/01/21  1100   WBC  --   --  10.75   HEMOGLOBIN  --   --  15.0   HEMATOCRIT  --   --  47.6   PLATELETS  --   --  240   NEUTROS ABS  --   --  8.41*   IMMATURE GRANS (ABS)  --   --  0.05   LYMPHS ABS  --   --  1.17   MONOS ABS  --   --  0.96*   EOS ABS  --   --  0.08   MCV  --   --  88.8   PROCALCITONIN  --   --  0.04   LACTATE  --   --  1.9   LDH  --  134*  --    PROTIME 13.9  --   --          Lab 03/01/21  1100   SODIUM 141   POTASSIUM 4.2   CHLORIDE 101   CO2 28.0   ANION GAP 12.0   BUN 18   CREATININE 0.72*   GLUCOSE 172*   CALCIUM 9.3         Lab 03/01/21  1100   TOTAL PROTEIN 6.9   ALBUMIN 3.90   GLOBULIN 3.0   ALT (SGPT) 20   AST (SGOT) 25   BILIRUBIN 0.7   ALK PHOS 184*   LIPASE 14         Lab 03/01/21  1511 03/01/21  1234 03/01/21  1100   PROBNP  --   --  809.8   TROPONIN T  --  0.031* 0.031*   PROTIME 13.9  --   --    INR 1.2  --   --                  Brief Urine Lab Results     None        Microbiology Results (last 10 " days)     Procedure Component Value - Date/Time    COVID PRE-OP / PRE-PROCEDURE SCREENING ORDER (NO ISOLATION) - Swab, Nasopharynx [[id number]]  (Normal) Collected: 03/01/21 1236    Lab Status: Final result Specimen: Swab from Nasopharynx Updated: 03/01/21 1341    Narrative:      The following orders were created for panel order COVID PRE-OP / PRE-PROCEDURE SCREENING ORDER (NO ISOLATION) - Swab, Nasopharynx.  Procedure                               Abnormality         Status                     ---------                               -----------         ------                     COVID-19 and FLU A/B PCR...[[id number]]  Normal              Final result                 Please view results for these tests on the individual orders.    COVID-19 and FLU A/B PCR - Swab, Nasopharynx [[id number]]  (Normal) Collected: 03/01/21 1236    Lab Status: Final result Specimen: Swab from Nasopharynx Updated: 03/01/21 1341     COVID19 Not Detected     Influenza A PCR Not Detected     Influenza B PCR Not Detected    Narrative:      Fact sheet for providers: https://www.fda.gov/media/461607/download    Fact sheet for patients: https://www.fda.gov/media/372116/download    Test performed by PCR.          Ct Abdomen Pelvis With Contrast    Result Date: 3/1/2021  EXAMINATION: CT ANGIOGRAM CHEST-, CT ABDOMEN/PELVIS W CONTRAST-03/01/2021:  INDICATION: Severe dyspnea on exertion; large pleural effusion on CXR.  TECHNIQUE: Spiral acquisition 3 mm post-IV contrast images through the chest with sagittal and coronal 2-D reconstructions, and 5 mm post-IV contrast portal venous phase and delayed venous phase images through the abdomen and pelvis.  The radiation dose reduction device was turned on for each scan per the ALARA (As Low as Reasonably Achievable) protocol.  COMPARISON: Portable chest radiograph of same date.  FINDINGS:  CT ANGIOGRAM CHEST:  There is a very large left pleural effusion occupying most of the left hemithorax, with only a small  amount of remaining aerated left lung. There is also a large right pleural effusion larger than it appears on plain film. Mediastinal window images show no evidence of mediastinal mass or adenopathy. There is good contrast opacification of the pulmonary arteries and no evidence of pulmonary embolic disease. The remaining non-atelectatic portions of the lungs appear clear except for mild nonspecific interstitial changes.      Impression: 1. No evidence of pulmonary embolus. 2. Very large left pleural effusion, and atelectasis of most of the left lung. 3. Large right pleural effusion with atelectasis of a moderate amount of right lower lobe. Minor nonspecific interstitial lung changes seen elsewhere.  ABDOMEN AND PELVIS CT SCAN WITH IV CONTRAST: There is mild-to- moderate ascites, adjacent the liver and spleen and in the upper paracolic gutters. There is a very small amount of intrapelvic free fluid. There is diffuse fatty liver change, and somewhat asymmetric enhancement pattern of the right and left liver lobes initially, but uniform and normal in appearance of the liver on the delayed series. As can be appreciated on images 25-30, there is dense opacification of the main portal vein, right portal vein, splenic vein with contrast, but no visible portal vein contrast in the left portal vein, suggesting thrombosis and occlusion. There is perhaps mild narrowing of the distal main portal vein at the confluence of the right and left portal veins, axial image 29-series 8. Superior mesenteric vein is incompletely contrast opacified but this may be due to venous mixing, rather than thrombus.  The spleen is in the upper range of normal at 12.5 cm. There is upper abdomen fat stranding although this may represent edema. The appearance somewhat resembles omental metastatic disease, particularly in the right mid abdomen, images 46-49 of series 8. The bowel loops are normal in caliber. There is a large simple appearing right  renal cyst. The kidneys otherwise appear unremarkable. The pancreas, gallbladder, and adrenal glands appear unremarkable.  Regarding the lower abdomen and pelvis, no mass or adenopathy is seen. There is a relatively small amount of pelvic ascites. Delayed venous phase images show contrast opacification of normal caliber renal collecting systems, ureters and bladder. The bony structures appear grossly intact.  IMPRESSION: 1. Mild-to-moderate ascites. 2. Apparently thrombosed left portal vein, with no visible portal vein contrast in the left lobe, and delayed parenchymal enhancement as a result. 3. Rather dense reticular disease of the right upper quadrant omental fat, possibly related to the patient's ascites but worrisome for omental metastatic disease. Similar milder changes of the omental fat elsewhere.  NOTE: Preliminary findings of this exam were discussed with the ordering provider, MARK Travis, at 2:20 PM 03/01/2021.  D:  03/01/2021 E:  03/01/2021       Xr Chest 1 View    Result Date: 3/1/2021  EXAMINATION: XR CHEST 1 VW-  INDICATION: Chest pain triage protocol.  COMPARISON: None.  FINDINGS: There is opacification of most of the left hemithorax by pleural effusion. There is moderately extensive right basilar atelectasis plus/minus effusion. The heart is enlarged. The vasculature appears upper normal size.      Impression: 1. Opacification of most of the left hemithorax, apparently by pleural effusion. 2. Moderately extensive right basilar atelectasis and effusion. 3. Cardiomegaly without evidence of overt congestive failure.   D:  03/01/2021 E:  03/01/2021      Ct Angiogram Chest    Result Date: 3/1/2021  EXAMINATION: CT ANGIOGRAM CHEST-, CT ABDOMEN/PELVIS W CONTRAST-03/01/2021:  INDICATION: Severe dyspnea on exertion; large pleural effusion on CXR.  TECHNIQUE: Spiral acquisition 3 mm post-IV contrast images through the chest with sagittal and coronal 2-D reconstructions, and 5 mm post-IV contrast portal  venous phase and delayed venous phase images through the abdomen and pelvis.  The radiation dose reduction device was turned on for each scan per the ALARA (As Low as Reasonably Achievable) protocol.  COMPARISON: Portable chest radiograph of same date.  FINDINGS:  CT ANGIOGRAM CHEST:  There is a very large left pleural effusion occupying most of the left hemithorax, with only a small amount of remaining aerated left lung. There is also a large right pleural effusion larger than it appears on plain film. Mediastinal window images show no evidence of mediastinal mass or adenopathy. There is good contrast opacification of the pulmonary arteries and no evidence of pulmonary embolic disease. The remaining non-atelectatic portions of the lungs appear clear except for mild nonspecific interstitial changes.      Impression: 1. No evidence of pulmonary embolus. 2. Very large left pleural effusion, and atelectasis of most of the left lung. 3. Large right pleural effusion with atelectasis of a moderate amount of right lower lobe. Minor nonspecific interstitial lung changes seen elsewhere.  ABDOMEN AND PELVIS CT SCAN WITH IV CONTRAST: There is mild-to- moderate ascites, adjacent the liver and spleen and in the upper paracolic gutters. There is a very small amount of intrapelvic free fluid. There is diffuse fatty liver change, and somewhat asymmetric enhancement pattern of the right and left liver lobes initially, but uniform and normal in appearance of the liver on the delayed series. As can be appreciated on images 25-30, there is dense opacification of the main portal vein, right portal vein, splenic vein with contrast, but no visible portal vein contrast in the left portal vein, suggesting thrombosis and occlusion. There is perhaps mild narrowing of the distal main portal vein at the confluence of the right and left portal veins, axial image 29-series 8. Superior mesenteric vein is incompletely contrast opacified but this  may be due to venous mixing, rather than thrombus.  The spleen is in the upper range of normal at 12.5 cm. There is upper abdomen fat stranding although this may represent edema. The appearance somewhat resembles omental metastatic disease, particularly in the right mid abdomen, images 46-49 of series 8. The bowel loops are normal in caliber. There is a large simple appearing right renal cyst. The kidneys otherwise appear unremarkable. The pancreas, gallbladder, and adrenal glands appear unremarkable.  Regarding the lower abdomen and pelvis, no mass or adenopathy is seen. There is a relatively small amount of pelvic ascites. Delayed venous phase images show contrast opacification of normal caliber renal collecting systems, ureters and bladder. The bony structures appear grossly intact.  IMPRESSION: 1. Mild-to-moderate ascites. 2. Apparently thrombosed left portal vein, with no visible portal vein contrast in the left lobe, and delayed parenchymal enhancement as a result. 3. Rather dense reticular disease of the right upper quadrant omental fat, possibly related to the patient's ascites but worrisome for omental metastatic disease. Similar milder changes of the omental fat elsewhere.  NOTE: Preliminary findings of this exam were discussed with the ordering provider, MARK Travis, at 2:20 PM 03/01/2021.  D:  03/01/2021 E:  03/01/2021       Ct Guided Thoracentesis    Result Date: 3/1/2021  PROCEDURE: CT-guided thoracentesis  Procedural Personnel Attending physician(s): YANI Graff M.D. Fellow physician(s): None Resident physician(s): None Advanced practice provider(s): None  Pre-procedure diagnosis: ER with SOA. Left >right pleural effusion Post-procedure diagnosis: Same Indication: Diagnostic/therapeutic Additional clinical history: None  Complications: No immediate complications.      Impression:  CT-guided thoracentesis with drainage of 2000 mL of dark yellow/cloudy fluid. Portion of fluid sent for requested  laboratory analysis.  Plan:  Resume care by clinical team. _______________________________________________________________  PROCEDURE SUMMARY: - Limited thoracic CT - CT-guided thoracentesis - Additional procedure(s): None  PROCEDURE DETAILS:  Pre-procedure Consent: Informed consent for the procedure including risks, benefits and alternatives was obtained and time-out was performed prior to the procedure. Preparation: The site was prepared and draped using maximal sterile barrier technique including cutaneous antisepsis.  Anesthesia/sedation Level of anesthesia/sedation: No sedation  Limited thoracic CT Limited thoracic CT was performed. A safe window for thoracentesis was identified. Left hemithorax findings: Large pleural fluid collection  Thoracentesis Local anesthesia was administered. The pleural space was accessed using trocar technique  and fluid return confirmed position. The fluid was drained. The catheter was removed, and a sterile bandage was applied. Catheter placed: 6.5 Gibraltarian nonlocking pigtail Post-drainage hemithorax findings: Significant residual pleural fluid collection. No immediate complication  Radiation Dose CT dose length product (mGy-cm): 423  Additional Details Additional description of procedure: None Equipment details: None Specimens removed: Pleural fluid Estimated blood loss (mL): Less than 10 Standardized report: Thoracentesis  Attestation I was present and scrubbed for the entire procedure. Imaging reviewed. Agree with final report as written.   This report was finalized on 3/1/2021 4:21 PM by Raheem Graff.             Assessment/Plan   Assessment & Plan     Active Hospital Problems    Diagnosis POA   • **Pleural effusion on left [J90] Yes   • Elevated troponin [R77.8] Yes   • Diabetes mellitus (CMS/HCC) [E11.9] Yes   • Ascites [R18.8] Yes   • Portal vein thrombosis [I81] Yes     Danny Alvarez Jr. is a 75 y.o. male with PMH significant for HTN and hyperglycemia who was admitted on  3/1/21 for very large left pleural effusion with associated dyspnea and hypoxia at home.  There is concern for malignant disease as there is dense reticular disease in the patient RUQ omental fat as well as thrombosed portal vein.    Thoracentesis today in IR, fluid studies ordered including cytology  Heparin drip due to portal vein thrombosis - pharmacy to dose  GI consultation in the AM  Further consultation depending on study results    Hb A1C in AM  SSI insulin per accucheck    Trend troponin  No chest pain      DVT prophylaxis:  Heparin drip      CODE STATUS:    Code Status and Medical Interventions:   Ordered at: 03/01/21 2175     Code Status:    CPR     Medical Interventions (Level of Support Prior to Arrest):    Full       Admission Status:  I believe this patient meets inpatient criteria due to new large pleural effusion requiring further workup and procedure, possible metastatic disease.    Cassi Kaplan MD  03/01/21      Electronically signed by Cassi Kaplan MD at 03/01/21 1393

## 2021-03-11 NOTE — PLAN OF CARE
Goal Outcome Evaluation:  Plan of Care Reviewed With: (P) patient     Outcome Summary: (P) Pt required SBA with STS t/f, CGA for gait, and min assist for stair navigation. Pt had a slight LOB with toilet t/f. Pt ambulated 280' with no AD and min-mod unsteadiness noted. Pt ascended/descended 15 steps with min assist for proper foot placement and balance correction. Pt encouraged to use AD during gait but declined.  Discussed rollator recommendation to pt and spouse with compliance to recommendation. D/C recommendation to IRF if compliant or home with assist and HH if non compliant to rehab.

## 2021-03-11 NOTE — PROGRESS NOTES
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 Still poor appetite.  Wife has arrived from [place].      REVIEW OF SYSTEMS:  A 14 point review of systems was performed and is negative except as noted above.      Objective     Vitals:    03/11/21 0600 03/11/21 0700 03/11/21 0908 03/11/21 1100   BP:   97/71    BP Location:   Right arm    Patient Position:   Lying    Pulse:  70 70 72   Resp:   18    Temp:   97.9 °F (36.6 °C)    TempSrc:   Oral    SpO2:  94% 96% 99%   Weight: 75 kg (165 lb 6.4 oz)      Height:                       Performance Status: 3  General: cachectic appearing male in no acute distress  Neuro: alert and oriented  HEENT: sclerae anicteric, oropharynx clear  Lymphatics: no cervical, supraclavicular, or axillary adenopathy  Cardiovascular: regular rate and rhythm, no murmurs  Lungs: decreased bilateral bases  Abdomen: soft, nontender, nondistended.  No palpable organomegaly  Extremities: no lower extremity edema  Skin: no rashes, lesions, bruising, or petechiae  Psych: mood and affect appropriate    Exam is unchanged    Labs:       WBC   Date Value Ref Range Status   03/11/2021 9.45 3.40 - 10.80 10*3/mm3 Final     RBC   Date Value Ref Range Status   03/11/2021 3.53 (L) 4.14 - 5.80 10*6/mm3 Final     Hemoglobin   Date Value Ref Range Status   03/11/2021 9.9 (L) 13.0 - 17.7 g/dL Final     Hematocrit   Date Value  Tess Kirkpatrick called me back and we discussed the information below. They had a good understanding and all questions were answered.    Rachael Soni MS, [facility] [place]  Genetic Counselor  [phone]     Ref Range Status   03/11/2021 30.8 (L) 37.5 - 51.0 % Final     MCV   Date Value Ref Range Status   03/11/2021 87.3 79.0 - 97.0 fL Final     MCH   Date Value Ref Range Status   03/11/2021 28.0 26.6 - 33.0 pg Final     MCHC   Date Value Ref Range Status   03/11/2021 32.1 31.5 - 35.7 g/dL Final     RDW   Date Value Ref Range Status   03/11/2021 13.3 12.3 - 15.4 % Final     RDW-SD   Date Value Ref Range Status   03/11/2021 42.3 37.0 - 54.0 fl Final     MPV   Date Value Ref Range Status   03/11/2021 9.9 6.0 - 12.0 fL Final     Platelets   Date Value Ref Range Status   03/11/2021 266 140 - 450 10*3/mm3 Final     Neutrophil %   Date Value Ref Range Status   03/11/2021 66.2 42.7 - 76.0 % Final     Lymphocyte %   Date Value Ref Range Status   03/11/2021 14.7 (L) 19.6 - 45.3 % Final     Monocyte %   Date Value Ref Range Status   03/11/2021 16.0 (H) 5.0 - 12.0 % Final     Eosinophil %   Date Value Ref Range Status   03/11/2021 2.2 0.3 - 6.2 % Final     Basophil %   Date Value Ref Range Status   03/11/2021 0.5 0.0 - 1.5 % Final     Immature Grans %   Date Value Ref Range Status   03/11/2021 0.4 0.0 - 0.5 % Final     Neutrophils, Absolute   Date Value Ref Range Status   03/11/2021 6.25 1.70 - 7.00 10*3/mm3 Final     Lymphocytes, Absolute   Date Value Ref Range Status   03/11/2021 1.39 0.70 - 3.10 10*3/mm3 Final     Monocytes, Absolute   Date Value Ref Range Status   03/11/2021 1.51 (H) 0.10 - 0.90 10*3/mm3 Final     Eosinophils, Absolute   Date Value Ref Range Status   03/11/2021 0.21 0.00 - 0.40 10*3/mm3 Final     Basophils, Absolute   Date Value Ref Range Status   03/11/2021 0.05 0.00 - 0.20 10*3/mm3 Final     Immature Grans, Absolute   Date Value Ref Range Status   03/11/2021 0.04 0.00 - 0.05 10*3/mm3 Final     nRBC   Date Value Ref Range Status   03/11/2021 0.0 0.0 - 0.2 /100 WBC Final       Glucose   Date Value Ref Range Status   03/11/2021 150 (H) 65 - 99 mg/dL Final     BUN   Date Value Ref Range Status   03/11/2021 13 8  - 23 mg/dL Final     Creatinine   Date Value Ref Range Status   03/11/2021 0.51 (L) 0.76 - 1.27 mg/dL Final     Sodium   Date Value Ref Range Status   03/11/2021 129 (L) 136 - 145 mmol/L Final     Potassium   Date Value Ref Range Status   03/11/2021 4.6 3.5 - 5.2 mmol/L Final     Chloride   Date Value Ref Range Status   03/11/2021 95 (L) 98 - 107 mmol/L Final     CO2   Date Value Ref Range Status   03/11/2021 27.0 22.0 - 29.0 mmol/L Final     Calcium   Date Value Ref Range Status   03/11/2021 8.4 (L) 8.6 - 10.5 mg/dL Final     Albumin   Date Value Ref Range Status   03/11/2021 2.50 (L) 3.50 - 5.20 g/dL Final     eGFR Non  Amer   Date Value Ref Range Status   03/11/2021 >150 >60 mL/min/1.73 Final     BUN/Creatinine Ratio   Date Value Ref Range Status   03/11/2021 25.5 (H) 7.0 - 25.0 Final     Anion Gap   Date Value Ref Range Status   03/11/2021 7.0 5.0 - 15.0 mmol/L Final       Lab Results   Component Value Date    CEA 0.75 03/05/2021               Assessment/Plan     Danny Alvarez Jr. is a 75 y.o. year old male  with metastatic malignancy with bilateral large effusion, possible omental disease, portal vein thrombosis and lower extremity DVT.     Metastatic adenocarcinoma: they are not ready to make a decision about treatment versus supportive care.  He wants to get home and see how he feels over the next few weeks before making a decision.    Recommend case management talk with wife about equipment needed for home.    Bilateral pleural effusions with hydropneumothorax:  pleurx on right.  Wife needs education re: drainage procedure.     Portal vein thrombosis/DVT: secondary to metastatic malignancy.  Will need indefinite anticoagulation.      DM: no current tx.    F/u with me in about 2 weeks.              Anupama Desouza MD  3/11/2021

## 2021-03-11 NOTE — THERAPY TREATMENT NOTE
Patient Name: Danny Alvarez Jr.  : 1945    MRN: [id number]                              Today's Date: 3/11/2021       Admit Date: 3/1/2021    Visit Dx:     ICD-10-CM ICD-9-CM   1. Pleural effusion  J90 511.9   2. Abnormal CT of the abdomen  R93.5 793.6   3. Dyspnea on exertion  R06.00 786.09   4. Elevated troponin  R77.8 790.6   5. 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 Right-sided pleural effusion  J90 511.9   7. Acute on chronic combined systolic and diastolic CHF (congestive heart failure) (CMS/HCC)  I50.43 428.43     428.0   8. Type 2 diabetes mellitus without complication, unspecified whether long term insulin use (CMS/HCC)  E11.9 250.00   9. 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)     Past Medical History:   Diagnosis Date   • Adenocarcinoma (CMS/HCC)    • Ascites    • CHF (congestive heart failure) (CMS/HCC)    • Diabetes mellitus (CMS/HCC)    • DVT (deep vein thrombosis) in pregnancy     Right   • Hypertension    • Hyponatremia    • Pleural effusion     Right   • 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) 3/11/2021     Past Surgical History:   Procedure Laterality Date   • CLAVICLE SURGERY     • PLEURAL CATHETER INSERTION Right 3/9/2021    Procedure: PLEURX CATHETER INSERTION WITH DRAINAGE OF PLEURAL EFFUSION LEFT CHEST TUBE INSERTION RIGHT;   Surgeon: Richy Reyes MD;  Location: [facility];  Service: Cardiothoracic;  Laterality: Right;  fluoro: 6 seconds  dose: 7 mGy     General Information     Row Name 03/11/21 1336          Physical Therapy Time and Intention    Document Type  therapy note (daily note)  (Pended)   -RN     Mode of Treatment  physical therapy;individual therapy  (Pended)   -RN     Row Name 03/11/21 1336          General Information    Patient Profile Reviewed  yes  (Pended)   -RN     Existing Precautions/Restrictions  fall;other (see comments)  (Pended)  dyspnea, R pleurx chest tube in place  -RN     Row Name 03/11/21 1336          Cognition    Orientation Status (Cognition)  oriented to;person;place;situation  (Pended)   -RN     Row Name 03/11/21 1336          Safety Issues, Functional Mobility    Safety Issues Affecting Function (Mobility)  safety precaution awareness;safety precautions follow-through/compliance;insight into deficits/self-awareness;judgment  (Pended)   -RN     Impairments Affecting Function (Mobility)  balance;endurance/activity tolerance;strength  (Pended)   -RN     Comment, Safety Issues/Impairments (Mobility)  pt declines assistive device use to steady balance during gait training.  (Pended)   -RN       User Key  (r) = Recorded By, (t) = Taken By, (c) = Cosigned By    Initials Name Provider Type    RN Kristin Humphrey, PT Student PT Student        Mobility     Row Name 03/11/21 1336          Bed Mobility    Comment (Bed Mobility)  pt in chair at beginning of PT session  (Pended)   -RN     Row Name 03/11/21 1336          Transfers    Comment (Transfers)  STS x2, first from chair and second from standard toilet chair with hand rails. VC for hand placement. Pt required min assist to correct LOB during stand-sit during toilet t/f. Pt has decreased eccentric muscular control.  (Pended)   -RN     Row Name 03/11/21 1336          Sit-Stand Transfer    Sit-Stand [place] (Transfers)  1 person assist;standby  assist  (Pended)   -RN     Assistive Device (Sit-Stand Transfers)  other (see comments)  (Pended)  no AD  -RN     Row Name 03/11/21 1336          Gait/Stairs (Locomotion)    [place] Level (Gait)  contact guard;1 person assist  (Pended)   -RN     Assistive Device (Gait)  other (see comments)  (Pended)  no AD  -RN     Distance in Feet (Gait)  280'  (Pended)   -RN     Deviations/Abnormal Patterns (Gait)  bilateral deviations;gait speed decreased;other (see comments)  (Pended)  scissoring gait  -RN     Bilateral Gait Deviations  heel strike decreased;forward flexed posture  (Pended)   -RN     [place] Level (Stairs)  minimum assist (75% patient effort);1 person assist  (Pended)   -RN     Handrail Location (Stairs)  right side (ascending);left side (descending);right side (descending)  (Pended)   -RN     Number of Steps (Stairs)  15 steps  (Pended)   -RN     Ascending Technique (Stairs)  step-to-step  (Pended)   -RN     Descending Technique (Stairs)  step-to-step  (Pended)   -RN     Stairs, Safety Issues  balance decreased during turns  (Pended)   -RN     Stairs, Impairments  strength decreased;impaired balance;coordination impaired  (Pended)   -RN     Comment (Gait/Stairs)  Pt required 3 standing rest breaks 30 sec-1 min each. Pt reports slight dizziness during gait and slightly looses balance with head turns during gait training. Pt is mildly unsteady during unsupported gait training.  (Pended)   -RN       User Key  (r) = Recorded By, (t) = Taken By, (c) = Cosigned By    Initials Name Provider Type    RN Kristin Humphrey, PT Student PT Student        Obj/Interventions     Row Name 03/11/21 1336          Balance    Balance Assessment  sitting static balance;sitting dynamic balance;standing static balance;standing dynamic balance  (Pended)   -RN     Static Sitting Balance  WFL;supported;sitting in chair  (Pended)   -RN     Dynamic Sitting Balance  WFL;supported;sitting in chair  (Pended)   -RN     Static Standing  Balance  unsupported;standing;mild impairment  (Pended)   -RN     Dynamic Standing Balance  mild impairment;unsupported;standing  (Pended)   -RN     Balance Interventions  standing;dynamic  (Pended)   -RN     Comment, Balance  Head turns during gait trial with increased unsteadiness noted. Pt was encouraged to use AD to increase stability with gait.  (Pended)   -RN       User Key  (r) = Recorded By, (t) = Taken By, (c) = Cosigned By    Initials Name Provider Type    RN Kristin Humphrey, PT Student PT Student        Goals/Plan    No documentation.       Clinical Impression     Row Name 03/11/21 5847          Pain    Additional Documentation  Pain Scale: Numbers Pre/Post-Treatment (Group)  (Pended)   -RN     Row Name 03/11/21 1420          Pain Scale: Numbers Pre/Post-Treatment    Pretreatment Pain Rating  0/10 - no pain  (Pended)   -RN     Posttreatment Pain Rating  0/10 - no pain  (Pended)   -RN     Row Name 03/11/21 8923          Plan of Care Review    Plan of Care Reviewed With  patient  (Pended)   -RN     Outcome Summary  Pt required SBA with STS t/f, CGA for gait, and min assist for stair navigation. Pt had a slight LOB with toilet t/f. Pt ambulated 280' with no AD and min-mod unsteadiness noted. Pt ascended/descended 15 steps with min assist for proper foot placement and balance correction. Pt encouraged to use AD during gait but declined.  Discussed rollator recommendation to pt and spouse with compliance to recommendation. D/C recommendation to IRF if compliant or home with assist and HH if non compliant to rehab.  (Pended)   -RN     Row Name 03/11/21 0889          Vital Signs    Pre Systolic BP Rehab  92  (Pended)   -RN     Pre Treatment Diastolic BP  59  (Pended)   -RN     Pretreatment Heart Rate (beats/min)  97  (Pended)   -RN     Intratreatment Resp Rate (breaths/min)  98  (Pended)   -RN     Post SpO2 (%)  91  (Pended)   -RN     O2 Delivery Post Treatment  room air  (Pended)   -RN     Pre Patient Position   Sitting  (Pended)   -RN     Intra Patient Position  Standing  (Pended)   -RN     Post Patient Position  Sitting  (Pended)   -RN     Row Name 03/11/21 1336          Positioning and Restraints    Pre-Treatment Position  sitting in chair/recliner  (Pended)   -RN     Post Treatment Position  chair  (Pended)   -RN     In Chair  reclined;call light within reach;encouraged to call for assist;with family/caregiver  (Pended)   -RN       User Key  (r) = Recorded By, (t) = Taken By, (c) = Cosigned By    Initials Name Provider Type    Kristin Woods, PT Student PT Student        Outcome Measures     Row Name 03/11/21 1336          How much help from another person do you currently need...    Turning from your back to your side while in flat bed without using bedrails?  4  (Pended)   -RN     Moving from lying on back to sitting on the side of a flat bed without bedrails?  4  (Pended)   -RN     Moving to and from a bed to a chair (including a wheelchair)?  3  (Pended)   -RN     Standing up from a chair using your arms (e.g., wheelchair, bedside chair)?  3  (Pended)   -RN     Climbing 3-5 steps with a railing?  3  (Pended)   -RN     To walk in hospital room?  3  (Pended)   -RN     AM-PAC 6 Clicks Score (PT)  20  (Pended)   -RN     Row Name 03/11/21 1336          Functional Assessment    Outcome Measure Options  AM-PAC 6 Clicks Basic Mobility (PT)  (Pended)   -RN       User Key  (r) = Recorded By, (t) = Taken By, (c) = Cosigned By    Initials Name Provider Type    Kristin Woods, PT Student PT Student        Physical Therapy Education                 Title: PT OT SLP Therapies (In Progress)     Topic: Physical Therapy (Done)     Point: Mobility training (Done)     Learning Progress Summary           Patient Acceptance, E,TB, VU,DU,NR by RN at 3/11/2021 1336    Comment: Pt instructed on PT POC, AD recommendations, and safety with gait and stair navigation.    Acceptance, E,TB, VU by  at 3/10/2021 1203    Comment: edu on POC  and HEP    Acceptance, E,D, VU,DU by LS at 3/9/2021 1200    Acceptance, E,TB, VU by  at 3/8/2021 1618    Comment: edu on PT services, POC, HEP, d/c plans   Significant Other Acceptance, E,TB, VU by  at 3/10/2021 1203    Comment: edu on POC and HEP                   Point: Home exercise program (Done)     Learning Progress Summary           Patient Acceptance, E,TB, VU,DU,NR by RN at 3/11/2021 1336    Comment: Pt instructed on PT POC, AD recommendations, and safety with gait and stair navigation.    Acceptance, E,TB, VU by  at 3/10/2021 1203    Comment: edu on POC and HEP    Acceptance, E,D, VU,DU by LS at 3/9/2021 1200    Acceptance, E,TB, VU by  at 3/8/2021 1618    Comment: edu on PT services, POC, HEP, d/c plans   Significant Other Acceptance, E,TB, VU by  at 3/10/2021 1203    Comment: edu on POC and HEP                   Point: Body mechanics (Done)     Learning Progress Summary           Patient Acceptance, E,TB, VU,DU,NR by RN at 3/11/2021 1336    Comment: Pt instructed on PT POC, AD recommendations, and safety with gait and stair navigation.    Acceptance, E,TB, VU by  at 3/10/2021 1203    Comment: edu on POC and HEP    Acceptance, E,D, VU,DU by LS at 3/9/2021 1200    Acceptance, E,TB, VU by  at 3/8/2021 1618    Comment: edu on PT services, POC, HEP, d/c plans   Significant Other Acceptance, E,TB, VU by  at 3/10/2021 1203    Comment: edu on POC and HEP                   Point: Precautions (Done)     Learning Progress Summary           Patient Acceptance, E,TB, VU,DU,NR by RN at 3/11/2021 1336    Comment: Pt instructed on PT POC, AD recommendations, and safety with gait and stair navigation.    Acceptance, E,TB, VU by  at 3/10/2021 1203    Comment: edu on POC and HEP    Acceptance, E,D, VU,DU by LS at 3/9/2021 1200    Acceptance, E,TB, VU by CHELY at 3/8/2021 1618    Comment: edu on PT services, POC, HEP, d/c plans   Significant Other Acceptance, E,TB, VU by CHELY at 3/10/2021 1203    Comment: edu  on POC and HEP                               User Key     Initials Effective Dates Name Provider Type Discipline    LS 06/19/15 -  Patti Vazquez, PT Physical Therapist PT    RN 07/21/20 -  Kristin Humphrey, PT Student PT Student PT    JH 09/22/20 -  Marco Garrett, PT Physical Therapist PT              PT Recommendation and Plan     Plan of Care Reviewed With: (P) patient  Outcome Summary: (P) Pt required SBA with STS t/f, CGA for gait, and min assist for stair navigation. Pt had a slight LOB with toilet t/f. Pt ambulated 280' with no AD and min-mod unsteadiness noted. Pt ascended/descended 15 steps with min assist for proper foot placement and balance correction. Pt encouraged to use AD during gait but declined.  Discussed rollator recommendation to pt and spouse with compliance to recommendation. D/C recommendation to IRF if compliant or home with assist and HH if non compliant to rehab.     Time Calculation:   PT Charges     Row Name 03/11/21 1336             Time Calculation    Start Time  1336  (Pended)   -RN      PT Received On  03/11/21  (Pended)   -RN      PT Goal Re-Cert Due Date  03/18/21  (Pended)   -RN         Timed Charges    68291 - Gait Training Minutes   15  (Pended)   -RN      93981 - PT Therapeutic Activity Minutes  21  (Pended)   -RN        User Key  (r) = Recorded By, (t) = Taken By, (c) = Cosigned By    Initials Name Provider Type    RN Kristin Humphrey, PT Student PT Student        Therapy Charges for Today     Code Description Service Date Service Provider Modifiers Qty    05653245276 HC GAIT TRAINING EA 15 MIN 3/11/2021 Kristin Humphrey, PT Student GP 1    06284437292 HC PT THERAPEUTIC ACT EA 15 MIN 3/11/2021 Kristin Humphrey, PT Student GP 1          PT G-Codes  Outcome Measure Options: (P) AM-PAC 6 Clicks Basic Mobility (PT)  AM-PAC 6 Clicks Score (PT): (P) 20  AM-PAC 6 Clicks Score (OT): 21    Kristin Humphrey PT Student  3/11/2021

## 2021-03-12 ENCOUNTER — APPOINTMENT (OUTPATIENT)
Dept: GENERAL RADIOLOGY | Facility: HOSPITAL | Age: 76
End: 2021-03-12

## 2021-03-12 VITALS
BODY MASS INDEX: 23.68 KG/M2 | OXYGEN SATURATION: 94 % | HEART RATE: 84 BPM | DIASTOLIC BLOOD PRESSURE: 64 MMHG | WEIGHT: 165.4 LBS | HEIGHT: 70 IN | SYSTOLIC BLOOD PRESSURE: 99 MMHG | TEMPERATURE: 97.5 F | RESPIRATION RATE: 16 BRPM

## 2021-03-12 LAB
GLUCOSE BLDC GLUCOMTR-MCNC: 160 MG/DL (ref 70–130)
GLUCOSE BLDC GLUCOMTR-MCNC: 208 MG/DL (ref 70–130)
GLUCOSE BLDC GLUCOMTR-MCNC: 503 MG/DL (ref 70–130)

## 2021-03-12 PROCEDURE — 99239 HOSP IP/OBS DSCHRG MGMT >30: CPT | Performed by: NURSE PRACTITIONER

## 2021-03-12 PROCEDURE — 99232 SBSQ HOSP IP/OBS MODERATE 35: CPT | Performed by: INTERNAL MEDICINE

## 2021-03-12 PROCEDURE — 82962 GLUCOSE BLOOD TEST: CPT

## 2021-03-12 PROCEDURE — 99231 SBSQ HOSP IP/OBS SF/LOW 25: CPT | Performed by: THORACIC SURGERY (CARDIOTHORACIC VASCULAR SURGERY)

## 2021-03-12 PROCEDURE — 71045 X-RAY EXAM CHEST 1 VIEW: CPT

## 2021-03-12 PROCEDURE — 63710000001 DRONABINOL PER 2.5 MG: Performed by: INTERNAL MEDICINE

## 2021-03-12 RX ORDER — MIDODRINE HYDROCHLORIDE 10 MG/1
10 TABLET ORAL EVERY 8 HOURS SCHEDULED
Qty: 90 TABLET | Refills: 0 | Status: SHIPPED | OUTPATIENT
Start: 2021-03-12 | End: 2021-04-07

## 2021-03-12 RX ORDER — ONDANSETRON 4 MG/1
4 TABLET, FILM COATED ORAL EVERY 6 HOURS PRN
Qty: 12 TABLET | Refills: 0 | Status: SHIPPED | OUTPATIENT
Start: 2021-03-12

## 2021-03-12 RX ADMIN — APIXABAN 10 MG: 5 TABLET, FILM COATED ORAL at 09:37

## 2021-03-12 RX ADMIN — DRONABINOL 5 MG: 2.5 CAPSULE ORAL at 07:32

## 2021-03-12 RX ADMIN — MIDODRINE HYDROCHLORIDE 10 MG: 10 TABLET ORAL at 15:35

## 2021-03-12 RX ADMIN — MIDODRINE HYDROCHLORIDE 10 MG: 10 TABLET ORAL at 07:32

## 2021-03-12 RX ADMIN — LINAGLIPTIN 5 MG: 5 TABLET, FILM COATED ORAL at 09:37

## 2021-03-12 RX ADMIN — DOCUSATE SODIUM 50MG AND SENNOSIDES 8.6MG 2 TABLET: 8.6; 5 TABLET, FILM COATED ORAL at 09:37

## 2021-03-12 NOTE — PLAN OF CARE
Goal Outcome Evaluation:  Plan of Care Reviewed With: patient  Progress: improving  Outcome Summary: VSS, denies pain or discomfort, pt rested well most of the night, RA, no new concerns, will continue to monitor.      Problem: Airway Clearance Ineffective  Goal: Effective Airway Clearance  Intervention: Promote Airway Secretion Clearance  Recent Flowsheet Documentation  Taken 3/12/2021 0400 by Camila Angulo RN  Activity Management: activity adjusted per tolerance  Taken 3/12/2021 0000 by Camila Angulo RN  Activity Management: activity adjusted per tolerance  Taken 3/11/2021 2000 by Camila Angulo RN  Activity Management: up in chair       Problem: Adult Inpatient Plan of Care  Goal: Plan of Care Review  Flowsheets (Taken 3/12/2021 0458)  Progress: improving  Plan of Care Reviewed With: patient  Outcome Summary: VSS, denies pain or discomfort, pt rested well most of the night, RA, no new concerns, will continue to monitor.     Problem: Adult Inpatient Plan of Care  Goal: Absence of Hospital-Acquired Illness or Injury  Intervention: Identify and Manage Fall Risk  Recent Flowsheet Documentation  Taken 3/12/2021 0400 by Camila Angulo RN  Safety Promotion/Fall Prevention: activity supervised  Taken 3/11/2021 2000 by Camila Angulo RN  Safety Promotion/Fall Prevention: activity supervised

## 2021-03-12 NOTE — DISCHARGE SUMMARY
[facility] Hospital Medicine Services  DISCHARGE SUMMARY    Patient Name: Danny Alvarez Jr.  : 1945  MRN: [id number]    Date of Admission: 3/1/2021 10:39 AM  Date of Discharge:  3/12/21  Primary Care Physician: Jef Beckham MD    Consults     Date and Time Order Name Status Description    3/8/2021  9:09 AM Inpatient Cardiothoracic Surgery Consult Completed     3/5/2021 10:12 AM Inpatient Hematology & Oncology Consult Completed     3/3/2021 10:17 AM Inpatient Pulmonology Consult Completed     3/2/2021 12:32 AM Inpatient Gastroenterology Consult Completed           Hospital Course     Presenting Problem:   Pleural effusion on left [J90]  Pleural effusion on left [J90]    Active Hospital Problems    Diagnosis  POA   • **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 [J90]  Yes   • 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          Hospital Course:  Danny Alvarez Jr. is a 75 y.o. male with a past medical history for hypertension, diabetes mellitus, systolic heart failure (EF 41%), and portal vein thrombosis presented to [facility] on 3/1/2021 for dyspnea.  CTA the chest with large bilateral effusions, CT abdomen pelvis revealed a portal vein thrombosis with concerns for omental metastasis, ECHO showing EF of 41% and diastolic dysfunction.       Patient underwent initial IR left thoracentesis of 2L on 3/1, then  underwent a IR placed left pigtail catheter placement 3/3/2021 for rapid re-accumulation with pleural fluid cytology consistent with pancreaticobiliary primary adenocarcinoma and CA 19-9 markedly elevated.  3/4/2021 lower extremity Doppler showed a DVT of the peroneal on the right.       CT of the chest from 3/7/2021 showed mod-large right effusion and the left-sided effusion loculated with continued trap left lung and a hydropneumothorax, therefore CTS (Dr. Reyes) operatively placed right Pleurex and left chest tube on 3/9/21.        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 Desouza of Oncology follows, likely plan for close office f/u at d/c; will see Dr Desouza in 2 weeks      Portal vein thrombosis  RLE DVT  Hypercoagulability of malignancy  - stop hep gtt and transition to PO Eliquis  - indefinite anticoagulation unless absolute contraindication arises in future     Hyponatremia  - wax/wanes related to fluid shifts  - overall fairly stable and asymptomatic      Hx of HTN but currently with chronic hypotension (fluid shifts etc)  - continue midodrine  - euvolemic, normal BUN:creat ratio     DM2  --will continue Tradjenta at discharge and monitor closely with PCP follow up, due to Metformin interacting with Midodrine      Will be discharged home today        Discharge Follow Up Recommendations for outpatient labs/diagnostics:  PCP 1 week   Dr Desouza 2 weeks     Day of Discharge     HPI:   Up in chair, NAD. No family in room.  Patient feels well today, states no pain. Eating well, had BM this morning and urinating well. Denies any blood in urine or stools. Only feels SOA when lying flat in bed. Eager to go home today. Denies f/c, n/v/d, palpitations, or cp.      Review of Systems  All other systems negative     Vital Signs:    Temp:  [97.7 °F (36.5 °C)-98.3 °F (36.8 °C)] 98 °F (36.7 °C)  Heart Rate:  [] 100  Resp:  [16-18] 16  BP: ()/(58-73) 98/69     Physical Exam:  Constitutional: No acute distress, awake, alert  HENT: NCAT, mucous membranes moist  Respiratory: Clear to auscultation bilaterally, respiratory effort normal   Cardiovascular: RRR, no murmurs, rubs, or gallops  Gastrointestinal: Positive bowel sounds, soft, nontender, nondistended  Musculoskeletal: No bilateral ankle edema  Psychiatric: Appropriate affect, cooperative  Neurologic: Oriented x 3, strength symmetric in all extremities, Cranial Nerves grossly intact to confrontation, speech clear  Skin: No rashes     Pertinent  and/or Most Recent Results     LAB RESULTS:      Lab 03/11/21  0541 03/10/21  2037 03/10/21  1345 03/10/21  0355 03/09/21  1138 03/09/21  0407 03/08/21  0349 03/07/21  0310   WBC 9.45  --   --  9.62  --  7.19 9.79 15.42*   HEMOGLOBIN 9.9*  --   --  9.7*  --  9.8* 9.6* 9.7*  9.7*   HEMATOCRIT 30.8*  --   --  31.4*  --  31.0* 29.8* 30.5*  30.5*   PLATELETS 266  --   --  250  --  230 201 230   NEUTROS ABS 6.25  --   --  7.45*  --  4.91 7.38* 11.90*   IMMATURE GRANS (ABS) 0.04  --   --  0.04  --  0.04 0.04 0.07*   LYMPHS ABS 1.39  --   --  0.75  --  0.93 1.00 1.65   MONOS ABS 1.51*  --   --  1.31*  --  1.08* 1.29* 1.60*   EOS ABS 0.21  --   --  0.04  --  0.18 0.05 0.16   MCV 87.3  --   --  89.7  --  89.9 89.2 88.9   HEPARIN ANTI-XA 0.40 0.49 0.11*  --  0.21* 0.25* 0.34  --          Lab 03/11/21  0541 03/10/21  0355 03/09/21  0407 03/08/21  0349 03/07/21  0310   SODIUM 129* 133* 131* 129* 131*   POTASSIUM 4.6 4.8 4.7 4.7 4.7   CHLORIDE 95* 98 96* 94* 95*   CO2 27.0 31.0* 30.0* 30.0* 31.0*   ANION GAP 7.0 4.0* 5.0 5.0 5.0   BUN 13 13 9 11 9   CREATININE 0.51* 0.62* 0.55* 0.57* 0.53*   GLUCOSE 150* 160* 152* 181* 207*   CALCIUM 8.4* 8.6 8.5* 8.5* 8.6   MAGNESIUM 1.9 1.8 1.9 1.7 2.0   PHOSPHORUS 3.6 4.8* 3.5 2.7 2.6         Lab 03/11/21  0541  03/10/21  0355 03/09/21  0407 03/08/21  0349 03/07/21  0310   ALBUMIN 2.50* 2.80* 2.60* 2.80* 2.90*                 Lab 03/08/21  1118   ABO TYPING O   RH TYPING Positive   ANTIBODY SCREEN Negative         Brief Urine Lab Results     None        Microbiology Results (last 10 days)     ** No results found for the last 240 hours. **          Adult Transthoracic Echo Complete W/ Cont if Necessary Per Protocol    Result Date: 3/4/2021  · Left ventricular ejection fraction appears to be 41 - 45%. Left ventricular systolic function is moderately decreased. · Left ventricular diastolic function is consistent with (grade II w/high LAP) pseudonormalization. · The left ventricular cavity is mildly dilated. · Left ventricular wall thickness is consistent with mild concentric hypertrophy. · Moderate tricuspid valve regurgitation is present. · Estimated right ventricular systolic pressure from tricuspid regurgitation is markedly elevated (>55 mmHg). · Moderate mitral valve regurgitation is present. · There is a left pleural effusion. · There is a small (<1cm) pericardial effusion.      CT Chest Without Contrast Diagnostic    Result Date: 3/8/2021  EXAMINATION: CT CHEST WO CONTRAST DIAGNOSTIC- 03/07/2021  INDICATION: Hydropneumothorax; R35-Hfvbjri effusion, not elsewhere classified; R93.5-Abnormal findings on diagnostic imaging of other abdominal regions, including retroperitoneum; R06.00-Dyspnea, unspecified; R77.8-Other specified abnormalities of plasma proteins; shortness of breath, chest pain  TECHNIQUE: Multiple axial CT imaging was obtained of the chest without the administration of intravenous contrast.  The radiation dose reduction device was turned on for each scan per the ALARA (As Low as Reasonably Achievable) protocol.  COMPARISON: 03/03/2021  FINDINGS: Small hydropneumothorax is identified on the left with left chest tube in place. There is layering increased density suggesting hemorrhage within the fluid collection.  There is dense consolidation identified anteriorly within the left upper lobe as well as dense consolidation seen of the left lower lobe concerning for infiltrates. There is improvement seen in the aeration of some of the left lung in the interval. There is large bilateral pleural effusions right greater than left. Consolidation as well seen within the right lower lobe concerning for infiltrate. The cardiac chambers within normal limits. Coronary artery calcification identified. Vascular calcifications seen within the thoracic aorta. No pericardial effusion. The bony structures reveal degenerative changes seen within the spine. The visualized upper abdomen reveals fluid seen surrounding the liver and spleen.      Small hydropneumothorax on the left with consolidation seen anteriorly within the left upper lobe as well as of the left lung base. There is bilateral pleural effusions moderate to large in size right greater than left with hydropneumothorax seen on the left and layering increased density in the fluid collection to suggest hemorrhage. The hemorrhage is stable. The aeration within the left lung is somewhat improving.  D:  03/08/2021 E:  03/08/2021  This report was finalized on 3/8/2021 3:59 PM by Dr. Julianne Brady MD.      CT Angiogram Chest With & Without Contrast    Result Date: 3/3/2021  EXAMINATION: CT ANGIOGRAM CHEST W WO CONTRAST-  INDICATION: Chest pain, nonspecific; K22-Uxcmlmg effusion, not elsewhere classified; R93.5-Abnormal findings on diagnostic imaging of other abdominal regions, including retroperitoneum; R06.00-Dyspnea, unspecified; R77.8-Other specified abnormalities of plasma proteins  TECHNIQUE: Axial IV contrast-enhanced CT of the chest including arterial phase per CT angiogram protocol, with delayed phase imaging and three-dimensional reconstructions.  The radiation dose reduction device was turned on for each scan per the ALARA (As Low as Reasonably Achievable) protocol.   COMPARISON: 3/1/2021  FINDINGS: No pathologic axillary adenopathy or other worrisome body wall soft tissue finding in the chest. Evaluation of the abdomen and pelvis redemonstrates small-to-moderate volume ascites. Normal caliber atherosclerotic thoracic aorta. No evidence of acute fracture or aggressive osseous lesion. There are redemonstrated large left and moderate to large right pleural effusions, with near complete volume loss of the left lung and significant right lower lobe volume loss. Aerated portions of the right lung demonstrates some unchanged areas of minimal nonspecific groundglass opacity. There is some layering somewhat linear hyperdensity within the dependent portions of the large left effusion, possible layering blood products. These findings are unchanged with contrast administration there is no evidence of arterial extravasation or increased enhancement on delayed phase imaging to suggest active hemorrhage. A left-sided smallbore chest tube is noted in place.      Redemonstrated very large left and moderate to large right pleural effusions with significant associated volume loss. Unchanged appearance of aerated portions of the right lung with some minimal groundglass opacity. There are layering linear areas of hyperdensity within the large left pleural effusion possibly related to dependent blood products, however there is no definite arterial extravasation or contrast pooling on delayed phase imaging to suggest active hemorrhage.   This report was finalized on 3/3/2021 4:46 PM by Asif Fletcher.      CT Abdomen Pelvis With Contrast    Result Date: 3/1/2021  EXAMINATION: CT ANGIOGRAM CHEST-, CT ABDOMEN/PELVIS W CONTRAST-03/01/2021:  INDICATION: Severe dyspnea on exertion; large pleural effusion on CXR.  TECHNIQUE: Spiral acquisition 3 mm post-IV contrast images through the chest with sagittal and coronal 2-D reconstructions, and 5 mm post-IV contrast portal venous phase and delayed venous phase  images through the abdomen and pelvis.  The radiation dose reduction device was turned on for each scan per the ALARA (As Low as Reasonably Achievable) protocol.  COMPARISON: Portable chest radiograph of same date.  FINDINGS:  CT ANGIOGRAM CHEST:  There is a very large left pleural effusion occupying most of the left hemithorax, with only a small amount of remaining aerated left lung. There is also a large right pleural effusion larger than it appears on plain film. Mediastinal window images show no evidence of mediastinal mass or adenopathy. There is good contrast opacification of the pulmonary arteries and no evidence of pulmonary embolic disease. The remaining non-atelectatic portions of the lungs appear clear except for mild nonspecific interstitial changes.      1. No evidence of pulmonary embolus. 2. Very large left pleural effusion, and atelectasis of most of the left lung. 3. Large right pleural effusion with atelectasis of a moderate amount of right lower lobe. Minor nonspecific interstitial lung changes seen elsewhere.  ABDOMEN AND PELVIS CT SCAN WITH IV CONTRAST: There is mild-to- moderate ascites, adjacent the liver and spleen and in the upper paracolic gutters. There is a very small amount of intrapelvic free fluid. There is diffuse fatty liver change, and somewhat asymmetric enhancement pattern of the right and left liver lobes initially, but uniform and normal in appearance of the liver on the delayed series. As can be appreciated on images 25-30, there is dense opacification of the main portal vein, right portal vein, splenic vein with contrast, but no visible portal vein contrast in the left portal vein, suggesting thrombosis and occlusion. There is perhaps mild narrowing of the distal main portal vein at the confluence of the right and left portal veins, axial image 29-series 8. Superior mesenteric vein is incompletely contrast opacified but this may be due to venous mixing, rather than thrombus.   The spleen is in the upper range of normal at 12.5 cm. There is upper abdomen fat stranding although this may represent edema. The appearance somewhat resembles omental metastatic disease, particularly in the right mid abdomen, images 46-49 of series 8. The bowel loops are normal in caliber. There is a large simple appearing right renal cyst. The kidneys otherwise appear unremarkable. The pancreas, gallbladder, and adrenal glands appear unremarkable.  Regarding the lower abdomen and pelvis, no mass or adenopathy is seen. There is a relatively small amount of pelvic ascites. Delayed venous phase images show contrast opacification of normal caliber renal collecting systems, ureters and bladder. The bony structures appear grossly intact.  IMPRESSION: 1. Mild-to-moderate ascites. 2. Apparently thrombosed left portal vein, with no visible portal vein contrast in the left lobe, and delayed parenchymal enhancement as a result. 3. Rather dense reticular disease of the right upper quadrant omental fat, possibly related to the patient's ascites but worrisome for omental metastatic disease. Similar milder changes of the omental fat elsewhere.  NOTE: Preliminary findings of this exam were discussed with the ordering provider, MARK Travis, at 2:20 PM 03/01/2021.  D:  03/01/2021 E:  03/01/2021  This report was finalized on 3/1/2021 10:03 PM by Dr. Michael Dennis MD.      XR Chest 1 View    Result Date: 3/12/2021  EXAMINATION: XR CHEST 1 VW-  INDICATION: Left effusion, left SBCT; A91-Lrnvynv effusion, not elsewhere classified; R93.5-Abnormal findings on diagnostic imaging of other abdominal regions, including retroperitoneum; R06.00-Dyspnea, unspecified; R77.8-Other specified abnormalities of plasma proteins; 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  COMPARISON: 03/11/2021.  FINDINGS: Right pleural drain is seen in place. Left thoracotomy tube has been removed. There is a persistent left basilar and left apical pneumothorax,  not significantly changed from yesterday's exam. No right pneumothorax is seen. Left basilar atelectasis appears stable. Mild perihilar disease appear stable.      Left pleural drain removal with stable left pneumothorax and stable left basilar atelectasis.   D:  03/12/2021 E:  03/12/2021      XR Chest 1 View    Result Date: 3/11/2021  EXAMINATION: XR CHEST 1 VW-  INDICATION: Left effusion, left SBCT; T97-Cnfmpzq effusion, not elsewhere classified; R93.5-Abnormal findings on diagnostic imaging of other abdominal regions, including retroperitoneum; R06.00-Dyspnea, unspecified; R77.8-Other specified abnormalities of plasma proteins.  COMPARISON: Chest x-ray 03/10/2021.  FINDINGS: Support hardware unchanged with persistent lucency and surrounding opacifications at the left lung base unchanged from prior with a left at least moderate volume pneumothorax similar in appearance.         Similar appearance of the left moderate volume pneumothorax with bibasilar predominance and surrounding opacifications similar to prior comparison.  D:  03/11/2021 E:  03/11/2021  This report was finalized on 3/11/2021 6:56 PM by Dr. Arturo Melendez.      XR Chest 1 View    Result Date: 3/10/2021  EXAMINATION: XR CHEST 1 VW-  INDICATION: Left effusion, L SBCT; W35-Ffmiinw effusion, not elsewhere classified; R93.5-Abnormal findings on diagnostic imaging of other abdominal regions, including retroperitoneum; R06.00-Dyspnea, unspecified; R77.8-Other specified abnormalities of plasma proteins  COMPARISON: One day prior  FINDINGS: Bilateral pleural drains remain in place. Trace persistent right effusion. No significant effusion on the left with persistent moderate pneumothorax. Unchanged heart and mediastinal contours.      No significant interval change.  This report was finalized on 3/10/2021 8:34 AM by Asif Fletcher.      XR Chest 1 View    Result Date: 3/9/2021  CR Chest 1 Vw INDICATION: Pneumothorax, pleural effusion COMPARISON:  Chest  x-ray from 3/19/2021 FINDINGS: Single portable AP view(s) of the chest. Heart borders are obscured. There may be slight interval improvement in aeration in the right lung field. Right pleural fluid is again noted. Again noted is a left-sided hydropneumothorax but does not appear definitely changed from prior, although it is not well visualized.     Findings may have mildly improved on the right. There is continued left-sided hydropneumothorax that does not appear significantly changed. Signer Name: Patti Puente MD  Signed: 3/9/2021 7:17 PM  Workstation Name: [id number]  [facility]    XR Chest 1 View    Result Date: 3/9/2021  EXAMINATION: XR CHEST 1 VW- 03/09/2021  INDICATION: Left effusion, L SBCT; W68-Sxtklpl effusion, not elsewhere classified; R93.5-Abnormal findings on diagnostic imaging of other abdominal regions, including retroperitoneum; R06.00-Dyspnea, unspecified; R77.8-Other specified abnormalities of plasma proteins  COMPARISON: Chest x-ray 03/08/2021  FINDINGS: Left small bore chest tube in place with persistent layering opacifications and effusion left hemithorax along with a left apical predominant stable to slightly increased small to moderate volume left pneumothorax. Right pleural effusion and opacification similar.        Similar appearance of left layering pleural effusion with a left apical predominant pneumothorax demonstrating potential mild increase in size from prior comparison.  D:  03/09/2021 E:  03/09/2021   This report was finalized on 3/9/2021 4:53 PM by Dr. Arturo Melendez.      XR Chest 1 View    Result Date: 3/8/2021  EXAMINATION: XR CHEST 1 VW- 03/08/2021  INDICATION: Left effusion, L SBCT; A30-Vidityh effusion, not elsewhere classified; R93.5-Abnormal findings on diagnostic imaging of other abdominal regions, including retroperitoneum; R06.00-Dyspnea, unspecified; R77.8-Other specified abnormalities of plasma proteins  COMPARISON: 03/07/2021  FINDINGS: Portable  chest reveals heart to be borderline enlarged. There is no change seen in the pneumothorax on the left. Left chest tube remains in place. There is a moderate size pleural effusion on the right. Improvement seen in the pulmonary vascularity in the interval.        Improvement seen in the pulmonary vascularity with stable left pneumothorax. No change in the moderate size right pleural effusion.  D:  03/08/2021 E:  03/08/2021  This report was finalized on 3/8/2021 10:40 AM by Dr. Julianne Brady MD.      XR Chest 1 View    Result Date: 3/8/2021   EXAMINATION: XR CHEST 1 VW - 03/07/2021  INDICATION: K29-Gtgaalm effusion, not elsewhere classified; R93.5-Abnormal findings on diagnostic imaging of other abdominal regions, including retroperitoneum; R06.00-Dyspnea, unspecified; R77.8-Other specified abnormalities of plasma proteins. Left effusion.  COMPARISON: 03/06/2021  FINDINGS: Portable chest reveals heart to be enlarged. Bilateral pleural effusions with increased pulmonary vascularity bilaterally. The pneumothorax on the left is unchanged. The left chest tube remains in place. Degenerative changes seen within the spine.        Stable chest as above.  DICTATED:   03/07/2021 EDITED/ls :   03/07/2021  This report was finalized on 3/8/2021 10:34 AM by Dr. Julianne Brady MD.      XR Chest 1 View    Result Date: 3/8/2021   EXAMINATION: XR CHEST 1 VW - 03/06/2021  INDICATION: G99-Gwojvih effusion, not elsewhere classified; R93.5-Abnormal findings on diagnostic imaging of other abdominal regions, including retroperitoneum; R06.00-Dyspnea, unspecified; R77.8-Other specified abnormalities of plasma proteins. Left effusion.  COMPARISON: 03/05/2021  FINDINGS: Portable chest reveals heart to be borderline enlarged. There is a chest tube identified on the left. The appearance of the the lung fields is stable. There is a small right pleural effusion remaining. The basilar pneumothorax on the left is stable. Tiny apical  component remains. Degenerative changes seen within the spine with increased markings seen in the perihilar regions bilaterally with slight improvement on the left.        Slight improvement seen in the ill-defined opacification in the left perihilar region. The basilar and apical pneumothorax on the left is stable with left chest tube in place. No change in the right pleural effusion.  DICTATED:   03/06/2021 EDITED/ls :   03/06/2021  This report was finalized on 3/8/2021 9:52 AM by Dr. Julianne Brady MD.      XR Chest 1 View    Result Date: 3/5/2021  EXAMINATION: XR CHEST 1 VW- 03/05/2021  INDICATION: Left effusion, L SBCT; B24-Bmcunej effusion, not elsewhere classified; R93.5-Abnormal findings on diagnostic imaging of other abdominal regions, including retroperitoneum; R06.00-Dyspnea, unspecified; R77.8-Other specified abnormalities of plasma proteins  COMPARISON: 03/04/2021  FINDINGS: Small bore left pleural drain remains in place. There is persistent left basilar reexpansion pneumothorax which appears a little improved. Masslike opacity of the left midlung may represent the partially atelectatic lower lung. No new left-sided pulmonary parenchymal disease is seen.  On the right, there is persistent patchy opacity of the right perihilar region and right base unchanged. Right upper lung remains clear. There is probably a small residual right pleural effusion.      Little overall change in appearance of the lungs, with persistent perihilar disease and very small right pleural effusion. Left basilar pneumothorax appears a little improved.   D:  03/05/2021 E:  03/05/2021  This report was finalized on 3/5/2021 11:58 PM by Dr. Michael Dennis MD.      XR Chest 1 View    Result Date: 3/4/2021  EXAMINATION: XR CHEST 1 VW-  INDICATION: Left effusion, L SBCT; G65-Yibzcri effusion, not elsewhere classified; R93.5-Abnormal findings on diagnostic imaging of other abdominal regions, including retroperitoneum; R06.00-Dyspnea,  unspecified; R77.8-Other specified abnormalities of plasma proteins  COMPARISON: One day prior  FINDINGS: Interval placement of a smallbore left-sided chest tube with significantly improved left-sided pleural effusion, now small to moderate. Persistent small right pleural effusion. No distinct pneumothorax. Perihilar opacities are present, likely edema related. No focal lobar consolidation otherwise.      Interval placement of a smallbore left-sided chest tube with significantly improved left-sided pleural effusion, now small to moderate. Persistent small right pleural effusion. No distinct pneumothorax. Perihilar opacities are present, likely edema related. No focal lobar consolidation otherwise.  This report was finalized on 3/4/2021 8:40 AM by Asif Fletcher.      XR Chest 1 View    Result Date: 3/3/2021  EXAMINATION: XR CHEST 1 VW-  INDICATION: effusions; A44-[place] effusion, not elsewhere classified; R93.5-Abnormal findings on diagnostic imaging of other abdominal regions, including retroperitoneum; R06.00-Dyspnea, unspecified; R77.8-Other specified abnormalities of plasma proteins  COMPARISON: One day prior  FINDINGS: Marked short interval increase in now large left pleural effusion. Persistent small right pleural effusion unchanged. There is associated left lung volume loss. No distinct pneumothorax. Heart and mediastinal contours are largely obscured.      Short interval significant increase in now large left-sided pleural effusion.  This report was finalized on 3/3/2021 10:23 AM by Asif Fletcher.      XR Chest 1 View    Result Date: 3/1/2021  EXAMINATION: XR CHEST 1 VW-  INDICATION: Chest pain triage protocol.  COMPARISON: None.  FINDINGS: There is opacification of most of the left hemithorax by pleural effusion. There is moderately extensive right basilar atelectasis plus/minus effusion. The heart is enlarged. The vasculature appears upper normal size.      1. Opacification of most of the left  hemithorax, apparently by pleural effusion. 2. Moderately extensive right basilar atelectasis and effusion. 3. Cardiomegaly without evidence of overt congestive failure.   D:  03/01/2021 E:  03/01/2021  This report was finalized on 3/1/2021 9:42 PM by Dr. Michael Dennis MD.      CT Guided Chest Tube    Result Date: 3/3/2021  PROCEDURE: Chest tube placement  Procedural Personnel Attending physician(s): YANI Graff M.D. Fellow physician(s): None Resident physician(s): None Advanced practice provider(s): None  Pre-procedure diagnosis: ER with SOA. Left >right pleural effusion - s/p left thoracentesis?03/01/2021, 2 L dark yellow/cloudy output c/w exudate per Light's criteria. Quick re-accumulation of left side pleural fluid collection. Fatty liver with trace of?ascites; Portal vein thrombosis, on anticoagulation with UFH Weight loss 50 lb / ? time Post-procedure diagnosis: Same Indication: Diagnostic/therapeutic Additional clinical history: None  Complications: No immediate complications.       Left-sided 8.5 Syriac chest tube placement, yielding 60 mL of bloody fluid. Portion of fluid sent for requested laboratory analysis Given the change in quality of the fluid from the thoracentesis 03/01/21 from dark/yellow/cloudy to bloody on today's chest tube placement, decision was made to perform CTA of the chest. Dr. Vazquez notified of the change in quality of the fluid and the plan for CTA.. There is evidence related to a previous bleed into the fluid (linear areas of increased density without evidence of active arterial contrast extravasation or increased enhancement on delayed phase imaging to suggest active hemorrhage. Previous bleed may be related to anticoagulation [Ref: CTA chest 3/3/21 at 15:27].  Plan:  Chest tube management by primary team/pulmonary Continue to monitor hemoglobin/hematocrit ______________________________________________________________________  PROCEDURE SUMMARY: - Percutaneous pleural drainage with  insertion of indwelling catheter under CT guidance - Additional procedure(s): None  PROCEDURE DETAILS:  Pre-procedure Consent: Informed consent for the procedure including risks, benefits and alternatives was obtained and time-out was performed prior to the procedure. Preparation: The site was prepared and draped using maximal sterile barrier technique including cutaneous antisepsis.  Anesthesia/sedation Level of anesthesia/sedation: None  Chest tube placement The patient was positioned RPO. Initial imaging was performed. Local anesthesia was administered. The pleural space was accessed using trocar technique and a drainage catheter was placed. Position of the drainage catheter within the pleural space was confirmed. - Initial imaging findings: Large left-sided pleural fluid collection - Drainage catheter placed: 8.5 [place] nonlocking pigtail - External catheter securement:  0 silk suture and stay fix bandage - Post-drainage imaging findings: Catheter in good position - Additional findings: Change and fluid quality from cloudy dark yellow to bloody compared to prior thoracentesis Given the change in quality of the fluid from the thoracentesis 03/01/21 from dark/yellow/cloudy to bloody on today's chest tube placement, decision was made to perform CTA of the chest. Dr. Vazquez notified of the change in quality of the fluid and the plan for CTA.. There is evidence related to a previous bleed into the fluid (linear areas of increased density without evidence of active arterial contrast extravasation or increased enhancement on delayed phase imaging to suggest active hemorrhage. Previous bleed may be related to anticoagulation [Ref: CTA chest 3/3/21 at 15:27].  Contrast Contrast agent: None Contrast volume (mL): 0  Radiation Dose CT dose length product (mGy-cm): 490  Additional Details Additional description of procedure: None Equipment details: None Specimens removed: Aspirated fluid sent for analysis. Estimated blood  loss (mL): Less than 10 Standardized report: ChestTube  Attestation I was present and scrubbed for the entire procedure. Imaging reviewed. Agree with final report as written.  This report was finalized on 3/3/2021 6:49 PM by Raheem Graff.      Duplex Venous Lower Extremity - Bilateral CAR    Result Date: 3/4/2021  · Acute right lower extremity deep vein thrombosis noted in the peroneal. · Acute right lower extremity superficial thrombophlebitis noted in the small saphenous. · Acute left lower extremity superficial thrombophlebitis noted in the small saphenous. · All other veins appeared normal bilaterally.      CT Angiogram Chest    Result Date: 3/1/2021  EXAMINATION: CT ANGIOGRAM CHEST-, CT ABDOMEN/PELVIS W CONTRAST-03/01/2021:  INDICATION: Severe dyspnea on exertion; large pleural effusion on CXR.  TECHNIQUE: Spiral acquisition 3 mm post-IV contrast images through the chest with sagittal and coronal 2-D reconstructions, and 5 mm post-IV contrast portal venous phase and delayed venous phase images through the abdomen and pelvis.  The radiation dose reduction device was turned on for each scan per the ALARA (As Low as Reasonably Achievable) protocol.  COMPARISON: Portable chest radiograph of same date.  FINDINGS:  CT ANGIOGRAM CHEST:  There is a very large left pleural effusion occupying most of the left hemithorax, with only a small amount of remaining aerated left lung. There is also a large right pleural effusion larger than it appears on plain film. Mediastinal window images show no evidence of mediastinal mass or adenopathy. There is good contrast opacification of the pulmonary arteries and no evidence of pulmonary embolic disease. The remaining non-atelectatic portions of the lungs appear clear except for mild nonspecific interstitial changes.      1. No evidence of pulmonary embolus. 2. Very large left pleural effusion, and atelectasis of most of the left lung. 3. Large right pleural effusion with  atelectasis of a moderate amount of right lower lobe. Minor nonspecific interstitial lung changes seen elsewhere.  ABDOMEN AND PELVIS CT SCAN WITH IV CONTRAST: There is mild-to- moderate ascites, adjacent the liver and spleen and in the upper paracolic gutters. There is a very small amount of intrapelvic free fluid. There is diffuse fatty liver change, and somewhat asymmetric enhancement pattern of the right and left liver lobes initially, but uniform and normal in appearance of the liver on the delayed series. As can be appreciated on images 25-30, there is dense opacification of the main portal vein, right portal vein, splenic vein with contrast, but no visible portal vein contrast in the left portal vein, suggesting thrombosis and occlusion. There is perhaps mild narrowing of the distal main portal vein at the confluence of the right and left portal veins, axial image 29-series 8. Superior mesenteric vein is incompletely contrast opacified but this may be due to venous mixing, rather than thrombus.  The spleen is in the upper range of normal at 12.5 cm. There is upper abdomen fat stranding although this may represent edema. The appearance somewhat resembles omental metastatic disease, particularly in the right mid abdomen, images 46-49 of series 8. The bowel loops are normal in caliber. There is a large simple appearing right renal cyst. The kidneys otherwise appear unremarkable. The pancreas, gallbladder, and adrenal glands appear unremarkable.  Regarding the lower abdomen and pelvis, no mass or adenopathy is seen. There is a relatively small amount of pelvic ascites. Delayed venous phase images show contrast opacification of normal caliber renal collecting systems, ureters and bladder. The bony structures appear grossly intact.  IMPRESSION: 1. Mild-to-moderate ascites. 2. Apparently thrombosed left portal vein, with no visible portal vein contrast in the left lobe, and delayed parenchymal enhancement as a  result. 3. Rather dense reticular disease of the right upper quadrant omental fat, possibly related to the patient's ascites but worrisome for omental metastatic disease. Similar milder changes of the omental fat elsewhere.  NOTE: Preliminary findings of this exam were discussed with the ordering provider, MARK Travis, at 2:20 PM 03/01/2021.  D:  03/01/2021 E:  03/01/2021  This report was finalized on 3/1/2021 10:03 PM by Dr. Michael Dennis MD.      XR Chest PA & Lateral    Result Date: 3/2/2021  EXAMINATION: XR CHEST, PA AND LATERAL-03/02/2021:  INDICATION: Port thoracentesis, concern for malignant effusion, eval for revealed mass; V52-Wdjwyvz effusion, not elsewhere classified; R93.5-Abnormal findings on diagnostic imaging of other abdominal regions, including retroperitoneum; R06.00-Dyspnea, unspecified; R77.8-Other specified abnormalities of plasma proteins.  COMPARISON: Chest x-ray 03/01/2021.  FINDINGS: Cardiac size enlarged and obscured from persistent moderate-to- large volume left partially layering pleural effusion although decreased from prior comparison status post thoracentesis. Right basilar opacifications of small-to- moderate pleural effusion with adjacent atelectasis versus airspace disease.      Status post thoracentesis with decrease of left pleural effusion, however, persistent moderate-to-large left effusion remaining along with a small-to-moderate right pleural effusion additionally noted and adjacent opacifications.  D:  03/02/2021 E:  03/02/2021  This report was finalized on 3/2/2021 6:30 PM by Dr. Arturo Melendez.      Duplex Portal Hepatic Complete CAR    Result Date: 3/6/2021  EXAMINATION: DUPLEX PORTAL HEPATIC COMPLETE CAR-  INDICATION: Portal vein thrombosis.  COMPARISON: None.  FINDINGS: Patient history indicates portal vein thrombosis. Main portal vein and right portal vein appear patent, but with abnormal, irregular flow. Left portal vein appears filled with echogenic thrombus. Main  portal vein does show hepatopedal flow. Hepatic veins show expected hepatofugal flow. IVC and splenic vein appear patent. Superior mesenteric vein is imaged with difficulty, perhaps with reduced flow, although this may be affected by angle of insonation.  The sonographer also notes pleural effusions. By history left pleural drainage catheter was subsequently placed.      1. Thrombus filling the left portal vein. No visible left portal vein flow. 2. Sluggish abnormal flow in the main and right portal vein, but which appears hepatopedal. No obvious thrombus.  D:  03/05/2021 E:  03/05/2021  This report was finalized on 3/6/2021 12:44 AM by Dr. Michael Dennis MD.      CT Guided Thoracentesis    Result Date: 3/1/2021  PROCEDURE: CT-guided thoracentesis  Procedural Personnel Attending physician(s): YANI Graff M.D. Fellow physician(s): None Resident physician(s): None Advanced practice provider(s): None  Pre-procedure diagnosis: ER with SOA. Left >right pleural effusion Post-procedure diagnosis: Same Indication: Diagnostic/therapeutic Additional clinical history: None  Complications: No immediate complications.       CT-guided thoracentesis with drainage of 2000 mL of dark yellow/cloudy fluid. Portion of fluid sent for requested laboratory analysis.  Plan:  Resume care by clinical team. _______________________________________________________________  PROCEDURE SUMMARY: - Limited thoracic CT - CT-guided thoracentesis - Additional procedure(s): None  PROCEDURE DETAILS:  Pre-procedure Consent: Informed consent for the procedure including risks, benefits and alternatives was obtained and time-out was performed prior to the procedure. Preparation: The site was prepared and draped using maximal sterile barrier technique including cutaneous antisepsis.  Anesthesia/sedation Level of anesthesia/sedation: No sedation  Limited thoracic CT Limited thoracic CT was performed. A safe window for thoracentesis was identified. Left hemithorax  findings: Large pleural fluid collection  Thoracentesis Local anesthesia was administered. The pleural space was accessed using trocar technique  and fluid return confirmed position. The fluid was drained. The catheter was removed, and a sterile bandage was applied. Catheter placed: 6.5 [place]ri[place] nonlocking pigtail Post-drainage hemithorax findings: Significant residual pleural fluid collection. No immediate complication  Radiation Dose CT dose length product (mGy-cm): 423  Additional Details Additional description of procedure: None Equipment details: None Specimens removed: Pleural fluid Estimated blood loss (mL): Less than 10 Standardized report: Thoracentesis  Attestation I was present and scrubbed for the entire procedure. Imaging reviewed. Agree with final report as written.   This report was finalized on 3/1/2021 4:21 PM by Raheem Graff.        Results for orders placed during the hospital encounter of 03/01/21    Duplex Venous Lower Extremity - Bilateral CAR    Interpretation Summary  · Acute right lower extremity deep vein thrombosis noted in the peroneal.  · Acute right lower extremity superficial thrombophlebitis noted in the small saphenous.  · Acute left lower extremity superficial thrombophlebitis noted in the small saphenous.  · All other veins appeared normal bilaterally.      Results for orders placed during the hospital encounter of 03/01/21    Duplex Venous Lower Extremity - Bilateral CAR    Interpretation Summary  · Acute right lower extremity deep vein thrombosis noted in the peroneal.  · Acute right lower extremity superficial thrombophlebitis noted in the small saphenous.  · Acute left lower extremity superficial thrombophlebitis noted in the small saphenous.  · All other veins appeared normal bilaterally.      Results for orders placed during the hospital encounter of 03/01/21    Adult Transthoracic Echo Complete W/ Cont if Necessary Per Protocol    Interpretation Summary  · Left ventricular  ejection fraction appears to be 41 - 45%. Left ventricular systolic function is moderately decreased.  · Left ventricular diastolic function is consistent with (grade II w/high LAP) pseudonormalization.  · The left ventricular cavity is mildly dilated.  · Left ventricular wall thickness is consistent with mild concentric hypertrophy.  · Moderate tricuspid valve regurgitation is present.  · Estimated right ventricular systolic pressure from tricuspid regurgitation is markedly elevated (>55 mmHg).  · Moderate mitral valve regurgitation is present.  · There is a left pleural effusion.  · There is a small (<1cm) pericardial effusion.      Plan for Follow-up of Pending Labs/Results: PCP. Dr Desouza   Pending Labs     Order Current Status    AFB Culture - Body Fluid, Pleural Cavity Preliminary result    Fungus Culture - Body Fluid, Pleural Cavity Preliminary result        Discharge Details        Discharge Medications      New Medications      Instructions Start Date   apixaban 5 MG tablet tablet  Commonly known as: ELIQUIS   10 mg, Oral, Every 12 Hours Scheduled      apixaban 5 MG tablet tablet  Commonly known as: ELIQUIS   5 mg, Oral, Every 12 Hours Scheduled   Start Date: March 18, 2021     linagliptin 5 MG tablet tablet  Commonly known as: TRADJENTA   5 mg, Oral, Daily   Start Date: March 13, 2021     midodrine 10 MG tablet  Commonly known as: PROAMATINE   10 mg, Oral, Every 8 Hours Scheduled      ondansetron 4 MG tablet  Commonly known as: ZOFRAN   4 mg, Oral, Every 6 Hours PRN         Stop These Medications    enalapril 10 MG tablet  Commonly known as: VASOTEC     metFORMIN 500 MG tablet  Commonly known as: GLUCOPHAGE            No Known Allergies      Discharge Disposition:  Home or Self Care    Diet:  Hospital:  Diet Order   Procedures   • Diet Regular; Consistent Carbohydrate       Activity:  Activity Instructions     Activity as Tolerated                 CODE STATUS:    Code Status and Medical Interventions:    Ordered at: 03/01/21 1710     Code Status:    CPR     Medical Interventions (Level of Support Prior to Arrest):    Full       Future Appointments   Date Time Provider Department Center   3/25/2021  1:30 PM Anupama Desouza MD [facility]   4/4/2021  9:00 AM [facility]       Additional Instructions for the Follow-ups that You Need to Schedule     Ambulatory Referral to Home Health   As directed      Face to Face Visit Date: 3/12/2021    Follow-up provider for Plan of Care?: I treated the patient in an acute care facility and will not continue treatment after discharge.    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, Congestive Heart Failure    Describe mobility limitations that make leaving home difficult: Impaired Functional Mobility, severe weakness    Nursing/Therapeutic Services Requested: Skilled Nursing Physical Therapy Occupational Therapy    Skilled nursing orders: Other (Management of Pleurx  drain, See Pleurx orders)    PT orders: Therapeutic exercise Transfer training Strengthening Home safety assessment    Occupational orders: Activities of daily living Strengthening Home safety assessment    Frequency: 1 Week 1         Discharge Follow-up with PCP   As directed       Currently Documented PCP:    Jef Grant MD    PCP Phone Number:    [phone]     Follow Up Details: 1 week         Discharge Follow-up with Specified Provider: Dr Desouza; 2 Weeks   As directed      To: Dr Desouza    Follow Up: 2 Weeks                     MARK Munroe  03/12/21      Time Spent on Discharge:  I spent  45  minutes on this discharge activity which included: face-to-face encounter with the patient, reviewing the data in the system, coordination of the care with the nursing staff as well as consultants, documentation, and entering orders.

## 2021-03-12 NOTE — PROGRESS NOTES
Case Management Discharge Note      Final Note: Spoke with patient in room.  His plan is still to reeturn home with [facility] for PT, OT and skilled nursing.  Notified Jem that patient would be discharging today.  Nurse will drain PleurX prior to discharge and provide patient with home kit.  Patient denies any other discharge needs.  Farideh Hilario RN [phone]    Provided Post Acute Provider List?: N/A  Provided Post Acute Provider Quality & Resource List?: N/A    Selected Continued Care - Admitted Since 3/1/2021     Destination    No services have been selected for the patient.              Durable Medical Equipment    No services have been selected for the patient.              Dialysis/Infusion    No services have been selected for the patient.              Home Medical Care     Service Provider Selected Services Address Phone Fax Patient Preferred    [facility]  Home Health Services [address] [facility] [address] [phone] --          Therapy    No services have been selected for the patient.              Community Resources    No services have been selected for the patient.                       Final Discharge Disposition Code: 06 - home with home health care

## 2021-03-12 NOTE — PROGRESS NOTES
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  Waiting for pleurx drainage and teaching and then will be going home.  Rolling 4 wheel walker delivered to room.      REVIEW OF SYSTEMS:  A 14 point review of systems was performed and is negative except as noted above.      Objective     Vitals:    03/12/21 0700 03/12/21 0850 03/12/21 0900 03/12/21 1100   BP: 102/58 98/69  (!) 88/52   BP Location: Right arm Right arm  Right arm   Patient Position: Sitting Sitting  Sitting   Pulse: 78 91 100 82   Resp: 16 16  16   Temp:  98 °F (36.7 °C)  97.5 °F (36.4 °C)   TempSrc:  Oral  Infrared   SpO2:  94%     Weight:       Height:                       Performance Status: 3  General: cachectic appearing male in no acute distress  Neuro: alert and oriented  HEENT: sclerae anicteric, oropharynx clear  Lymphatics: no cervical, supraclavicular, or axillary adenopathy  Cardiovascular: regular rate and rhythm, no murmurs  Lungs: decreased bilateral bases  Abdomen: soft, nontender, nondistended.  No palpable organomegaly  Extremities: no lower extremity edema  Skin: no rashes, lesions, bruising, or petechiae  Psych: mood and affect appropriate      Exam is unchanged    Labs:       WBC   Date Value Ref Range Status   03/11/2021 9.45 3.40 - 10.80 10*3/mm3 Final     RBC   Date Value Ref Range Status   03/11/2021 3.53 (L) 4.14 - 5.80 10*6/mm3 Final     Hemoglobin    Date Value Ref Range Status   03/11/2021 9.9 (L) 13.0 - 17.7 g/dL Final     Hematocrit   Date Value Ref Range Status   03/11/2021 30.8 (L) 37.5 - 51.0 % Final     MCV   Date Value Ref Range Status   03/11/2021 87.3 79.0 - 97.0 fL Final     MCH   Date Value Ref Range Status   03/11/2021 28.0 26.6 - 33.0 pg Final     MCHC   Date Value Ref Range Status   03/11/2021 32.1 31.5 - 35.7 g/dL Final     RDW   Date Value Ref Range Status   03/11/2021 13.3 12.3 - 15.4 % Final     RDW-SD   Date Value Ref Range Status   03/11/2021 42.3 37.0 - 54.0 fl Final     MPV   Date Value Ref Range Status   03/11/2021 9.9 6.0 - 12.0 fL Final     Platelets   Date Value Ref Range Status   03/11/2021 266 140 - 450 10*3/mm3 Final     Neutrophil %   Date Value Ref Range Status   03/11/2021 66.2 42.7 - 76.0 % Final     Lymphocyte %   Date Value Ref Range Status   03/11/2021 14.7 (L) 19.6 - 45.3 % Final     Monocyte %   Date Value Ref Range Status   03/11/2021 16.0 (H) 5.0 - 12.0 % Final     Eosinophil %   Date Value Ref Range Status   03/11/2021 2.2 0.3 - 6.2 % Final     Basophil %   Date Value Ref Range Status   03/11/2021 0.5 0.0 - 1.5 % Final     Immature Grans %   Date Value Ref Range Status   03/11/2021 0.4 0.0 - 0.5 % Final     Neutrophils, Absolute   Date Value Ref Range Status   03/11/2021 6.25 1.70 - 7.00 10*3/mm3 Final     Lymphocytes, Absolute   Date Value Ref Range Status   03/11/2021 1.39 0.70 - 3.10 10*3/mm3 Final     Monocytes, Absolute   Date Value Ref Range Status   03/11/2021 1.51 (H) 0.10 - 0.90 10*3/mm3 Final     Eosinophils, Absolute   Date Value Ref Range Status   03/11/2021 0.21 0.00 - 0.40 10*3/mm3 Final     Basophils, Absolute   Date Value Ref Range Status   03/11/2021 0.05 0.00 - 0.20 10*3/mm3 Final     Immature Grans, Absolute   Date Value Ref Range Status   03/11/2021 0.04 0.00 - 0.05 10*3/mm3 Final     nRBC   Date Value Ref Range Status   03/11/2021 0.0 0.0 - 0.2 /100 WBC Final       Glucose   Date Value Ref Range  Status   03/11/2021 150 (H) 65 - 99 mg/dL Final     BUN   Date Value Ref Range Status   03/11/2021 13 8 - 23 mg/dL Final     Creatinine   Date Value Ref Range Status   03/11/2021 0.51 (L) 0.76 - 1.27 mg/dL Final     Sodium   Date Value Ref Range Status   03/11/2021 129 (L) 136 - 145 mmol/L Final     Potassium   Date Value Ref Range Status   03/11/2021 4.6 3.5 - 5.2 mmol/L Final     Chloride   Date Value Ref Range Status   03/11/2021 95 (L) 98 - 107 mmol/L Final     CO2   Date Value Ref Range Status   03/11/2021 27.0 22.0 - 29.0 mmol/L Final     Calcium   Date Value Ref Range Status   03/11/2021 8.4 (L) 8.6 - 10.5 mg/dL Final     Albumin   Date Value Ref Range Status   03/11/2021 2.50 (L) 3.50 - 5.20 g/dL Final     eGFR Non  Amer   Date Value Ref Range Status   03/11/2021 >150 >60 mL/min/1.73 Final     BUN/Creatinine Ratio   Date Value Ref Range Status   03/11/2021 25.5 (H) 7.0 - 25.0 Final     Anion Gap   Date Value Ref Range Status   03/11/2021 7.0 5.0 - 15.0 mmol/L Final       Lab Results   Component Value Date    CEA 0.75 03/05/2021               Assessment/Plan     Danny Alvarez Jr. is a 75 y.o. year old male  with metastatic malignancy with bilateral large effusion, possible omental disease, portal vein thrombosis and lower extremity DVT.     Metastatic adenocarcinoma: follow up scheduled with me 3/25 for further discussion of options.    Anorexia: would continue marinol at discharge.    Bilateral pleural effusions with hydropneumothorax:  pleurx on right.  Continue drainage PRN at home.     Portal vein thrombosis/DVT: secondary to metastatic malignancy.  Will need indefinite anticoagulation. Currently on eliquis.     DM: on tradjenta                  Anupama Desouza MD  3/12/2021

## 2021-03-12 NOTE — PROGRESS NOTES
"Danny Alvarez Jr.  [id number]  1945     LOS: 11 days   Patient Care Team:  Jef Beckham MD as PCP - General (Internal Medicine)    Chief Complaint: Malignant pleural effusions      Subjective: No complaints, anxious to go home    Objective:     Vital Sign Min/Max for last 24 hours  Temp  Min: 97.7 °F (36.5 °C)  Max: 97.9 °F (36.6 °C)   BP  Min: 92/59  Max: 113/73   Pulse  Min: 69  Max: 84   Resp  Min: 16  Max: 18   SpO2  Min: 93 %  Max: 99 %   No data recorded   Weight  Min: 75 kg (165 lb 6.4 oz)  Max: 75 kg (165 lb 6.4 oz)     Flowsheet Rows      First Filed Value   Admission Height  177.8 cm (70\") Documented at 03/01/2021 1027   Admission Weight  74.8 kg (165 lb) Documented at 03/01/2021 1027          Physical Exam: Vital signs stable no shortness of breath or diaphoresis    Wound:    Pulses:     Mediastinal and Chest Tube Drainage:       Results Review:   Results from last 7 days   Lab Units 03/11/21  0541   WBC 10*3/mm3 9.45   HEMOGLOBIN g/dL 9.9*   HEMATOCRIT % 30.8*   PLATELETS 10*3/mm3 266     Results from last 7 days   Lab Units 03/11/21  0541   SODIUM mmol/L 129*   POTASSIUM mmol/L 4.6   CHLORIDE mmol/L 95*   CO2 mmol/L 27.0   BUN mg/dL 13   CREATININE mg/dL 0.51*   GLUCOSE mg/dL 150*   CALCIUM mg/dL 8.4*             Assessment      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  Patient may be discharged.  I will see back as needed.        Richy Reyes MD  03/12/21  04:05 EST      Please note that portions of this note were completed with a voice " recognition program. Efforts were made to edit the dictations, but words may be mistranscribed

## 2021-03-13 ENCOUNTER — READMISSION MANAGEMENT (OUTPATIENT)
Dept: CALL CENTER | Facility: HOSPITAL | Age: 76
End: 2021-03-13

## 2021-03-13 NOTE — OUTREACH NOTE
Prep Survey      Responses   [facility] facility patient discharged from?  [place]   Is LACE score < 7 ?  No   Emergency Room discharge w/ pulse ox?  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  Other   Does the patient have Home health ordered?  Yes   What is the Home health agency?   [facility]   Is there a DME ordered?  No   Prep survey completed?  Yes          Drea Barber RN

## 2021-03-16 ENCOUNTER — READMISSION MANAGEMENT (OUTPATIENT)
Dept: CALL CENTER | Facility: HOSPITAL | Age: 76
End: 2021-03-16

## 2021-03-16 NOTE — OUTREACH NOTE
Medical Week 1 Survey      Responses   [facility] patient discharged from?  [place]   Does the patient have one of the following disease processes/diagnoses(primary or secondary)?  Other   Week 1 attempt successful?  No   Unsuccessful attempts  Attempt 1          Sherri Nowak RN

## 2021-03-17 ENCOUNTER — READMISSION MANAGEMENT (OUTPATIENT)
Dept: CALL CENTER | Facility: HOSPITAL | Age: 76
End: 2021-03-17

## 2021-03-17 NOTE — OUTREACH NOTE
Medical Week 1 Survey      Responses   [facility] patient discharged from?  [place]   Does the patient have one of the following disease processes/diagnoses(primary or secondary)?  Other   Week 1 attempt successful?  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  Yes   List who call center can speak with  wife    Person spoke with today (if not patient) and relationship  wife - Sania    Meds reviewed with patient/caregiver?  Yes   Is the patient having any side effects they believe may be caused by any medication additions or changes?  No   Is the patient taking all medications as directed (includes completed medication regime)?  Yes   Does the patient have a primary care provider?   Yes   Does the patient have an appointment with their PCP within 7 days of discharge?  Greater than 7 days   Nursing Interventions  Verified appointment date/time/provider   Has the patient kept scheduled appointments due by today?  N/A   Nursing Interventions  Advised patient to keep appointment   Comments  She sees the provider in 2 weeks.    What is the Home health agency?   [facility]   Has home health visited the patient within 72 hours of discharge?  Yes   Has all DME been delivered?  Yes   Psychosocial issues?  No   Did the patient receive a copy of their discharge instructions?  Yes   Nursing interventions  Reviewed instructions with patient, Educated on MyChart   What is the patient's perception of their health status since discharge?  Same   Is the patient/caregiver able to teach back signs and symptoms related to disease process for when to call PCP?  Yes   Is the patient/caregiver able to teach back signs and symptoms related to disease process for when to call 911?  Yes   Is the patient/caregiver able to teach back the hierarchy of who to call/visit for symptoms/problems? PCP,  Specialist, Home health nurse, Urgent Care, ED, 911  Yes   If the patient is a current smoker, are they able to teach back resources for cessation?  Not a smoker   Additional teach back comments  His wife is taking very good of him, she is taking excellent care of him. The drain is getting off 500 ml at a time. He has a Right Pleurex drain. Advised that she take good care of herself.    Week 1 call completed?  Yes   Graduated/Revoked comments  She is taking excellant care of her - He has a plurex drain to the right chest HH- is emptying it.  It drains 500 ml of clear fluid today. He is eating homemade chicken soup, yogurt and drinking gatorade. She is keeping him away from others.           Anupama Hall RN

## 2021-03-23 ENCOUNTER — READMISSION MANAGEMENT (OUTPATIENT)
Dept: CALL CENTER | Facility: HOSPITAL | Age: 76
End: 2021-03-23

## 2021-03-23 NOTE — OUTREACH NOTE
Medical Week 2 Survey      Responses   [facility] patient discharged from?  [place]   Does the patient have one of the following disease processes/diagnoses(primary or secondary)?  Other   Week 2 attempt successful?  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  Yes   List who call center can speak with  wife    Person spoke with today (if not patient) and relationship  wife - Sania    Meds reviewed with patient/caregiver?  Yes   Is the patient having any side effects they believe may be caused by any medication additions or changes?  No   Is the patient taking all medications as directed (includes completed medication regime)?  Yes   Does the patient have a primary care provider?   Yes   Does the patient have an appointment with their PCP within 7 days of discharge?  Yes   Has the patient kept scheduled appointments due by today?  Yes   Comments  He has seen the provider.    Did the patient receive a copy of their discharge instructions?  Yes   Nursing interventions  Reviewed instructions with patient, Educated on MyChart   What is the patient's perception of their health status since discharge?  Improving [He has the Pleurex Cath. It is draining clear light yellow, 520 ml. HH is coming to see the patient. ]   Is the patient/caregiver able to teach back signs and symptoms related to disease process for when to call PCP?  Yes   Is the patient/caregiver able to teach back signs and symptoms related to disease process for when to call 911?  Yes   Is the patient/caregiver able to teach back the hierarchy of who to call/visit for symptoms/problems? PCP, Specialist, Home health nurse, Urgent Care, ED, 911  Yes   Additional teach back comments  His wife is taking care of him very well. He drained 520 ml ans has clear yellow light drainage.He has a right Plurex drain.     Week 2 Call Completed?  Yes          Anupama Hall RN

## 2021-03-24 NOTE — PROGRESS NOTES
"      PROBLEM LIST:  1. Metastatic adenocarcinoma  A) He was admitted on 3/1/21 with 50 lb weight loss, shortness of breath.  He was found to have bilateral pleural effusions, protal vein thrombosis, omental abnormalities concerning for metastases.  Thoracentesis was performed with left chest tube placement.  Subsequently a peroneal vein thrombosis was noted.  pleural fluid cytology showed adenocarcinoma, consistent with pancreaticobiliary origin.  Ca 19-9 736.6  2. Portal vein thrombosis  3. Hypertension  4. FLAKO  5. Type 2 DM  6. CHF    Subjective     CHIEF COMPLAINT: metastatic adenocarinoma    HISTORY OF PRESENT ILLNESS:   Danny Alvarez Jr. returns for follow-up.   Since leaving the hospital he feels that he has been improving.  He is draining about 500 mL from his Pleurx tube every other day.  His breathing has been stable.  He has pain in his chest if the fluid is withdrawn too quickly but otherwise no significant pain.  His appetite seems better.  He had the decayed tooth pulled from his mouth within the past several days and this has made a big difference in how he feels.  The bad taste of food is improving.  He is currently taking amoxicillin for this.  His strength seems to be improving as well.  He has been able to walk around outside a little bit and is moving around more quickly in the house.      Objective      BP 92/57   Pulse 86   Temp 97.3 °F (36.3 °C) (Temporal)   Resp 16   Ht 177.8 cm (70\")   Wt 75.8 kg (167 lb)   SpO2 93%   BMI 23.96 kg/m²      Performance Status:2      General: Cachectic male in no acute distress  Neuro: alert and oriented  HEENT: sclera anicteric, oropharynx clear  Lymphatics: no cervical, supraclavicular, or axillary adenopathy  Cardiovascular: regular rate and rhythm, no murmurs  Lungs: Decreased at bases bilaterally  Abdomen: Mildly distended abdomen  Extremeties: no lower extremity edema  Skin: no rashes, lesions, bruising, or petechiae  Psych: mood and affect " appropriate      RECENT LABS:  Lab Results   Component Value Date    WBC 9.45 03/11/2021    HGB 9.9 (L) 03/11/2021    HCT 30.8 (L) 03/11/2021    MCV 87.3 03/11/2021     03/11/2021       Lab Results   Component Value Date    GLUCOSE 150 (H) 03/11/2021    BUN 13 03/11/2021    CREATININE 0.51 (L) 03/11/2021    EGFRIFNONA >150 03/11/2021    BCR 25.5 (H) 03/11/2021    K 4.6 03/11/2021    CO2 27.0 03/11/2021    CALCIUM 8.4 (L) 03/11/2021    ALBUMIN 2.50 (L) 03/11/2021    AST 65 (H) 03/04/2021    ALT 13 03/04/2021       XR Chest 1 View  Narrative: EXAMINATION: XR CHEST 1 VW-     INDICATION: Left effusion, left SBCT; U64-Hzmbxjm effusion, not  elsewhere classified; R93.5-Abnormal findings on diagnostic imaging of  other abdominal regions, including retroperitoneum; R06.00-Dyspnea,  unspecified; R77.8-Other specified abnormalities of plasma proteins;  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     COMPARISON: 03/11/2021.     FINDINGS: Right pleural drain is seen in place. Left thoracotomy tube  has been removed. There is a persistent left basilar and left apical  pneumothorax, not significantly changed from yesterday's exam. No right  pneumothorax is seen. Left basilar atelectasis appears stable. Mild  perihilar disease appears stable.     Impression: Left pleural drain removal with stable left pneumothorax and  stable left basilar atelectasis.      D:  03/12/2021  E:  03/12/2021     This report was finalized on 3/12/2021 9:55 PM by Dr. Michael Dennis MD.         I personally reviewed the imaging studies with the patient and his wife.        Assessment/Plan   Danny Alvarez Jr. is a 75 y.o. 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    Overall he has improved since hospital discharge in terms of appetite and strength.    We discussed the decision of either trying to treat his metastatic malignancy with the goal of palliation, versus focusing only on supportive care and considering hospice  enrollment.  He understands that his cancer is not curable.  It is difficult to estimate prognosis, but it is possible that chemotherapy could add months to his life, but the main goal of treatment would be to try to improve quality of life.  If he were to decide to receive treatment I would likely recommend Gemzar and Abraxane every other week.  We reviewed the potential side effects of this treatment including myelosuppression, neutropenic fever, neuropathy,  nausea/vomiting, fatigue, and diarrhea or constipation.    We discussed that there is a small chance there could be a targetable mutation in his tumor.  He does not have adequate tissue from his pleural fluid cytology to do this testing, and there are no other obvious solid tumor sites for biopsy.  However we did draw plasma circulating tumor DNA testing today.  He would like to wait for these results and take more time to try to build his strength before making any final decisions.  He will follow-up in 2 to 3 weeks.    Total time of patient care on day of service including time prior to, face to face with patient, and following visit spent in reviewing records, lab results, imaging studies, discussion with patient, and documentation/charting was > 40 minutes.                            Anupama Desouza MD  [facility]    3/25/2021          CC:

## 2021-03-25 ENCOUNTER — LAB (OUTPATIENT)
Dept: LAB | Facility: HOSPITAL | Age: 76
End: 2021-03-25

## 2021-03-25 ENCOUNTER — OFFICE VISIT (OUTPATIENT)
Dept: ONCOLOGY | Facility: CLINIC | Age: 76
End: 2021-03-25

## 2021-03-25 VITALS
HEIGHT: 70 IN | BODY MASS INDEX: 23.91 KG/M2 | TEMPERATURE: 97.3 F | WEIGHT: 167 LBS | DIASTOLIC BLOOD PRESSURE: 57 MMHG | RESPIRATION RATE: 16 BRPM | HEART RATE: 86 BPM | OXYGEN SATURATION: 93 % | SYSTOLIC BLOOD PRESSURE: 92 MMHG

## 2021-03-25 DIAGNOSIS — C25.3 MALIGNANT NEOPLASM OF PANCREATIC DUCT (HCC): Primary | ICD-10-CM

## 2021-03-25 DIAGNOSIS — C25.3: Primary | ICD-10-CM

## 2021-03-25 PROCEDURE — 99215 OFFICE O/P EST HI 40 MIN: CPT | Performed by: INTERNAL MEDICINE

## 2021-03-25 RX ORDER — AMOXICILLIN 875 MG/1
TABLET, COATED ORAL
COMMUNITY
Start: 2021-03-23

## 2021-03-31 ENCOUNTER — TELEPHONE (OUTPATIENT)
Dept: ONCOLOGY | Facility: CLINIC | Age: 76
End: 2021-03-31

## 2021-03-31 ENCOUNTER — READMISSION MANAGEMENT (OUTPATIENT)
Dept: CALL CENTER | Facility: HOSPITAL | Age: 76
End: 2021-03-31

## 2021-03-31 NOTE — TELEPHONE ENCOUNTER
Provider: ANASTACIO  Caller: NIA  Relationship to Patient: [facility]     Phone Number: [phone] OPT 1  Reason for Call:   JUST TO INFORM THE REPORT WILL BE DELAYED AND RELEASED BY April 6TH 2021.  [place] [id number] REPORT

## 2021-03-31 NOTE — OUTREACH NOTE
Medical Week 3 Survey      Responses   [facility] patient discharged from?  [place]   Does the patient have one of the following disease processes/diagnoses(primary or secondary)?  Other   Week 3 attempt successful?  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  Yes   Is the patient having any side effects they believe may be caused by any medication additions or changes?  Yes   Side effects comments   To Midodrine-he states it makes him nauseous, vomiting, heart burn and inability to eat.  PCP has advised to stop taking for one day to see if symptoms improve.   Prescription comments  Pt has been on amoxicllin due to absess of tooth   Is the patient taking all medications as directed (includes completed medication regime)?  Yes   Does the patient have a primary care provider?   Yes   Comments regarding PCP  Patient has f/u with PCP via phone   Does the patient have an appointment with their PCP within 7 days of discharge?  Yes   Has the patient kept scheduled appointments due by today?  Yes   What is the Home health agency?   [facility]   Has home health visited the patient within 72 hours of discharge?  Yes   Home health comments   visited and educated spouse on how to drain PleurX every other day.     Psychosocial issues?  No   Comments  102/60 BP.  Patient is struggling to eat with new medication.     Did the patient receive a copy of their discharge instructions?  Yes   What is the patient's perception of their health status since discharge?  Improving   If the patient is a current smoker, are they able to teach back resources for cessation?  Not a smoker   Week 3 Call Completed?  Yes   Wrap up additional comments  Lengthy conversation with both patient and spouse.  Spouse is draining plurex drain every other day.   Patient is also having a medication reaction and is in  communication with providers.          Mary Puente RN

## 2021-04-01 ENCOUNTER — TELEPHONE (OUTPATIENT)
Dept: ONCOLOGY | Facility: CLINIC | Age: 76
End: 2021-04-01

## 2021-04-01 DIAGNOSIS — R11.2 NAUSEA AND VOMITING, INTRACTABILITY OF VOMITING NOT SPECIFIED, UNSPECIFIED VOMITING TYPE: ICD-10-CM

## 2021-04-01 DIAGNOSIS — C25.3: Primary | ICD-10-CM

## 2021-04-01 RX ORDER — MIDODRINE HYDROCHLORIDE 10 MG/1
10 TABLET ORAL EVERY 8 HOURS SCHEDULED
Qty: 90 TABLET | Refills: 0 | Status: CANCELLED | OUTPATIENT
Start: 2021-04-01

## 2021-04-01 RX ORDER — PROCHLORPERAZINE MALEATE 10 MG
10 TABLET ORAL EVERY 6 HOURS PRN
Qty: 30 TABLET | Refills: 3 | Status: SHIPPED | OUTPATIENT
Start: 2021-04-01 | End: 2021-04-07

## 2021-04-01 NOTE — TELEPHONE ENCOUNTER
Caller: KATHLEEN      Relationship: SELF    Best call back number: [phone]    Medication needed:   Requested Prescriptions     Pending Prescriptions Disp Refills   • midodrine (PROAMATINE) 10 MG tablet 90 tablet 0     Sig: Take 1 tablet by mouth Every 8 (Eight) Hours.       PATIENT STATES THAT THE MEDICINE IS MAKING  HIM NASEUAS, HE IS URINATING MORE FREQUENTLY, HAS HEARTBURN, AND NO APPETITE.     PLEASE CALL AND ADVISE     THANK YOU

## 2021-04-04 ENCOUNTER — APPOINTMENT (OUTPATIENT)
Dept: PREADMISSION TESTING | Facility: HOSPITAL | Age: 76
End: 2021-04-04

## 2021-04-06 ENCOUNTER — TELEPHONE (OUTPATIENT)
Dept: ONCOLOGY | Facility: CLINIC | Age: 76
End: 2021-04-06

## 2021-04-06 DIAGNOSIS — J90 PLEURAL EFFUSION: ICD-10-CM

## 2021-04-06 DIAGNOSIS — J90 PLEURAL EFFUSION ON LEFT: Primary | ICD-10-CM

## 2021-04-06 NOTE — TELEPHONE ENCOUNTER
Patient wife called regarding symptoms of nausea, constipation and dehydration.  The wife said that the midodrine med had made him very ill and she stopped giving it a few days ago but has monitored his Bps several times a day and it has been running in the 90s over 60s.  She said since stopping the midodrine, his nausea is much improved and his heartburn is improved.  Yesterday he was able to drink a milkshake and eat a turkey sandwich.  She said he will take small meals but she is concerned that he is very dehydrated and when she checks his skin, it just tents up.  She said that he tried to use a fleets enema this morning with very minimal results.  I discussed all the side effects with dr. Desouza and she said he could hold off the midodrine unless he starts falling, or being off balance from his pressure.  Dr. Desozua said we could set the patient up for fluids and I called infusion and they can do IVF tomorrow.  I also will instruct patient wife about giving him laxative orally to help move the bowel down so he can be cleaned out.  I called the wife back and she will continue to monitor BP, she will give the patient milk of mag and prune juice but I said they may need to do mag citrate, 1/2 bottle and repeat if no results in 4 hours.  I also told the wife we had him set up for IVF tomorrow afternoon in [place] at 2:30.  She verbalized understanding of all the interventions and will have patient here.  She will focus on getting his constipation resolved today.

## 2021-04-07 ENCOUNTER — HOSPITAL ENCOUNTER (OUTPATIENT)
Dept: ONCOLOGY | Facility: HOSPITAL | Age: 76
Setting detail: INFUSION SERIES
Discharge: HOME | End: 2021-04-07

## 2021-04-07 ENCOUNTER — READMISSION MANAGEMENT (OUTPATIENT)
Dept: CALL CENTER | Facility: HOSPITAL | Age: 76
End: 2021-04-07

## 2021-04-07 ENCOUNTER — OFFICE VISIT (OUTPATIENT)
Dept: ONCOLOGY | Facility: CLINIC | Age: 76
End: 2021-04-07

## 2021-04-07 VITALS
SYSTOLIC BLOOD PRESSURE: 92 MMHG | RESPIRATION RATE: 20 BRPM | TEMPERATURE: 97.2 F | WEIGHT: 161 LBS | HEART RATE: 113 BPM | BODY MASS INDEX: 23.05 KG/M2 | HEIGHT: 70 IN | DIASTOLIC BLOOD PRESSURE: 63 MMHG

## 2021-04-07 VITALS
DIASTOLIC BLOOD PRESSURE: 63 MMHG | RESPIRATION RATE: 20 BRPM | HEIGHT: 70 IN | OXYGEN SATURATION: 98 % | BODY MASS INDEX: 23.05 KG/M2 | HEART RATE: 113 BPM | TEMPERATURE: 97.2 F | SYSTOLIC BLOOD PRESSURE: 92 MMHG | WEIGHT: 161 LBS

## 2021-04-07 DIAGNOSIS — C25.3: Primary | ICD-10-CM

## 2021-04-07 DIAGNOSIS — C25.3: ICD-10-CM

## 2021-04-07 DIAGNOSIS — J90 PLEURAL EFFUSION: ICD-10-CM

## 2021-04-07 DIAGNOSIS — J90 PLEURAL EFFUSION ON LEFT: ICD-10-CM

## 2021-04-07 DIAGNOSIS — J90 PLEURAL EFFUSION ON LEFT: Primary | ICD-10-CM

## 2021-04-07 LAB
ALBUMIN SERPL-MCNC: 2.6 G/DL (ref 3.5–5.2)
ALBUMIN/GLOB SERPL: 1.2 G/DL
ALP SERPL-CCNC: 144 U/L (ref 39–117)
ALT SERPL W P-5'-P-CCNC: 9 U/L (ref 1–41)
ANION GAP SERPL CALCULATED.3IONS-SCNC: 8 MMOL/L (ref 5–15)
AST SERPL-CCNC: 18 U/L (ref 1–40)
BILIRUB SERPL-MCNC: 0.3 MG/DL (ref 0–1.2)
BUN SERPL-MCNC: 26 MG/DL (ref 8–23)
BUN/CREAT SERPL: 28.6 (ref 7–25)
CALCIUM SPEC-SCNC: 8.4 MG/DL (ref 8.6–10.5)
CHLORIDE SERPL-SCNC: 97 MMOL/L (ref 98–107)
CO2 SERPL-SCNC: 29 MMOL/L (ref 22–29)
CREAT SERPL-MCNC: 0.91 MG/DL (ref 0.76–1.27)
ERYTHROCYTE [DISTWIDTH] IN BLOOD BY AUTOMATED COUNT: 15 % (ref 12.3–15.4)
GFR SERPL CREATININE-BSD FRML MDRD: 81 ML/MIN/1.73
GLOBULIN UR ELPH-MCNC: 2.1 GM/DL
GLUCOSE SERPL-MCNC: 152 MG/DL (ref 65–99)
HCT VFR BLD AUTO: 32.6 % (ref 37.5–51)
HGB BLD-MCNC: 10.4 G/DL (ref 13–17.7)
LYMPHOCYTES # BLD AUTO: 1.1 10*3/MM3 (ref 0.7–3.1)
LYMPHOCYTES NFR BLD AUTO: 12.1 % (ref 19.6–45.3)
MCH RBC QN AUTO: 25.9 PG (ref 26.6–33)
MCHC RBC AUTO-ENTMCNC: 31.8 G/DL (ref 31.5–35.7)
MCV RBC AUTO: 81.7 FL (ref 79–97)
MONOCYTES # BLD AUTO: 0.6 10*3/MM3 (ref 0.1–0.9)
MONOCYTES NFR BLD AUTO: 6.5 % (ref 5–12)
NEUTROPHILS NFR BLD AUTO: 7.7 10*3/MM3 (ref 1.7–7)
NEUTROPHILS NFR BLD AUTO: 81.4 % (ref 42.7–76)
PLATELET # BLD AUTO: 237 10*3/MM3 (ref 140–450)
PMV BLD AUTO: 7.6 FL (ref 6–12)
POTASSIUM SERPL-SCNC: 5.2 MMOL/L (ref 3.5–5.2)
PROT SERPL-MCNC: 4.7 G/DL (ref 6–8.5)
RBC # BLD AUTO: 3.99 10*6/MM3 (ref 4.14–5.8)
SODIUM SERPL-SCNC: 134 MMOL/L (ref 136–145)
WBC # BLD AUTO: 9.4 10*3/MM3 (ref 3.4–10.8)

## 2021-04-07 PROCEDURE — 99215 OFFICE O/P EST HI 40 MIN: CPT | Performed by: INTERNAL MEDICINE

## 2021-04-07 PROCEDURE — 85025 COMPLETE CBC W/AUTO DIFF WBC: CPT | Performed by: INTERNAL MEDICINE

## 2021-04-07 PROCEDURE — 96360 HYDRATION IV INFUSION INIT: CPT

## 2021-04-07 PROCEDURE — 80053 COMPREHEN METABOLIC PANEL: CPT | Performed by: INTERNAL MEDICINE

## 2021-04-07 RX ORDER — LACTULOSE 10 G/15ML
20 SOLUTION ORAL 3 TIMES DAILY
Qty: 240 ML | Refills: 2 | Status: SHIPPED | OUTPATIENT
Start: 2021-04-07

## 2021-04-07 RX ORDER — DRONABINOL 2.5 MG/1
5 CAPSULE ORAL
Qty: 60 CAPSULE | Refills: 0 | Status: SHIPPED | OUTPATIENT
Start: 2021-04-07

## 2021-04-07 RX ADMIN — SODIUM CHLORIDE 1000 ML: 9 INJECTION, SOLUTION INTRAVENOUS at 14:15

## 2021-04-07 NOTE — OUTREACH NOTE
Medical Week 4 Survey      Responses   [facility] patient discharged from?  [place]   Does the patient have one of the following disease processes/diagnoses(primary or secondary)?  Other   Week 4 attempt successful?  No          Amira Martinez RN

## 2021-04-07 NOTE — PROGRESS NOTES
"      PROBLEM LIST:  1. Metastatic adenocarcinoma  A) He was admitted on 3/1/21 with 50 lb weight loss, shortness of breath.  He was found to have bilateral pleural effusions, protal vein thrombosis, omental abnormalities concerning for metastases.  Thoracentesis was performed with left chest tube placement.  Subsequently a peroneal vein thrombosis was noted.  pleural fluid cytology showed adenocarcinoma, consistent with pancreaticobiliary origin.  Ca 19-9 736.6  2. Portal vein thrombosis and peroneal vein thrombosis, on eliquis  3. Hypertension  4. FLAKO  5. Type 2 DM  6. CHF    Subjective     CHIEF COMPLAINT: metastatic adenocarinoma    HISTORY OF PRESENT ILLNESS:   Danny Alvarez Jr. returns for follow-up.  His wife had contacted the office yesterday regarding nausea and constipation.  It seems that the midodrine was causing heartburn and nausea for him.  He has stopped that about 4 days ago and the symptoms do seem somewhat better.  However his appetite is very poor.  He is not eating well at all.  He did not have a bowel movement for several days.  He finally had 1 this morning that was fairly small caliber and loose.  This was after an enema and oral laxatives.  It was difficult for him to have all of these treatments at home.  He feels very weak.  He is unable to do much activity around the house.  A few weeks ago he was feeling strong enough that he was able to take a walk outside.  His abdomen is more distended.    Objective      BP 92/63   Pulse 113   Temp 97.2 °F (36.2 °C)   Resp 20   Ht 177.8 cm (70\")   Wt 73 kg (161 lb)   SpO2 98%   BMI 23.10 kg/m²      Performance Status:3    General: Cachectic male in no acute distress  Neuro: alert and oriented  HEENT: sclera anicteric  Abdomen: Moderately distended abdomen  Extremeties: 2+ extremity edema  Skin: Sallow skin, no jaundice  Psych: mood and affect appropriate      RECENT LABS:  Lab Results   Component Value Date    WBC 9.40 04/07/2021    HGB 10.4 (L) " 04/07/2021    HCT 32.6 (L) 04/07/2021    MCV 81.7 04/07/2021     04/07/2021       Lab Results   Component Value Date    GLUCOSE 150 (H) 03/11/2021    BUN 13 03/11/2021    CREATININE 0.51 (L) 03/11/2021    EGFRIFNONA >150 03/11/2021    BCR 25.5 (H) 03/11/2021    K 4.6 03/11/2021    CO2 27.0 03/11/2021    CALCIUM 8.4 (L) 03/11/2021    ALBUMIN 2.50 (L) 03/11/2021    AST 65 (H) 03/04/2021    ALT 13 03/04/2021                   Assessment/Plan   Danny Alvarez Jr. is a 75 y.o. 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    Over the past few weeks he has had significant decline with poor p.o. intake, constipation, and increasing abdominal distention, as well as weakness.  It is certainly possible that side effects from midodrine have contributed to this, but I am concerned progression of his cancer is the underlying cause.  At this point I do not think he is a candidate for chemotherapy given his significant weakness and overall decline.    We discussed the results of his molecular and genetic testing drawn from peripheral blood.  There were no targetable mutations identified, and MSI is stable.    He would like to have PET scan imaging for reevaluation of his cancer and to see if there are other any previously unidentified sites of involvement.  I will order this today.  He is also interested in potentially seeking a second opinion at .  I offered to make a referral for him to help arrange this.  He would like to hold off on this for now but will call if he needs assistance.    We discussed the option of hospice care.  He is open to this idea, but again we will hold off on this while we get a little more information.    We spent some time discussing strategies for managing his constipation.  I think that the fact that he has peritoneal carcinomatosis is the main reason for his difficulty.  I would recommend a combination of over-the-counter bowel medications, and I also will prescribe  lactulose.    In terms of his nausea and poor appetite I have prescribed Marinol.  He was taking this in the hospital and felt that it was beneficial.    I will contact him when PET scan results are available.    Total time of patient care on day of service including time prior to, face to face with patient, and following visit spent in reviewing records, lab results, imaging studies, discussion with patient, and documentation/charting was > 70 minutes.                            Anupama Desouza MD  [facility]    4/7/2021          CC:

## 2021-04-09 ENCOUNTER — TELEPHONE (OUTPATIENT)
Dept: ONCOLOGY | Facility: CLINIC | Age: 76
End: 2021-04-09

## 2021-04-09 NOTE — TELEPHONE ENCOUNTER
Caller: CHAITANYA    Relationship: [facility]    Best call back number: [phone]    What is the best time to reach you: NA    Who are you requesting to speak with (clinical staff, provider,  specific staff member):CLINICAL    Do you know the name of the person who called: ROLO    What was the call regarding: QUESTIONS ON PA FOR  dronabinol (Marinol) 2.5 MG capsule [9904] (Order [id number])    DX OF CANCER SUBMITTED. IS MEMBER CURRENTLY REC CHEMO? IS THIS BEING USED FOR CHEMO?  IS THIS BEING USED 48 HRS AFTER CHEMO VIA IV?    QUANTITY LIMIT?    Do you require a callback: YES

## 2021-04-12 ENCOUNTER — HOSPITAL ENCOUNTER (OUTPATIENT)
Dept: PET IMAGING | Facility: HOSPITAL | Age: 76
End: 2021-04-12

## 2021-04-12 ENCOUNTER — APPOINTMENT (OUTPATIENT)
Dept: PET IMAGING | Facility: HOSPITAL | Age: 76
End: 2021-04-12

## 2021-04-12 LAB
FUNGUS WND CULT: NORMAL
MYCOBACTERIUM SPEC CULT: NORMAL
NIGHT BLUE STAIN TISS: NORMAL

## 2024-06-10 NOTE — THERAPY TREATMENT NOTE
Patient Name: Danny Alvarez Jr.  : 1945    MRN: [id number]                              Today's Date: 3/11/2021       Admit Date: 3/1/2021    Visit Dx:     ICD-10-CM ICD-9-CM   1. Pleural effusion  J90 511.9   2. Abnormal CT of the abdomen  R93.5 793.6   3. Dyspnea on exertion  R06.00 786.09   4. 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)     Past Medical History:   Diagnosis Date   • Adenocarcinoma (CMS/HCC)    • Ascites    • CHF (congestive heart failure) (CMS/HCC)    • Diabetes mellitus (CMS/HCC)    • DVT (deep vein thrombosis) in pregnancy     Right   • Hypertension    • Hyponatremia    • Pleural effusion     Right   • 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) 3/11/2021     Past Surgical History:   Procedure Laterality Date   • CLAVICLE SURGERY     • PLEURAL CATHETER INSERTION Right 3/9/2021    Procedure: PLEURX CATHETER INSERTION WITH DRAINAGE OF PLEURAL EFFUSION LEFT CHEST TUBE INSERTION RIGHT;  Surgeon: Richy Reyes MD;  Location: [facility];  Service: Cardiothoracic;  Laterality: Right;  fluoro: 6 seconds  dose: 7 mGy     General Information     Row Name 21 1025          OT Time and Intention    Document Type  therapy note (daily note)  -AC     Mode of Treatment  occupational therapy  -AC     Row Name 21 1025          General Information    Patient Profile Reviewed  yes  -AC     Existing Precautions/Restrictions  fall  -AC      Row Name 03/11/21 1025          Cognition    Orientation Status (Cognition)  oriented x 4  -     Row Name 03/11/21 1025          Safety Issues, Functional Mobility    Safety Issues Affecting Function (Mobility)  safety precaution awareness;safety precautions follow-through/compliance  -     Impairments Affecting Function (Mobility)  balance;endurance/activity tolerance;strength  -       User Key  (r) = Recorded By, (t) = Taken By, (c) = Cosigned By    Initials Name Provider Type     Anupama Reagan, OT Occupational Therapist          Mobility/ADL's     Row Name 03/11/21 1025          Bed Mobility    Bed Mobility  supine-sit  -     Supine-Sit Kimberly (Bed Mobility)  modified independence  -     Assistive Device (Bed Mobility)  bed rails;head of bed elevated  -     Row Name 03/11/21 1025          Transfers    Transfers  sit-stand transfer  -     Sit-Stand Kimberly (Transfers)  supervision  -[facility] Name 03/11/21 1025          Sit-Stand Transfer    Assistive Device (Sit-Stand Transfers)  -- gt belt  -     Row Name 03/11/21 1025          Functional Mobility    Functional Mobility- Ind. Level  contact guard assist  -     Functional Mobility- Device  -- gt belt; pt declined walking with RW  -     Functional Mobility-Distance (Feet)  240  -     Functional Mobility- Comment  required 2 standing restbreaks to stop and stretch back  -     Row Name 03/11/21 1025          Activities of Daily Living    BADL Assessment/Intervention  grooming  -     Row Name 03/11/21 1025          Lower Body Dressing Assessment/Training    Kimberly Level (Lower Body Dressing)  don;socks;supervision  -     Position (Lower Body Dressing)  unsupported sitting  -     Row Name 03/11/21 1025          Grooming Assessment/Training    Kimberly Level (Grooming)  oral care regimen;wash face, hands;hair care, combing/brushing;standby assist  -     Position (Grooming)  sink side;unsupported standing  -        User Key  (r) = Recorded By, (t) = Taken By, (c) = Cosigned By    Initials Name Provider Type    Anupama Villalobos, OT Occupational Therapist        Obj/Interventions     Row Name 03/11/21 1025          Shoulder (Therapeutic Exercise)    Shoulder (Therapeutic Exercise)  AROM (active range of motion)  -     Shoulder AROM (Therapeutic Exercise)  bilateral;flexion;extension;aBduction;aDduction 15 reps  -     Row Name 03/11/21 1025          Elbow/Forearm (Therapeutic Exercise)    Elbow/Forearm (Therapeutic Exercise)  AROM (active range of motion)  -     Elbow/Forearm AROM (Therapeutic Exercise)  bilateral;flexion;extension 15reps  -     Row Name 03/11/21 1025          Therapeutic Exercise    Therapeutic Exercise  shoulder;elbow/forearm  -       User Key  (r) = Recorded By, (t) = Taken By, (c) = Cosigned By    Initials Name Provider Type    Anupama Villalobos, OT Occupational Therapist        Goals/Plan    No documentation.       Clinical Impression     Row Name 03/11/21 1025          Pain Scale: Numbers Pre/Post-Treatment    Pretreatment Pain Rating  0/10 - no pain  -     Posttreatment Pain Rating  0/10 - no pain  -     Row Name 03/11/21 1025          Plan of Care Review    Plan of Care Reviewed With  patient  -AC     Progress  improving  -     Outcome Summary  Pt supervision to don socks, SBA sinkside grooming.  Pt supervision STS,  CGA to ambulate in hallway without AD given 2 standing restbreaks to stretch back.  Pt with good tolerance to complete 15 reps BUE TE.  -     Row Name 03/11/21 1025          Vital Signs    Pre Systolic BP Rehab  97  -AC     Pre Treatment Diastolic BP  71  -AC     Post Systolic BP Rehab  109  -AC     Post Treatment Diastolic BP  77  -AC     Posttreatment Heart Rate (beats/min)  89  -AC     Post SpO2 (%)  96  -AC     O2 Delivery Post Treatment  room air  -AC     Pre Patient Position  Supine  -AC     Intra Patient Position  Standing  -AC     Post Patient Position  Sitting   -     Row Name 03/11/21 1025          Positioning and Restraints    Pre-Treatment Position  in bed  -AC     Post Treatment Position  chair  -AC     In Chair  reclined;call light within reach;encouraged to call for assist;waffle cushion;notified nsg;heels elevated  -       User Key  (r) = Recorded By, (t) = Taken By, (c) = Cosigned By    Initials Name Provider Type    Anupama Villalobos, OT Occupational Therapist        Outcome Measures     Row Name 03/11/21 1025          How much help from another is currently needed...    Putting on and taking off regular lower body clothing?  3  -AC     Bathing (including washing, rinsing, and drying)  3  -AC     Toileting (which includes using toilet bed pan or urinal)  3  -AC     Putting on and taking off regular upper body clothing  4  -AC     Taking care of personal grooming (such as brushing teeth)  4  -AC     Eating meals  4  -AC     AM-PAC 6 Clicks Score (OT)  21  -     Row Name 03/11/21 1025          Functional Assessment    Outcome Measure Options  AM-PAC 6 Clicks Daily Activity (OT)  -       User Key  (r) = Recorded By, (t) = Taken By, (c) = Cosigned By    Initials Name Provider Type    Anupama Villalobos, OT Occupational Therapist        Occupational Therapy Education                 Title: PT OT SLP Therapies (In Progress)     Topic: Occupational Therapy (In Progress)     Point: ADL training (Done)     Description:   Instruct learner(s) on proper safety adaptation and remediation techniques during self care or transfers.   Instruct in proper use of assistive devices.              Learning Progress Summary           Patient Acceptance, E, VU by HELENA at 3/11/2021 1025    Acceptance, E, VU by MA at 3/9/2021 0809                   Point: Home exercise program (Not Started)     Description:   Instruct learner(s) on appropriate technique for monitoring, assisting and/or progressing therapeutic exercises/activities.              Learner Progress:  Not documented in this  complains of pain/discomfort visit.          Point: Precautions (Done)     Description:   Instruct learner(s) on prescribed precautions during self-care and functional transfers.              Learning Progress Summary           Patient Acceptance, E, VU by MA at 3/9/2021 0809                   Point: Body mechanics (Done)     Description:   Instruct learner(s) on proper positioning and spine alignment during self-care, functional mobility activities and/or exercises.              Learning Progress Summary           Patient Acceptance, E, VU by MA at 3/9/2021 0809                               User Key     Initials Effective Dates Name Provider Type Discipline     06/23/15 -  Anupama Reagan, OT Occupational Therapist OT    MA 11/19/20 -  Ginny Fisher OT Occupational Therapist OT              OT Recommendation and Plan     Plan of Care Review  Plan of Care Reviewed With: patient  Progress: improving  Outcome Summary: Pt supervision to don socks, SBA sinkside grooming.  Pt supervision STS,  CGA to ambulate in hallway without AD given 2 standing restbreaks to stretch back.  Pt with good tolerance to complete 15 reps BUE TE.     Time Calculation:   Time Calculation- OT     Row Name 03/11/21 1025             Time Calculation- OT    OT Start Time  1025  -      OT Received On  03/11/21  -      OT Goal Re-Cert Due Date  03/19/21  -         Timed Charges    12271 - OT Therapeutic Exercise Minutes  5  -AC      15501 - OT Therapeutic Activity Minutes  10  -AC      29588 - OT Self Care/Mgmt Minutes  10  -AC        User Key  (r) = Recorded By, (t) = Taken By, (c) = Cosigned By    Initials Name Provider Type    AC Anupama Reagan, OT Occupational Therapist        Therapy Charges for Today     Code Description Service Date Service Provider Modifiers Qty    69145668441 HC OT THERAPEUTIC ACT EA 15 MIN 3/11/2021 Anupama Reagan, OT GO 1    16102960357 HC OT SELF CARE/MGMT/TRAIN EA 15 MIN 3/11/2021 Anupama Reagan, OT GO 1               Anupama BAUM. Tez  OT  3/11/2021

## (undated) DEVICE — ADHS SKIN PREMIERPRO EXOFIN TOPICAL HI/VISC .5ML

## (undated) DEVICE — NDL HYPO ECLPS SFTY 22G 1 1/2IN

## (undated) DEVICE — SPNG GZ WOVN 4X4IN 12PLY 10/BX STRL

## (undated) DEVICE — KT CATH DRN PLEURL PLEURX/SAFE/T SIL 15.5F 66CM 4BT/1000ML

## (undated) DEVICE — 28 FR STRAIGHT – SOFT PVC CATHETER: Brand: PVC THORACIC CATHETERS

## (undated) DEVICE — OASIS DRAIN, SINGLE, INLINE & ATS COMPATIBLE: Brand: OASIS

## (undated) DEVICE — GLV SURG TRIUMPH MICRO PF LTX 7 STRL

## (undated) DEVICE — SYR CONTRL LUERLOK 10CC

## (undated) DEVICE — SUT SILK 0 SH 30IN K834H

## (undated) DEVICE — SYR LL TP 10ML STRL

## (undated) DEVICE — APPL CHLORAPREP TINTED 26ML TEAL

## (undated) DEVICE — DRSNG SURESITE WNDW 4X4.5

## (undated) DEVICE — PK MINOR SPLT 10